# Patient Record
Sex: FEMALE | Race: WHITE | Employment: FULL TIME | URBAN - METROPOLITAN AREA
[De-identification: names, ages, dates, MRNs, and addresses within clinical notes are randomized per-mention and may not be internally consistent; named-entity substitution may affect disease eponyms.]

---

## 2019-08-19 ENCOUNTER — APPOINTMENT (OUTPATIENT)
Dept: GENERAL RADIOLOGY | Age: 24
End: 2019-08-19
Payer: MEDICAID

## 2019-08-19 ENCOUNTER — HOSPITAL ENCOUNTER (EMERGENCY)
Age: 24
Discharge: HOME | End: 2019-08-19
Payer: MEDICAID

## 2019-08-19 VITALS
HEIGHT: 67 IN | TEMPERATURE: 98.4 F | OXYGEN SATURATION: 100 % | SYSTOLIC BLOOD PRESSURE: 132 MMHG | BODY MASS INDEX: 36.1 KG/M2 | HEART RATE: 79 BPM | DIASTOLIC BLOOD PRESSURE: 100 MMHG | RESPIRATION RATE: 19 BRPM | WEIGHT: 230 LBS

## 2019-08-19 DIAGNOSIS — R07.89 CHEST WALL PAIN: Primary | ICD-10-CM

## 2019-08-19 PROCEDURE — 71045 X-RAY EXAM CHEST 1 VIEW: CPT

## 2019-08-19 PROCEDURE — 93005 ELECTROCARDIOGRAM TRACING: CPT | Performed by: PHYSICIAN ASSISTANT

## 2019-08-19 PROCEDURE — 99285 EMERGENCY DEPT VISIT HI MDM: CPT

## 2019-08-19 RX ORDER — NAPROXEN 500 MG/1
500 TABLET ORAL 2 TIMES DAILY PRN
Qty: 20 TABLET | Refills: 0 | Status: SHIPPED | OUTPATIENT
Start: 2019-08-19 | End: 2019-09-20 | Stop reason: SDUPTHER

## 2019-08-19 SDOH — HEALTH STABILITY: MENTAL HEALTH: HOW OFTEN DO YOU HAVE A DRINK CONTAINING ALCOHOL?: NEVER

## 2019-08-19 ASSESSMENT — PAIN DESCRIPTION - FREQUENCY: FREQUENCY: CONTINUOUS

## 2019-08-19 ASSESSMENT — PAIN SCALES - GENERAL: PAINLEVEL_OUTOF10: 7

## 2019-08-19 ASSESSMENT — PAIN DESCRIPTION - PAIN TYPE: TYPE: ACUTE PAIN

## 2019-08-19 ASSESSMENT — PAIN DESCRIPTION - DESCRIPTORS: DESCRIPTORS: BURNING;STABBING

## 2019-08-19 ASSESSMENT — PAIN DESCRIPTION - LOCATION: LOCATION: CHEST

## 2019-08-19 NOTE — ED NOTES
Bed: ED-35  Expected date:   Expected time:   Means of arrival:   Comments:  ems-cp     Michael Santiago RN  08/19/19 4112

## 2019-08-20 PROCEDURE — 93010 ELECTROCARDIOGRAM REPORT: CPT | Performed by: INTERNAL MEDICINE

## 2019-08-23 LAB
EKG ATRIAL RATE: 88 BPM
EKG DIAGNOSIS: NORMAL
EKG P AXIS: 42 DEGREES
EKG P-R INTERVAL: 150 MS
EKG Q-T INTERVAL: 364 MS
EKG QRS DURATION: 74 MS
EKG QTC CALCULATION (BAZETT): 440 MS
EKG R AXIS: 36 DEGREES
EKG T AXIS: 33 DEGREES
EKG VENTRICULAR RATE: 88 BPM

## 2019-09-20 ENCOUNTER — HOSPITAL ENCOUNTER (EMERGENCY)
Age: 24
Discharge: HOME | End: 2019-09-20
Attending: FAMILY MEDICINE
Payer: MEDICAID

## 2019-09-20 ENCOUNTER — APPOINTMENT (OUTPATIENT)
Dept: GENERAL RADIOLOGY | Age: 24
End: 2019-09-20
Payer: MEDICAID

## 2019-09-20 VITALS
RESPIRATION RATE: 20 BRPM | HEART RATE: 98 BPM | DIASTOLIC BLOOD PRESSURE: 88 MMHG | SYSTOLIC BLOOD PRESSURE: 144 MMHG | WEIGHT: 217 LBS | HEIGHT: 67 IN | TEMPERATURE: 98.4 F | OXYGEN SATURATION: 99 % | BODY MASS INDEX: 34.06 KG/M2

## 2019-09-20 DIAGNOSIS — N12 PYELONEPHRITIS: Primary | ICD-10-CM

## 2019-09-20 LAB
ALBUMIN SERPL-MCNC: 4.3 GM/DL (ref 3.4–5)
ALP BLD-CCNC: 82 IU/L (ref 40–129)
ALT SERPL-CCNC: 14 U/L (ref 10–40)
ANION GAP SERPL CALCULATED.3IONS-SCNC: 13 MMOL/L (ref 4–16)
AST SERPL-CCNC: 15 IU/L (ref 15–37)
BACTERIA: ABNORMAL /HPF
BASOPHILS ABSOLUTE: 0 K/CU MM
BASOPHILS RELATIVE PERCENT: 0.1 % (ref 0–1)
BILIRUB SERPL-MCNC: 0.3 MG/DL (ref 0–1)
BILIRUBIN URINE: NEGATIVE MG/DL
BLOOD, URINE: ABNORMAL
BUN BLDV-MCNC: 14 MG/DL (ref 6–23)
CALCIUM SERPL-MCNC: 9 MG/DL (ref 8.3–10.6)
CHLORIDE BLD-SCNC: 95 MMOL/L (ref 99–110)
CLARITY: ABNORMAL
CO2: 21 MMOL/L (ref 21–32)
COLOR: YELLOW
CREAT SERPL-MCNC: 0.9 MG/DL (ref 0.6–1.1)
DIFFERENTIAL TYPE: ABNORMAL
EOSINOPHILS ABSOLUTE: 0 K/CU MM
EOSINOPHILS RELATIVE PERCENT: 0.1 % (ref 0–3)
GFR AFRICAN AMERICAN: >60 ML/MIN/1.73M2
GFR NON-AFRICAN AMERICAN: >60 ML/MIN/1.73M2
GLUCOSE BLD-MCNC: 108 MG/DL (ref 70–99)
GLUCOSE, URINE: NEGATIVE MG/DL
HCG QUALITATIVE: NEGATIVE
HCT VFR BLD CALC: 35.9 % (ref 37–47)
HEMOGLOBIN: 11.1 GM/DL (ref 12.5–16)
IMMATURE NEUTROPHIL %: 0.4 % (ref 0–0.43)
KETONES, URINE: ABNORMAL MG/DL
LACTATE: 1.3 MMOL/L (ref 0.4–2)
LEUKOCYTE ESTERASE, URINE: ABNORMAL
LYMPHOCYTES ABSOLUTE: 0.9 K/CU MM
LYMPHOCYTES RELATIVE PERCENT: 6.3 % (ref 24–44)
MCH RBC QN AUTO: 27.1 PG (ref 27–31)
MCHC RBC AUTO-ENTMCNC: 30.9 % (ref 32–36)
MCV RBC AUTO: 87.6 FL (ref 78–100)
MONOCYTES ABSOLUTE: 1.1 K/CU MM
MONOCYTES RELATIVE PERCENT: 7.8 % (ref 0–4)
MUCUS: ABNORMAL HPF
NITRITE URINE, QUANTITATIVE: NEGATIVE
NUCLEATED RBC %: 0 %
PDW BLD-RTO: 14.3 % (ref 11.7–14.9)
PH, URINE: 6 (ref 5–8)
PLATELET # BLD: 376 K/CU MM (ref 140–440)
PMV BLD AUTO: 9.9 FL (ref 7.5–11.1)
POTASSIUM SERPL-SCNC: 3.7 MMOL/L (ref 3.5–5.1)
PROTEIN UA: 30 MG/DL
RAPID INFLUENZA  B AGN: NEGATIVE
RAPID INFLUENZA A AGN: NEGATIVE
RBC # BLD: 4.1 M/CU MM (ref 4.2–5.4)
RBC URINE: 7 /HPF (ref 0–6)
SEGMENTED NEUTROPHILS ABSOLUTE COUNT: 11.9 K/CU MM
SEGMENTED NEUTROPHILS RELATIVE PERCENT: 85.3 % (ref 36–66)
SODIUM BLD-SCNC: 129 MMOL/L (ref 135–145)
SPECIFIC GRAVITY UA: 1.02 (ref 1–1.03)
SQUAMOUS EPITHELIAL: 13 /HPF
TOTAL IMMATURE NEUTOROPHIL: 0.05 K/CU MM
TOTAL NUCLEATED RBC: 0 K/CU MM
TOTAL PROTEIN: 7.8 GM/DL (ref 6.4–8.2)
TRANSITIONAL EPITHELIAL: <1 /HPF
UROBILINOGEN, URINE: NORMAL MG/DL (ref 0.2–1)
WBC # BLD: 13.9 K/CU MM (ref 4–10.5)
WBC UA: 8 /HPF (ref 0–5)

## 2019-09-20 PROCEDURE — 87040 BLOOD CULTURE FOR BACTERIA: CPT

## 2019-09-20 PROCEDURE — 99284 EMERGENCY DEPT VISIT MOD MDM: CPT

## 2019-09-20 PROCEDURE — 80053 COMPREHEN METABOLIC PANEL: CPT

## 2019-09-20 PROCEDURE — 85025 COMPLETE CBC W/AUTO DIFF WBC: CPT

## 2019-09-20 PROCEDURE — 96365 THER/PROPH/DIAG IV INF INIT: CPT

## 2019-09-20 PROCEDURE — 87081 CULTURE SCREEN ONLY: CPT

## 2019-09-20 PROCEDURE — 96375 TX/PRO/DX INJ NEW DRUG ADDON: CPT

## 2019-09-20 PROCEDURE — 87430 STREP A AG IA: CPT

## 2019-09-20 PROCEDURE — 93005 ELECTROCARDIOGRAM TRACING: CPT | Performed by: FAMILY MEDICINE

## 2019-09-20 PROCEDURE — 6370000000 HC RX 637 (ALT 250 FOR IP): Performed by: FAMILY MEDICINE

## 2019-09-20 PROCEDURE — 2580000003 HC RX 258: Performed by: FAMILY MEDICINE

## 2019-09-20 PROCEDURE — 6360000002 HC RX W HCPCS: Performed by: FAMILY MEDICINE

## 2019-09-20 PROCEDURE — 87804 INFLUENZA ASSAY W/OPTIC: CPT

## 2019-09-20 PROCEDURE — 83605 ASSAY OF LACTIC ACID: CPT

## 2019-09-20 PROCEDURE — 96361 HYDRATE IV INFUSION ADD-ON: CPT

## 2019-09-20 PROCEDURE — 36415 COLL VENOUS BLD VENIPUNCTURE: CPT

## 2019-09-20 PROCEDURE — 84703 CHORIONIC GONADOTROPIN ASSAY: CPT

## 2019-09-20 PROCEDURE — 81001 URINALYSIS AUTO W/SCOPE: CPT

## 2019-09-20 PROCEDURE — 71046 X-RAY EXAM CHEST 2 VIEWS: CPT

## 2019-09-20 RX ORDER — NAPROXEN 500 MG/1
500 TABLET ORAL 2 TIMES DAILY PRN
Qty: 20 TABLET | Refills: 0 | Status: SHIPPED | OUTPATIENT
Start: 2019-09-20 | End: 2019-09-25

## 2019-09-20 RX ORDER — CEFUROXIME AXETIL 250 MG/1
250 TABLET ORAL 2 TIMES DAILY
Qty: 20 TABLET | Refills: 0 | Status: SHIPPED | OUTPATIENT
Start: 2019-09-20 | End: 2019-09-25

## 2019-09-20 RX ORDER — 0.9 % SODIUM CHLORIDE 0.9 %
30 INTRAVENOUS SOLUTION INTRAVENOUS ONCE
Status: COMPLETED | OUTPATIENT
Start: 2019-09-20 | End: 2019-09-20

## 2019-09-20 RX ORDER — KETOROLAC TROMETHAMINE 30 MG/ML
30 INJECTION, SOLUTION INTRAMUSCULAR; INTRAVENOUS ONCE
Status: COMPLETED | OUTPATIENT
Start: 2019-09-20 | End: 2019-09-20

## 2019-09-20 RX ORDER — ACETAMINOPHEN 500 MG
1000 TABLET ORAL ONCE
Status: COMPLETED | OUTPATIENT
Start: 2019-09-20 | End: 2019-09-20

## 2019-09-20 RX ADMIN — KETOROLAC TROMETHAMINE 30 MG: 30 INJECTION, SOLUTION INTRAMUSCULAR at 20:47

## 2019-09-20 RX ADMIN — ACETAMINOPHEN 1000 MG: 500 TABLET ORAL at 20:47

## 2019-09-20 RX ADMIN — CEFTRIAXONE SODIUM 2 G: 2 INJECTION, POWDER, FOR SOLUTION INTRAMUSCULAR; INTRAVENOUS at 22:35

## 2019-09-20 RX ADMIN — SODIUM CHLORIDE 2952 ML: 9 INJECTION, SOLUTION INTRAVENOUS at 20:19

## 2019-09-20 ASSESSMENT — PAIN DESCRIPTION - PAIN TYPE: TYPE: ACUTE PAIN

## 2019-09-20 ASSESSMENT — PAIN DESCRIPTION - LOCATION: LOCATION: HEAD

## 2019-09-20 ASSESSMENT — PAIN SCALES - GENERAL
PAINLEVEL_OUTOF10: 10
PAINLEVEL_OUTOF10: 10

## 2019-09-21 NOTE — ED PROVIDER NOTES
Transportation needs:     Medical: Not on file     Non-medical: Not on file   Tobacco Use    Smoking status: Never Smoker    Smokeless tobacco: Never Used   Substance and Sexual Activity    Alcohol use: Never     Frequency: Never    Drug use: Never    Sexual activity: Yes     Partners: Male   Lifestyle    Physical activity:     Days per week: Not on file     Minutes per session: Not on file    Stress: Not on file   Relationships    Social connections:     Talks on phone: Not on file     Gets together: Not on file     Attends Oriental orthodox service: Not on file     Active member of club or organization: Not on file     Attends meetings of clubs or organizations: Not on file     Relationship status: Not on file    Intimate partner violence:     Fear of current or ex partner: Not on file     Emotionally abused: Not on file     Physically abused: Not on file     Forced sexual activity: Not on file   Other Topics Concern    Not on file   Social History Narrative    Not on file     No current facility-administered medications for this encounter. Current Outpatient Medications   Medication Sig Dispense Refill    cefUROXime (CEFTIN) 250 MG tablet Take 1 tablet by mouth 2 times daily for 10 days 20 tablet 0    naproxen (NAPROSYN) 500 MG tablet Take 1 tablet by mouth 2 times daily as needed for Pain 20 tablet 0     No Known Allergies    Nursing Notes Reviewed    Physical Exam:  ED Triage Vitals   Enc Vitals Group      BP 09/20/19 1931 (!) 146/95      Pulse 09/20/19 1931 125      Resp 09/20/19 1931 20      Temp 09/20/19 1931 100.8 °F (38.2 °C)      Temp Source 09/20/19 1931 Oral      SpO2 09/20/19 1931 99 %      Weight 09/20/19 1929 217 lb (98.4 kg)      Height 09/20/19 1929 5' 7\" (1.702 m)      Head Circumference --       Peak Flow --       Pain Score --       Pain Loc --       Pain Edu? --       Excl. in [address]? --        My pulse ox interpretation is - normal    General appearance:  No acute distress.   Tachycardic Urine LARGE (A) NEGATIVE    RBC, UA 7 (H) 0 - 6 /HPF    WBC, UA 8 (H) 0 - 5 /HPF    Bacteria, UA RARE (A) NEGATIVE /HPF    Squam Epithel, UA 13 /HPF    Trans Epithel, UA <1 /HPF    Mucus, UA OCCASIONAL (A) NEGATIVE HPF   HCG Serum, Qualitative   Result Value Ref Range    hCG Qual NEGATIVE    EKG 12 Lead   Result Value Ref Range    Ventricular Rate 124 BPM    Atrial Rate 124 BPM    P-R Interval 134 ms    QRS Duration 72 ms    Q-T Interval 302 ms    QTc Calculation (Bazett) 433 ms    P Axis 41 degrees    R Axis 35 degrees    T Axis 27 degrees    Diagnosis       Sinus tachycardia  Possible Left atrial enlargement  Borderline ECG  When compared with ECG of 19-AUG-2019 18:38,  No significant change was found  Confirmed by Fred Cornejo MD, [facility] ([id number]) on 9/23/2019 4:35:21 AM        Radiographs (if obtained):  [] The following radiograph was interpreted by myself in the absence of a radiologist:   [] Radiologist's Report Reviewed:  XR CHEST STANDARD (2 VW)   Final Result   No acute process. EKG (if obtained): (All EKG's are interpreted by myself in the absence of a cardiologist) EKG demonstrates sinus tachycardia but otherwise normal.  Rate 124. Normal axis. . QRS 72. QTc 433. Normal R wave progression. No flipped T wave. No Q waves. No Brugada. No delta wave. Abnormal EKG with sinus tachycardia but no ST elevation MI or arrhythmia    Chart review shows recent radiographs:  Xr Chest Standard (2 Vw)    Result Date: 9/20/2019  EXAMINATION: TWO XRAY VIEWS OF THE CHEST 9/20/2019 7:44 pm COMPARISON: Chest x-ray August 19, 2019 HISTORY: ORDERING SYSTEM PROVIDED HISTORY: ? PNA TECHNOLOGIST PROVIDED HISTORY: Reason for exam:->? PNA Reason for Exam: ? PNA Acuity: Acute Type of Exam: Initial Additional signs and symptoms: na Relevant Medical/Surgical History: na FINDINGS: The lungs are without acute focal process. There is no effusion or pneumothorax.  The cardiomediastinal silhouette is without

## 2019-09-23 LAB
CULTURE: ABNORMAL
Lab: ABNORMAL
SPECIMEN: ABNORMAL
STREP A DIRECT SCREEN: NEGATIVE

## 2019-09-23 PROCEDURE — 93010 ELECTROCARDIOGRAM REPORT: CPT | Performed by: INTERNAL MEDICINE

## 2019-09-24 LAB
EKG ATRIAL RATE: 124 BPM
EKG DIAGNOSIS: NORMAL
EKG P AXIS: 41 DEGREES
EKG P-R INTERVAL: 134 MS
EKG Q-T INTERVAL: 302 MS
EKG QRS DURATION: 72 MS
EKG QTC CALCULATION (BAZETT): 433 MS
EKG R AXIS: 35 DEGREES
EKG T AXIS: 27 DEGREES
EKG VENTRICULAR RATE: 124 BPM

## 2019-09-25 ENCOUNTER — OFFICE VISIT (OUTPATIENT)
Dept: FAMILY MEDICINE CLINIC | Age: 24
End: 2019-09-25
Payer: MEDICAID

## 2019-09-25 VITALS
DIASTOLIC BLOOD PRESSURE: 72 MMHG | HEART RATE: 68 BPM | OXYGEN SATURATION: 98 % | BODY MASS INDEX: 38.32 KG/M2 | WEIGHT: 230 LBS | TEMPERATURE: 97 F | HEIGHT: 65 IN | SYSTOLIC BLOOD PRESSURE: 132 MMHG

## 2019-09-25 DIAGNOSIS — E87.1 HYPONATREMIA: Primary | ICD-10-CM

## 2019-09-25 DIAGNOSIS — N10 ACUTE PYELONEPHRITIS: ICD-10-CM

## 2019-09-25 DIAGNOSIS — R10.84 GENERALIZED ABDOMINAL PAIN: ICD-10-CM

## 2019-09-25 DIAGNOSIS — Z76.89 ESTABLISHING CARE WITH NEW DOCTOR, ENCOUNTER FOR: ICD-10-CM

## 2019-09-25 DIAGNOSIS — E03.9 ACQUIRED HYPOTHYROIDISM: ICD-10-CM

## 2019-09-25 LAB
CULTURE: NORMAL
CULTURE: NORMAL
Lab: NORMAL
Lab: NORMAL
SPECIMEN: NORMAL
SPECIMEN: NORMAL

## 2019-09-25 PROCEDURE — G8427 DOCREV CUR MEDS BY ELIG CLIN: HCPCS | Performed by: FAMILY MEDICINE

## 2019-09-25 PROCEDURE — G8417 CALC BMI ABV UP PARAM F/U: HCPCS | Performed by: FAMILY MEDICINE

## 2019-09-25 PROCEDURE — 1036F TOBACCO NON-USER: CPT | Performed by: FAMILY MEDICINE

## 2019-09-25 PROCEDURE — 99203 OFFICE O/P NEW LOW 30 MIN: CPT | Performed by: FAMILY MEDICINE

## 2019-09-25 RX ORDER — OMEPRAZOLE 20 MG/1
20 CAPSULE, DELAYED RELEASE ORAL
Qty: 30 CAPSULE | Refills: 1 | Status: SHIPPED | OUTPATIENT
Start: 2019-09-25 | End: 2019-11-26 | Stop reason: SDUPTHER

## 2019-09-25 RX ORDER — LEVOTHYROXINE SODIUM 0.03 MG/1
25 TABLET ORAL DAILY
Qty: 30 TABLET | Refills: 1 | Status: SHIPPED | OUTPATIENT
Start: 2019-09-25 | End: 2019-11-26 | Stop reason: SDUPTHER

## 2019-09-25 ASSESSMENT — PATIENT HEALTH QUESTIONNAIRE - PHQ9
SUM OF ALL RESPONSES TO PHQ QUESTIONS 1-9: 0
SUM OF ALL RESPONSES TO PHQ9 QUESTIONS 1 & 2: 0
2. FEELING DOWN, DEPRESSED OR HOPELESS: 0
SUM OF ALL RESPONSES TO PHQ QUESTIONS 1-9: 0
1. LITTLE INTEREST OR PLEASURE IN DOING THINGS: 0

## 2019-09-25 ASSESSMENT — ENCOUNTER SYMPTOMS
VOMITING: 0
DIARRHEA: 0
COUGH: 0
NAUSEA: 0
ABDOMINAL PAIN: 1
SHORTNESS OF BREATH: 0
BLOOD IN STOOL: 0

## 2019-10-01 ENCOUNTER — NURSE ONLY (OUTPATIENT)
Dept: FAMILY MEDICINE CLINIC | Age: 24
End: 2019-10-01
Payer: MEDICAID

## 2019-10-01 DIAGNOSIS — R10.84 GENERALIZED ABDOMINAL PAIN: ICD-10-CM

## 2019-10-01 DIAGNOSIS — E87.1 HYPONATREMIA: ICD-10-CM

## 2019-10-01 DIAGNOSIS — E03.9 ACQUIRED HYPOTHYROIDISM: ICD-10-CM

## 2019-10-01 DIAGNOSIS — N10 ACUTE PYELONEPHRITIS: ICD-10-CM

## 2019-10-01 LAB
ANION GAP SERPL CALCULATED.3IONS-SCNC: 15 MMOL/L (ref 3–16)
BASOPHILS ABSOLUTE: 0 K/UL (ref 0–0.2)
BASOPHILS RELATIVE PERCENT: 0.7 %
BUN BLDV-MCNC: 10 MG/DL (ref 7–20)
CALCIUM SERPL-MCNC: 9.6 MG/DL (ref 8.3–10.6)
CHLORIDE BLD-SCNC: 104 MMOL/L (ref 99–110)
CO2: 21 MMOL/L (ref 21–32)
CREAT SERPL-MCNC: 0.7 MG/DL (ref 0.6–1.1)
EOSINOPHILS ABSOLUTE: 0.1 K/UL (ref 0–0.6)
EOSINOPHILS RELATIVE PERCENT: 2.8 %
GFR AFRICAN AMERICAN: >60
GFR NON-AFRICAN AMERICAN: >60
GLUCOSE BLD-MCNC: 85 MG/DL (ref 70–99)
HCT VFR BLD CALC: 32.4 % (ref 36–48)
HEMOGLOBIN: 10.7 G/DL (ref 12–16)
LIPASE: 28 U/L (ref 13–60)
LYMPHOCYTES ABSOLUTE: 1.3 K/UL (ref 1–5.1)
LYMPHOCYTES RELATIVE PERCENT: 29.9 %
MCH RBC QN AUTO: 27.5 PG (ref 26–34)
MCHC RBC AUTO-ENTMCNC: 33 G/DL (ref 31–36)
MCV RBC AUTO: 83.5 FL (ref 80–100)
MONOCYTES ABSOLUTE: 0.4 K/UL (ref 0–1.3)
MONOCYTES RELATIVE PERCENT: 8.3 %
NEUTROPHILS ABSOLUTE: 2.6 K/UL (ref 1.7–7.7)
NEUTROPHILS RELATIVE PERCENT: 58.3 %
PDW BLD-RTO: 15.7 % (ref 12.4–15.4)
PLATELET # BLD: 407 K/UL (ref 135–450)
PMV BLD AUTO: 7.9 FL (ref 5–10.5)
POTASSIUM SERPL-SCNC: 4.8 MMOL/L (ref 3.5–5.1)
RBC # BLD: 3.88 M/UL (ref 4–5.2)
SODIUM BLD-SCNC: 140 MMOL/L (ref 136–145)
T4 FREE: 1.2 NG/DL (ref 0.9–1.8)
TSH SERPL DL<=0.05 MIU/L-ACNC: 3.97 UIU/ML (ref 0.27–4.2)
WBC # BLD: 4.4 K/UL (ref 4–11)

## 2019-10-01 PROCEDURE — 36415 COLL VENOUS BLD VENIPUNCTURE: CPT | Performed by: FAMILY MEDICINE

## 2019-11-12 ENCOUNTER — NURSE ONLY (OUTPATIENT)
Dept: FAMILY MEDICINE CLINIC | Age: 24
End: 2019-11-12
Payer: MEDICAID

## 2019-11-12 DIAGNOSIS — R79.89 ABNORMAL CBC: Primary | ICD-10-CM

## 2019-11-12 LAB
BASOPHILS ABSOLUTE: 0 K/UL (ref 0–0.2)
BASOPHILS RELATIVE PERCENT: 0.4 %
EOSINOPHILS ABSOLUTE: 0.1 K/UL (ref 0–0.6)
EOSINOPHILS RELATIVE PERCENT: 1.9 %
HCT VFR BLD CALC: 35.6 % (ref 36–48)
HEMOGLOBIN: 11.5 G/DL (ref 12–16)
LYMPHOCYTES ABSOLUTE: 1.2 K/UL (ref 1–5.1)
LYMPHOCYTES RELATIVE PERCENT: 20.9 %
MCH RBC QN AUTO: 27.9 PG (ref 26–34)
MCHC RBC AUTO-ENTMCNC: 32.2 G/DL (ref 31–36)
MCV RBC AUTO: 86.7 FL (ref 80–100)
MONOCYTES ABSOLUTE: 0.4 K/UL (ref 0–1.3)
MONOCYTES RELATIVE PERCENT: 6.4 %
NEUTROPHILS ABSOLUTE: 4 K/UL (ref 1.7–7.7)
NEUTROPHILS RELATIVE PERCENT: 70.4 %
PDW BLD-RTO: 16.9 % (ref 12.4–15.4)
PLATELET # BLD: 350 K/UL (ref 135–450)
PMV BLD AUTO: 8 FL (ref 5–10.5)
RBC # BLD: 4.11 M/UL (ref 4–5.2)
WBC # BLD: 5.7 K/UL (ref 4–11)

## 2019-11-12 PROCEDURE — 36000 PLACE NEEDLE IN VEIN: CPT | Performed by: FAMILY MEDICINE

## 2019-11-26 ENCOUNTER — OFFICE VISIT (OUTPATIENT)
Dept: FAMILY MEDICINE CLINIC | Age: 24
End: 2019-11-26
Payer: MEDICAID

## 2019-11-26 VITALS
SYSTOLIC BLOOD PRESSURE: 118 MMHG | WEIGHT: 229.8 LBS | TEMPERATURE: 96.5 F | DIASTOLIC BLOOD PRESSURE: 68 MMHG | BODY MASS INDEX: 38.84 KG/M2 | HEART RATE: 90 BPM | OXYGEN SATURATION: 96 %

## 2019-11-26 DIAGNOSIS — R10.84 GENERALIZED ABDOMINAL PAIN: Primary | ICD-10-CM

## 2019-11-26 DIAGNOSIS — K12.1 ORAL ULCER: ICD-10-CM

## 2019-11-26 DIAGNOSIS — E03.9 ACQUIRED HYPOTHYROIDISM: ICD-10-CM

## 2019-11-26 DIAGNOSIS — D64.9 ANEMIA, UNSPECIFIED TYPE: ICD-10-CM

## 2019-11-26 LAB
C-REACTIVE PROTEIN: 1.9 MG/L (ref 0–5.1)
SEDIMENTATION RATE, ERYTHROCYTE: 17 MM/HR (ref 0–20)

## 2019-11-26 PROCEDURE — 99214 OFFICE O/P EST MOD 30 MIN: CPT | Performed by: FAMILY MEDICINE

## 2019-11-26 PROCEDURE — 36415 COLL VENOUS BLD VENIPUNCTURE: CPT | Performed by: FAMILY MEDICINE

## 2019-11-26 PROCEDURE — 1036F TOBACCO NON-USER: CPT | Performed by: FAMILY MEDICINE

## 2019-11-26 PROCEDURE — G8417 CALC BMI ABV UP PARAM F/U: HCPCS | Performed by: FAMILY MEDICINE

## 2019-11-26 PROCEDURE — G8484 FLU IMMUNIZE NO ADMIN: HCPCS | Performed by: FAMILY MEDICINE

## 2019-11-26 PROCEDURE — G8427 DOCREV CUR MEDS BY ELIG CLIN: HCPCS | Performed by: FAMILY MEDICINE

## 2019-11-26 RX ORDER — LIDOCAINE HYDROCHLORIDE 20 MG/ML
5 SOLUTION OROPHARYNGEAL PRN
Qty: 1 BOTTLE | Refills: 0 | Status: SHIPPED | OUTPATIENT
Start: 2019-11-26 | End: 2020-03-05

## 2019-11-26 RX ORDER — OMEPRAZOLE 20 MG/1
20-40 CAPSULE, DELAYED RELEASE ORAL
Qty: 60 CAPSULE | Refills: 1 | Status: SHIPPED | OUTPATIENT
Start: 2019-11-26 | End: 2020-03-05 | Stop reason: SDUPTHER

## 2019-11-26 RX ORDER — LEVOTHYROXINE SODIUM 0.03 MG/1
25 TABLET ORAL DAILY
Qty: 90 TABLET | Refills: 3 | Status: SHIPPED | OUTPATIENT
Start: 2019-11-26 | End: 2020-07-29

## 2019-11-26 ASSESSMENT — ENCOUNTER SYMPTOMS: SHORTNESS OF BREATH: 0

## 2019-12-10 ENCOUNTER — TELEPHONE (OUTPATIENT)
Dept: FAMILY MEDICINE CLINIC | Age: 24
End: 2019-12-10

## 2019-12-10 DIAGNOSIS — D64.9 ANEMIA, UNSPECIFIED TYPE: ICD-10-CM

## 2019-12-10 LAB
CONTROL: NORMAL
HEMOCCULT STL QL: NEGATIVE

## 2020-03-04 ENCOUNTER — TELEPHONE (OUTPATIENT)
Dept: FAMILY MEDICINE CLINIC | Age: 25
End: 2020-03-04

## 2020-03-05 ENCOUNTER — OFFICE VISIT (OUTPATIENT)
Dept: FAMILY MEDICINE CLINIC | Age: 25
End: 2020-03-05
Payer: MEDICAID

## 2020-03-05 VITALS
HEART RATE: 105 BPM | SYSTOLIC BLOOD PRESSURE: 124 MMHG | DIASTOLIC BLOOD PRESSURE: 70 MMHG | WEIGHT: 227.4 LBS | OXYGEN SATURATION: 98 % | BODY MASS INDEX: 38.43 KG/M2 | TEMPERATURE: 97.7 F

## 2020-03-05 LAB
INFLUENZA VIRUS A RNA: NEGATIVE
INFLUENZA VIRUS B RNA: NEGATIVE

## 2020-03-05 PROCEDURE — 87502 INFLUENZA DNA AMP PROBE: CPT | Performed by: FAMILY MEDICINE

## 2020-03-05 PROCEDURE — 99213 OFFICE O/P EST LOW 20 MIN: CPT | Performed by: FAMILY MEDICINE

## 2020-03-05 PROCEDURE — G8484 FLU IMMUNIZE NO ADMIN: HCPCS | Performed by: FAMILY MEDICINE

## 2020-03-05 PROCEDURE — G8417 CALC BMI ABV UP PARAM F/U: HCPCS | Performed by: FAMILY MEDICINE

## 2020-03-05 PROCEDURE — G8427 DOCREV CUR MEDS BY ELIG CLIN: HCPCS | Performed by: FAMILY MEDICINE

## 2020-03-05 PROCEDURE — 1036F TOBACCO NON-USER: CPT | Performed by: FAMILY MEDICINE

## 2020-03-05 RX ORDER — BROMPHENIRAMINE MALEATE, PSEUDOEPHEDRINE HYDROCHLORIDE, AND DEXTROMETHORPHAN HYDROBROMIDE 2; 30; 10 MG/5ML; MG/5ML; MG/5ML
5 SYRUP ORAL 4 TIMES DAILY PRN
Qty: 473 ML | Refills: 0 | Status: SHIPPED | OUTPATIENT
Start: 2020-03-05 | End: 2020-07-29

## 2020-03-05 RX ORDER — OMEPRAZOLE 20 MG/1
20-40 CAPSULE, DELAYED RELEASE ORAL
Qty: 60 CAPSULE | Refills: 1 | Status: SHIPPED | OUTPATIENT
Start: 2020-03-05 | End: 2020-07-29

## 2020-03-05 ASSESSMENT — ENCOUNTER SYMPTOMS
DIARRHEA: 1
VOMITING: 1
SHORTNESS OF BREATH: 1
NAUSEA: 1
WHEEZING: 1
COUGH: 1
RHINORRHEA: 1
SORE THROAT: 1

## 2020-03-05 NOTE — PROGRESS NOTES
Subjective:      Patient ID: Hari Mulligan is a 30 y.o. female. Cinda Barrios is here with uri symptoms. Cough   This is a new problem. The current episode started in the past 7 days. The cough is productive of sputum. Associated symptoms include ear pain, headaches, postnasal drip, rhinorrhea, a sore throat, shortness of breath and wheezing. Pertinent negatives include no chills, fever or nasal congestion. Review of Systems   Constitutional: Negative for chills and fever. HENT: Positive for ear pain, postnasal drip, rhinorrhea and sore throat. Respiratory: Positive for cough, shortness of breath and wheezing. Gastrointestinal: Positive for diarrhea, nausea and vomiting. Neurological: Positive for dizziness and headaches. Past Medical History:   Diagnosis Date    Hypothyroidism 2017     No past surgical history on file.   Social History     Socioeconomic History    Marital status: Single     Spouse name: Not on file    Number of children: Not on file    Years of education: Not on file    Highest education level: Not on file   Occupational History    Not on file   Social Needs    Financial resource strain: Not on file    Food insecurity:     Worry: Not on file     Inability: Not on file    Transportation needs:     Medical: Not on file     Non-medical: Not on file   Tobacco Use    Smoking status: Never Smoker    Smokeless tobacco: Never Used   Substance and Sexual Activity    Alcohol use: Never     Frequency: Never    Drug use: Never    Sexual activity: Yes     Partners: Male   Lifestyle    Physical activity:     Days per week: Not on file     Minutes per session: Not on file    Stress: Not on file   Relationships    Social connections:     Talks on phone: Not on file     Gets together: Not on file     Attends Anabaptist service: Not on file     Active member of club or organization: Not on file     Attends meetings of clubs or organizations: Not on file     Relationship status: Not Outpatient Medications:     omeprazole (PRILOSEC) 20 MG delayed release capsule, Take 1-2 capsules by mouth every morning (before breakfast), Disp: 60 capsule, Rfl: 1    brompheniramine-pseudoephedrine-DM 2-30-10 MG/5ML syrup, Take 5 mLs by mouth 4 times daily as needed for Cough, Disp: 473 mL, Rfl: 0    levothyroxine (SYNTHROID) 25 MCG tablet, Take 1 tablet by mouth daily, Disp: 90 tablet, Rfl: 3         Srinivas Chris MD

## 2020-07-22 ENCOUNTER — TELEPHONE (OUTPATIENT)
Dept: FAMILY MEDICINE CLINIC | Age: 25
End: 2020-07-22

## 2020-07-22 NOTE — TELEPHONE ENCOUNTER
Patient left message requesting to be evaluated today. She \"did something\" to her wrist, and it now feels tingly and numb. States she is unable to feel anything in her hands.  Should patient be scheduled virtually or in office please advise

## 2020-07-29 ENCOUNTER — VIRTUAL VISIT (OUTPATIENT)
Dept: FAMILY MEDICINE CLINIC | Age: 25
End: 2020-07-29
Payer: MEDICAID

## 2020-07-29 PROCEDURE — 99447 NTRPROF PH1/NTRNET/EHR 11-20: CPT | Performed by: FAMILY MEDICINE

## 2020-07-29 RX ORDER — PREDNISONE 20 MG/1
40 TABLET ORAL DAILY
Qty: 10 TABLET | Refills: 0 | Status: SHIPPED | OUTPATIENT
Start: 2020-07-29 | End: 2020-08-03

## 2020-07-29 ASSESSMENT — PATIENT HEALTH QUESTIONNAIRE - PHQ9
1. LITTLE INTEREST OR PLEASURE IN DOING THINGS: 0
2. FEELING DOWN, DEPRESSED OR HOPELESS: 0
SUM OF ALL RESPONSES TO PHQ QUESTIONS 1-9: 0
SUM OF ALL RESPONSES TO PHQ QUESTIONS 1-9: 0
SUM OF ALL RESPONSES TO PHQ9 QUESTIONS 1 & 2: 0

## 2020-07-29 NOTE — PROGRESS NOTES
on phone: Not on file     Gets together: Not on file     Attends Taoist service: Not on file     Active member of club or organization: Not on file     Attends meetings of clubs or organizations: Not on file     Relationship status: Not on file    Intimate partner violence     Fear of current or ex partner: Not on file     Emotionally abused: Not on file     Physically abused: Not on file     Forced sexual activity: Not on file   Other Topics Concern    Not on file   Social History Narrative    Not on file     Family History   Problem Relation Age of Onset    High Blood Pressure Mother     Depression Mother     Diabetes Mother     Heart Attack Father     Other Father         DVT with PE    O:  Alert and Oriented. Normal respiratory effort. A/P:  1. Hand numbness  Try a steroid burst. If no improvement will check labs, consider neck exercise. Bein britt her thyroid med, may be making a difference. Ok to use OTC meds as needed as well. 2. Chronic Abd Pain  Etiology unclear. Consider trial of different PPI. 3. Hypothyroid  Off meds and uncontrolled. May play a roll in hand numbness. Will get labs in the near future. Follow up pending response to steroids. Current Outpatient Medications:     predniSONE (DELTASONE) 20 MG tablet, Take 2 tablets by mouth daily for 5 days, Disp: 10 tablet, Rfl: 0       I affirm this is a Patient Initiated Episode with a Patient who has not had a related appointment within my department in the past 7 days or scheduled within the next 24 hours.     Patient identification was verified at the start of the visit: Yes    Total Time: minutes: 11-20 minutes    Note: not billable if this call serves to triage the patient into an appointment for the relevant concern      Agustina Monsalve

## 2020-08-06 ENCOUNTER — TELEPHONE (OUTPATIENT)
Dept: FAMILY MEDICINE CLINIC | Age: 25
End: 2020-08-06

## 2020-08-10 NOTE — TELEPHONE ENCOUNTER
Spoke with Scheduling at [facility] in [facility] state they do accept patient insurance once she receives the referral she will try to get her in either Tuesday or Friday this week.

## 2020-08-12 NOTE — TELEPHONE ENCOUNTER
Patient notified she is requesting an appt in office to be evaluated today she has been feeling dizzy and having blurred vision since Sunday.

## 2020-08-13 ENCOUNTER — HOSPITAL ENCOUNTER (OUTPATIENT)
Age: 25
Setting detail: SPECIMEN
Discharge: HOME | End: 2020-08-13
Payer: COMMERCIAL

## 2020-08-13 ENCOUNTER — OFFICE VISIT (OUTPATIENT)
Dept: PRIMARY CARE CLINIC | Age: 25
End: 2020-08-13
Payer: MEDICAID

## 2020-08-13 VITALS — TEMPERATURE: 97.1 F

## 2020-08-13 PROCEDURE — 1036F TOBACCO NON-USER: CPT | Performed by: INTERNAL MEDICINE

## 2020-08-13 PROCEDURE — G8428 CUR MEDS NOT DOCUMENT: HCPCS | Performed by: INTERNAL MEDICINE

## 2020-08-13 PROCEDURE — U0002 COVID-19 LAB TEST NON-CDC: HCPCS

## 2020-08-13 PROCEDURE — G8417 CALC BMI ABV UP PARAM F/U: HCPCS | Performed by: INTERNAL MEDICINE

## 2020-08-13 PROCEDURE — 99213 OFFICE O/P EST LOW 20 MIN: CPT | Performed by: INTERNAL MEDICINE

## 2020-08-13 RX ORDER — AZITHROMYCIN 250 MG/1
250 TABLET, FILM COATED ORAL SEE ADMIN INSTRUCTIONS
Qty: 6 TABLET | Refills: 0 | Status: SHIPPED | OUTPATIENT
Start: 2020-08-13 | End: 2020-08-18

## 2020-08-13 RX ORDER — BENZONATATE 100 MG/1
100 CAPSULE ORAL 3 TIMES DAILY PRN
Qty: 30 CAPSULE | Refills: 0 | Status: SHIPPED | OUTPATIENT
Start: 2020-08-13 | End: 2020-08-20

## 2020-08-13 NOTE — PATIENT INSTRUCTIONS
Your COVID 19 test can take 3-5 days for the results come back. We ask that you make a Mychart page and view your test results this way. You will need to Self quarantine until you know your results. Increase fluids rest  Saline nasal spray as directed  Warm salt gargles for throat discomfort  Monitor temperature twice a day  Tylenol for fevers and/or discomfort. If symptoms are worse -Go to the ER. Follow up with your primary doctor    To Whom it May Concern:    Kathryn Read has been tested for COVID on 08/13/20. They may NOT return to work until their lab test results back and they been fever free for 3 days. If test is positive they must stay home for 2 weeks or until they test negative or as directed by the [facility] Department.

## 2020-08-13 NOTE — TELEPHONE ENCOUNTER
Last Friday she took blood last Friday it was 137/107 and no fever she checks daily temp. Last night it was 97.3 but earlier yesterday it 99.9  She is having a sore throat, stuffy nose and weakness. [facility] She is on her way now.

## 2020-08-13 NOTE — PROGRESS NOTES
8/13/20  Kathryn Cost  1995    FLU/COVID-19 CLINIC EVALUATION    HPI SYMPTOMS:    Employer: [employer], [place]    [] Fevers  [] Chills  [x] Cough  [] Coughing up blood  [] Chest Congestion  [x] Nasal Congestion  [x] Feeling short of breath  [x] Sometimes  [] Frequently  [] All the time  [x] Chest pain--AT TIMES  [x] Headaches  [x]Tolerable  [] Severe  [x] Sore throat  [x] Muscle aches  [] Nausea  [] Vomiting  []Unable to keep fluids down  [x] Diarrhea  []Severe    [x] OTHER SYMPTOMS: FATIGUE, TINGLING/NUMBNESS BILATERAL HANDS X 5 WEEKS      Symptom Duration:   [] 1  [] 2   [] 3   [] 4    [x] 5   [] 6   [] 7   [] 8   [] 9   [] 10   [] 11   [] 12   [] 13   [] 14   [] Longer than 14 days    Symptom course:   [] Worsening     [x] Stable     [] Improving    RISK FACTORS:    [] Pregnant or possibly pregnant  [] Age over 61  [] Diabetes  [] Heart disease  [] Asthma  [] COPD/Other chronic lung diseases  [] Active Cancer  [] On Chemotherapy  [] Taking oral steroids  [] History Lymphoma/Leukemia  [] Close contact with a lab confirmed COVID-19 patient within 14 days of symptom onset  [] History of travel from affected geographical areas within 14 days of symptom onset       VITALS:  There were no vitals filed for this visit. TESTS:    POCT FLU:  [] Positive     []Negative    ASSESSMENT:    [] Flu  [] Possible COVID-19  [] Strep    PLAN:    [] Discharge home with written instructions for:  [] Flu management  [] Possible COVID-19 infection with self-quarantine and management of symptoms  [] Follow-up with primary care physician or emergency department if worsens  [] Evaluation per physician/nurse practitioner in clinic  [] Sent to ER       An  electronic signature was used to authenticate this note.      --Sigrid Ford MA on 8/13/2020 at 8:53 AM

## 2020-08-13 NOTE — TELEPHONE ENCOUNTER
Noted. Was seen at [facility]. Probable bronchitis was the diagnosis.

## 2020-08-14 LAB
SARS-COV-2: DETECTED
SOURCE: ABNORMAL

## 2020-08-19 ENCOUNTER — CARE COORDINATION (OUTPATIENT)
Dept: CARE COORDINATION | Age: 25
End: 2020-08-19

## 2020-11-03 ENCOUNTER — HOSPITAL ENCOUNTER (EMERGENCY)
Age: 25
Discharge: HOME | End: 2020-11-03
Attending: EMERGENCY MEDICINE
Payer: COMMERCIAL

## 2020-11-03 VITALS
SYSTOLIC BLOOD PRESSURE: 169 MMHG | RESPIRATION RATE: 14 BRPM | OXYGEN SATURATION: 99 % | BODY MASS INDEX: 37.82 KG/M2 | HEART RATE: 101 BPM | WEIGHT: 227 LBS | DIASTOLIC BLOOD PRESSURE: 112 MMHG | HEIGHT: 65 IN | TEMPERATURE: 98.5 F

## 2020-11-03 PROCEDURE — 6370000000 HC RX 637 (ALT 250 FOR IP): Performed by: EMERGENCY MEDICINE

## 2020-11-03 PROCEDURE — 99282 EMERGENCY DEPT VISIT SF MDM: CPT

## 2020-11-03 RX ORDER — CLINDAMYCIN HYDROCHLORIDE 300 MG/1
300 CAPSULE ORAL 3 TIMES DAILY
Qty: 30 CAPSULE | Refills: 0 | Status: SHIPPED | OUTPATIENT
Start: 2020-11-03 | End: 2020-11-13

## 2020-11-03 RX ORDER — FLUCONAZOLE 150 MG/1
150 TABLET ORAL ONCE
Qty: 1 TABLET | Refills: 0 | Status: SHIPPED | OUTPATIENT
Start: 2020-11-03 | End: 2020-11-03

## 2020-11-03 RX ORDER — CLINDAMYCIN HYDROCHLORIDE 150 MG/1
300 CAPSULE ORAL ONCE
Status: COMPLETED | OUTPATIENT
Start: 2020-11-03 | End: 2020-11-03

## 2020-11-03 RX ADMIN — CLINDAMYCIN HYDROCHLORIDE 300 MG: 150 CAPSULE ORAL at 19:18

## 2020-11-03 ASSESSMENT — PAIN DESCRIPTION - ORIENTATION: ORIENTATION: RIGHT;LEFT

## 2020-11-03 ASSESSMENT — PAIN SCALES - GENERAL: PAINLEVEL_OUTOF10: 10

## 2020-11-03 ASSESSMENT — PAIN DESCRIPTION - PAIN TYPE: TYPE: ACUTE PAIN

## 2020-11-03 ASSESSMENT — PAIN DESCRIPTION - LOCATION: LOCATION: ARM

## 2020-11-03 NOTE — ED TRIAGE NOTES
Reports she had tattoos on bilat forearms 2 weeks ago. Reports redness and scabbing to areas x 2-3 days.

## 2020-11-04 NOTE — ED PROVIDER NOTES
[id number]  [address]      Pt Name: Hari Read  MRN: [id number]  [place] 1995  Date of evaluation: 11/3/2020  Provider: Nadia Fitzgerald DO    CHIEF COMPLAINT       Chief Complaint   Patient presents with    Wound Check     new tattoos on bilat arms         HISTORY OF PRESENT ILLNESS      Kathryn Read is a 30 y.o. female who presents to the emergency department  for   Chief Complaint   Patient presents with    Wound Check     new tattoos on bilat arms       The history is provided by the patient. No  was used. Rash   Location:  Shoulder/arm  Shoulder/arm rash location:  L arm and R arm  Quality: painful and redness    Pain details:     Quality:  Aching    Severity:  Mild    Onset quality:  Gradual    Duration:  2 days    Timing:  Rare    Progression:  Worsening  Severity:  Mild  Onset quality:  Gradual  Relieved by:  None tried  Worsened by:  Nothing  Ineffective treatments:  None tried  Associated symptoms: no abdominal pain, no diarrhea, no fatigue, no fever, no headaches, no joint pain, no myalgias, no nausea, no shortness of breath, no sore throat, not vomiting and not wheezing          Nursing Notes, Triage Notes & Vital Signs were reviewed. REVIEW OF SYSTEMS    (2-9 systems for level 4, 10 or more for level 5)     Review of Systems   Constitutional: Negative. Negative for chills, fatigue and fever. HENT: Negative. Negative for congestion, dental problem, facial swelling, nosebleeds, postnasal drip, rhinorrhea, sinus pressure, sinus pain and sore throat. Eyes: Negative. Negative for pain, discharge, redness and visual disturbance. Respiratory: Negative. Negative for cough, chest tightness, shortness of breath and wheezing. Cardiovascular: Negative. Negative for chest pain and palpitations. Gastrointestinal: Negative. Negative for abdominal pain, constipation, diarrhea, nausea and vomiting.    Genitourinary: Negative. Negative for dysuria, flank pain, frequency, hematuria and urgency. Musculoskeletal: Negative. Negative for arthralgias, back pain, gait problem, myalgias, neck pain and neck stiffness. Skin: Positive for color change, rash and wound. Neurological: Negative. Negative for dizziness, speech difficulty, light-headedness, numbness and headaches. Psychiatric/Behavioral: Negative. Negative for agitation, confusion, self-injury, sleep disturbance and suicidal ideas. The patient is not nervous/anxious. All other systems reviewed and are negative. Except as noted above the remainder of the review of systems was reviewed and negative. PAST MEDICAL HISTORY     Past Medical History:   Diagnosis Date    Hypothyroidism 2017       Prior to Admission medications    Medication Sig Start Date End Date Taking? Authorizing Provider   clindamycin (CLEOCIN) 300 MG capsule Take 1 capsule by mouth 3 times daily for 10 days 11/3/20 11/13/20 Yes Ann-Marie Regulus, DO   mupirocin (BACTROBAN) 2 % ointment Apply 3 times daily. 11/3/20 11/10/20 Yes Ann-Marie Regulus, DO        There is no problem list on file for this patient. SURGICAL HISTORY     History reviewed. No pertinent surgical history. CURRENT MEDICATIONS       Discharge Medication List as of 11/3/2020  7:19 PM          ALLERGIES     Patient has no known allergies.     FAMILY HISTORY       Family History   Problem Relation Age of Onset    High Blood Pressure Mother     Depression Mother     Diabetes Mother     Heart Attack Father     Other Father         DVT with PE          SOCIAL HISTORY       Social History     Socioeconomic History    Marital status: Single     Spouse name: None    Number of children: None    Years of education: None    Highest education level: None   Occupational History    None   Social Needs    Financial resource strain: None    Food insecurity     Worry: None     Inability: None    Transportation needs Medical: None     Non-medical: None   Tobacco Use    Smoking status: Never Smoker    Smokeless tobacco: Never Used   Substance and Sexual Activity    Alcohol use: Never     Frequency: Never    Drug use: Never    Sexual activity: Yes     Partners: Male   Lifestyle    Physical activity     Days per week: None     Minutes per session: None    Stress: None   Relationships    Social connections     Talks on phone: None     Gets together: None     Attends Mandaeism service: None     Active member of club or organization: None     Attends meetings of clubs or organizations: None     Relationship status: None    Intimate partner violence     Fear of current or ex partner: None     Emotionally abused: None     Physically abused: None     Forced sexual activity: None   Other Topics Concern    None   Social History Narrative    None       SCREENINGS               PHYSICAL EXAM    (up to 7 for level 4, 8 or more for level 5)     ED Triage Vitals [11/03/20 1849]   BP Temp Temp src Pulse Resp SpO2 Height Weight   (!) 169/112 98.5 °F (36.9 °C) -- 101 14 99 % 5' 4.5\" (1.638 m) 227 lb (103 kg)       Physical Exam  Vitals signs and nursing note reviewed. Constitutional:       General: She is not in acute distress. Appearance: She is well-developed. She is not diaphoretic. HENT:      Head: Normocephalic and atraumatic. Right Ear: External ear normal.      Left Ear: External ear normal.      Nose: Nose normal.      Mouth/Throat:      Pharynx: No oropharyngeal exudate. Eyes:      General: No scleral icterus. Right eye: No discharge. Left eye: No discharge. Pupils: Pupils are equal, round, and reactive to light. Neck:      Musculoskeletal: Normal range of motion. Thyroid: No thyromegaly. Vascular: No JVD. Trachea: No tracheal deviation. Cardiovascular:      Rate and Rhythm: Normal rate and regular rhythm. Heart sounds: Normal heart sounds. No murmur. No friction rub. No gallop. Pulmonary:      Effort: Pulmonary effort is normal. No respiratory distress. Breath sounds: No stridor. Abdominal:      General: Abdomen is flat. Palpations: Abdomen is soft. Tenderness: There is no abdominal tenderness. Musculoskeletal: Normal range of motion. General: No tenderness or deformity. Skin:     General: Skin is warm. Capillary Refill: Capillary refill takes less than 2 seconds. Coloration: Skin is not pale. Findings: Lesion and rash present. No erythema. Neurological:      Mental Status: She is alert and oriented to person, place, and time. Cranial Nerves: No cranial nerve deficit. Sensory: No sensory deficit. Motor: No abnormal muscle tone. Coordination: Coordination normal.      Deep Tendon Reflexes: Reflexes normal.   Psychiatric:         Mood and Affect: Mood normal.         Behavior: Behavior normal.         Thought Content: Thought content normal.         Judgment: Judgment normal.         DIAGNOSTIC RESULTS     Labs Reviewed - No data to display       EKG: All EKG's are interpreted by the Emergency Department Physician who either signs or Co-signs this chart in the absence of a cardiologist.       EKG Interpretation    Interpreted by emergency department physician    [email or web address] Chiquita Velasco:     Non-plain film images such as CT, Ultrasound and MRI are read by the radiologist. Plain radiographic images are visualized and preliminarily interpreted by the emergency physician. Interpretation per the Radiologist below, if available at the time of this note:    No orders to display         ED BEDSIDE ULTRASOUND:   Performed by ED Physician Sebastian Boateng DO       LABS:  Labs Reviewed - No data to display    All other labs were within normal range or not returned as of this dictation.     EMERGENCY DEPARTMENT COURSE and DIFFERENTIAL DIAGNOSIS/MDM:   Vitals:    Vitals:    11/03/20 1849   BP: (!) 169/112   Pulse: 101   Resp: 14   Temp: 98.5 °F (36.9 °C)   SpO2: 99%   Weight: 227 lb (103 kg)   Height: 5' 4.5\" (1.638 m)           MDM  Number of Diagnoses or Management Options  Cellulitis of upper extremity, unspecified laterality:   Diagnosis management comments: 40-year-old female presents emergency department with chief complaint of redness and wounds to the bilateral forearms after tattoo placement. On examination, patient has mild blistering and erythema. Diagnosis of cellulitis. Patient was placed on antibiotics, oral and topical.  Discharged home with return precautions and primary care follow-up in the next 48 hours for wound recheck    Risk of Complications, Morbidity, and/or Mortality  Presenting problems: low  Diagnostic procedures: low  Management options: low    Critical Care  Total time providing critical care: < 30 minutes    Patient Progress  Patient progress: improved        REASSESSMENT          CRITICAL CARE TIME     This excludes seperately billable procedures and family discussion time. Critical care time provided for obtaining history, conducting a physical exam, performing and monitoring interventions, ordering, collecting and interpreting tests, and establishing medical decision-making. There was a potential for life/limb threatening pathology requiring close evaluation and intervention with concern for patient decompensation. CONSULTS:  None    PROCEDURES:  None performed unless otherwise noted below     Procedures        FINAL IMPRESSION      1.  Cellulitis of upper extremity, unspecified laterality          DISPOSITION/PLAN   DISPOSITION Decision To Discharge 11/03/2020 07:11:57 PM      PATIENT REFERRED TO:  Everardo Gunter MD  [address] [place]  [phone]    In 3 days  For wound re-check      DISCHARGE MEDICATIONS:  Discharge Medication List as of 11/3/2020  7:19 PM      START taking these medications    Details   clindamycin (CLEOCIN) 300 MG capsule Take 1 capsule by mouth 3 times daily for 10 days, Disp-30 capsule,R-0Print      mupirocin (BACTROBAN) 2 % ointment Apply 3 times daily. , Disp-1 Tube,R-0, Print      fluconazole (DIFLUCAN) 150 MG tablet Take 1 tablet by mouth once for 1 dose, Disp-1 tablet,R-0Print             ED Provider Disposition Time  DISPOSITION Decision To Discharge 11/03/2020 07:11:57 PM      Appropriate personal protective equipment was worn during the patient's evaluation. These included surgical, eye protection, surgical mask or in 95 respirator and gloves. The patient was also placed in a surgical mask for the prevention of possible spread of respiratory viral illnesses. The Patient was instructed to read the package inserts with any medication that was prescribed. Major potential reactions and medication interactions were discussed. The Patient understands that there are numerous possible adverse reactions not covered. The patient was also instructed to arrange follow-up with his or her primary care provider for review of any pending labwork or incidental findings on any radiology results that were obtained. All efforts were made to discuss any incidental findings that require further monitoring. Controlled Substances Monitoring:     No flowsheet data found.     (Please note that portions of this note were completed with a voice recognition program.  Efforts were made to edit the dictations but occasionally words are mis-transcribed.)    Alyson Sawyer DO (electronically signed)  Attending Emergency Physician            Alyson Sawyer DO  11/05/20 9499

## 2020-11-05 ASSESSMENT — ENCOUNTER SYMPTOMS
GASTROINTESTINAL NEGATIVE: 1
COLOR CHANGE: 1
SINUS PRESSURE: 0
COUGH: 0
SINUS PAIN: 0
VOMITING: 0
EYE PAIN: 0
CHEST TIGHTNESS: 0
EYE DISCHARGE: 0
ABDOMINAL PAIN: 0
DIARRHEA: 0
WHEEZING: 0
RHINORRHEA: 0
BACK PAIN: 0
EYE REDNESS: 0
NAUSEA: 0
FACIAL SWELLING: 0
EYES NEGATIVE: 1
SHORTNESS OF BREATH: 0
SORE THROAT: 0
RESPIRATORY NEGATIVE: 1
CONSTIPATION: 0

## 2021-09-29 ENCOUNTER — OFFICE VISIT (OUTPATIENT)
Dept: FAMILY MEDICINE CLINIC | Age: 26
End: 2021-09-29
Payer: COMMERCIAL

## 2021-09-29 VITALS
SYSTOLIC BLOOD PRESSURE: 112 MMHG | BODY MASS INDEX: 44.39 KG/M2 | OXYGEN SATURATION: 99 % | WEIGHT: 260 LBS | TEMPERATURE: 98.4 F | HEART RATE: 87 BPM | DIASTOLIC BLOOD PRESSURE: 82 MMHG | HEIGHT: 64 IN

## 2021-09-29 DIAGNOSIS — Z11.4 SCREENING FOR HIV (HUMAN IMMUNODEFICIENCY VIRUS): ICD-10-CM

## 2021-09-29 DIAGNOSIS — E03.9 HYPOTHYROIDISM, UNSPECIFIED TYPE: ICD-10-CM

## 2021-09-29 DIAGNOSIS — Z00.00 ENCOUNTER FOR WELL WOMAN EXAM WITHOUT GYNECOLOGICAL EXAM: ICD-10-CM

## 2021-09-29 DIAGNOSIS — R53.83 FATIGUE, UNSPECIFIED TYPE: ICD-10-CM

## 2021-09-29 DIAGNOSIS — R11.0 NAUSEA: Primary | ICD-10-CM

## 2021-09-29 DIAGNOSIS — E66.01 MORBID OBESITY (HCC): ICD-10-CM

## 2021-09-29 DIAGNOSIS — Z11.59 NEED FOR HEPATITIS C SCREENING TEST: ICD-10-CM

## 2021-09-29 DIAGNOSIS — Z86.2 HISTORY OF IRON DEFICIENCY ANEMIA: ICD-10-CM

## 2021-09-29 PROBLEM — D50.9 IRON DEFICIENCY ANEMIA: Status: ACTIVE | Noted: 2020-11-09

## 2021-09-29 PROBLEM — E66.09 OBESITY DUE TO EXCESS CALORIES WITHOUT SERIOUS COMORBIDITY: Status: ACTIVE | Noted: 2021-09-29

## 2021-09-29 LAB
BASOPHILS ABSOLUTE: 0 K/UL (ref 0–0.2)
BASOPHILS RELATIVE PERCENT: 0.5 %
CONTROL: NORMAL
EOSINOPHILS ABSOLUTE: 0.1 K/UL (ref 0–0.6)
EOSINOPHILS RELATIVE PERCENT: 1.1 %
HCG QUALITATIVE: NEGATIVE
HCT VFR BLD CALC: 35.9 % (ref 36–48)
HEMOGLOBIN: 12.2 G/DL (ref 12–16)
LYMPHOCYTES ABSOLUTE: 1.7 K/UL (ref 1–5.1)
LYMPHOCYTES RELATIVE PERCENT: 26 %
MCH RBC QN AUTO: 30.6 PG (ref 26–34)
MCHC RBC AUTO-ENTMCNC: 33.9 G/DL (ref 31–36)
MCV RBC AUTO: 90.3 FL (ref 80–100)
MONOCYTES ABSOLUTE: 0.5 K/UL (ref 0–1.3)
MONOCYTES RELATIVE PERCENT: 7.3 %
NEUTROPHILS ABSOLUTE: 4.3 K/UL (ref 1.7–7.7)
NEUTROPHILS RELATIVE PERCENT: 65.1 %
PDW BLD-RTO: 13.8 % (ref 12.4–15.4)
PLATELET # BLD: 383 K/UL (ref 135–450)
PMV BLD AUTO: 7.8 FL (ref 5–10.5)
PREGNANCY TEST URINE, POC: NEGATIVE
RBC # BLD: 3.97 M/UL (ref 4–5.2)
WBC # BLD: 6.6 K/UL (ref 4–11)

## 2021-09-29 PROCEDURE — 36415 COLL VENOUS BLD VENIPUNCTURE: CPT | Performed by: FAMILY MEDICINE

## 2021-09-29 PROCEDURE — 99214 OFFICE O/P EST MOD 30 MIN: CPT | Performed by: FAMILY MEDICINE

## 2021-09-29 PROCEDURE — 81025 URINE PREGNANCY TEST: CPT | Performed by: FAMILY MEDICINE

## 2021-09-29 ASSESSMENT — ENCOUNTER SYMPTOMS
DIARRHEA: 0
ABDOMINAL PAIN: 1
COUGH: 0
NAUSEA: 1
APNEA: 0
ANAL BLEEDING: 0
SHORTNESS OF BREATH: 0
CONSTIPATION: 0
VOMITING: 0
ABDOMINAL DISTENTION: 1
CHOKING: 0
BLOOD IN STOOL: 0

## 2021-09-29 NOTE — PROGRESS NOTES
9/29/21    Kathryn Cost  1995    Crispin Jamse is a 27 y.o. female who presents today for evaluation of:  Chief Complaint   Patient presents with    Establish Care    Amenorrhea    Heartburn     Possible pregnancy : LMP was 7/21/21. Her abdomen feels full. She reports that her last menstrual period was about 2 months ago and she has been having a lot of nausea. Whenever she eats something she feels uncomfortable when she lays in certain positions she gets nausea and an upset stomach so she moves positions. She has not been vomiting. In the past she has been pregnant for several months and pregnancy tests were negative. Her lower abdomen appears as though she is pregnant. She is desiring to become pregnant. Thyroid : She was taking a medicine for hypothyroidism but when her previous doctor left she did not get refills so she has been without her thyroid medicine for some time now. Constant nausea : She has had stomach nausea for which she is taken medications in the past.  I got the impression that this was a different more longstanding condition than the upset stomach that she has been feeling related to the possible pregnancy. Review of Systems   Constitutional: Positive for fatigue. Negative for fever. Respiratory: Negative for apnea (but she will ask a housemate to check and report to her. ), cough, choking and shortness of breath. Cardiovascular: Negative for chest pain and leg swelling. Gastrointestinal: Positive for abdominal distention, abdominal pain and nausea. Negative for anal bleeding, blood in stool, constipation, diarrhea and vomiting. Genitourinary: Negative for dysuria and hematuria. Psychiatric/Behavioral: Positive for sleep disturbance. Negative for dysphoric mood, self-injury and suicidal ideas.      Fasting: Yes    No Known Allergies     OBJECTIVE    /82 (Site: Left Upper Arm, Position: Sitting, Cuff Size: Large Adult)   Pulse 87   Temp 98.4 °F (36.9 °C) (Infrared)   Ht 5' 4\" (1.626 m)   Wt 260 lb (117.9 kg)   SpO2 99%   BMI 44.63 kg/m²     Physical Exam   Constitutional:       General: Not in acute distress. Appearance: Normal appearance. Not ill-appearing, toxic-appearing or diaphoretic. Very obese  HENT:      Head: Normocephalic. Right Ear: Tympanic membrane, ear canal and external ear normal.      Left Ear: Tympanic membrane, ear canal and external ear normal.      Nose: No rhinorrhea. Mouth/Throat:      Mouth: Mucous membranes are moist.      Pharynx: Oropharynx is clear. No oropharyngeal exudate or posterior oropharyngeal erythema. Neck:      Thyroid: No thyroid mass, thyromegaly or thyroid tenderness. Lymphadenopathy:      Cervical:      Right cervical: No superficial cervical adenopathy. Left cervical: No superficial cervical adenopathy. Eyes:      General: No scleral icterus. Right eye: No discharge. Left eye: No discharge. Conjunctiva/sclera: Conjunctivae normal.   Neck:      Thyroid: No thyroid mass, thyromegaly or thyroid tenderness. Cardiovascular:      Rate and Rhythm: Normal rate and regular rhythm. Pulses:           Posterior tibial pulses are 2+ on the right side and 2+ on the left side. Heart sounds: Normal heart sounds. No murmur heard. No friction rub. No gallop. Pulmonary:      Effort: Pulmonary effort is normal. No respiratory distress. Breath sounds: Normal breath sounds. No stridor. No wheezing, rhonchi or rales. Abdominal:      General: There is no distension. Palpations: Abdomen is soft. Tenderness: There is no abdominal tenderness. There is no guarding. I did not palpate any lower abdominal masses. Musculoskeletal:         General: No deformity. Cervical back: No rigidity. Right lower leg: No edema. Left lower leg: No edema. Lymphadenopathy:      Cervical: No cervical adenopathy.       Right upper body: No supraclavicular or axillary adenopathy. Left upper body: No supraclavicular or axillary adenopathy. Lower Body: No right inguinal adenopathy. No left inguinal adenopathy. Skin:     General: Skin is warm. Coloration: Skin is not jaundiced. Neurological:      Mental Status: She is alert. Deep Tendon Reflexes:      Reflex Scores:       Patellar reflexes are 3+ on the right side and 3+ on the left side. Psychiatric:         Attention and Perception: Attention and perception normal.         Mood and Affect: Mood normal.         Speech: Speech normal.         Behavior: Behavior normal.         Thought Content: Thought content normal.    Her point-of-care urine pregnancy test in the office was negative. ASSESSMENT/PLAN:    1. Nausea  -     POCT urine pregnancy  -     HCG, SERUM, QUALITATIVE  Her office pregnancy test was negative. I ordered a serum pregnancy test since she has had problems with falls negative urine pregnancy tests in the past.  2. Morbid obesity ([address]  Assessment & Plan:   I encouraged weight loss. She states that she only eats salads. I stressed the importance of avoiding liquid sugars. Orders:  -     TSH without Reflex  -     Hemoglobin A1C  -     Lipid Panel  3. Other iron deficiency anemia  Assessment & Plan:   She has a history of iron deficiency anemia. She does report a lot of fatigue. I will check a CBC. 4. Hypothyroidism, unspecified type  Assessment & Plan:   I will check a TSH and T4. It is possible that her fatigue is due to not taking a necessary thyroid medicine. Orders:  -     TSH without Reflex  -     T4, Free  5. Fatigue, unspecified type  -     CBC Auto Differential  -     Comprehensive Metabolic Panel  I asked her to ask a person who observes her sleeping to report to her if she has apneic pauses when sleeping. If she does I asked her to contact me. 6. Need for hepatitis C screening test  -     Hepatitis C Antibody  7.  Screening for HIV (human immunodeficiency virus)  -     HIV Screen  8. Encounter for well woman exam without gynecological exam  -     Faby Chakraborty MD, OB-GYN, [facility]        Counseling provided for:  >> Healthy eating - 1> avoid carbohydrates; 2> avoid saturated fats (these are hard at room temperature like grease, butter, margerine, shortening, beef fat, pork fat, cream, etc.); 3> eat more foods with protein (especially plant protein like in peanuts, beans, quinoa, etc.); 4> eat more healthy unsaturated fats (runny at room temperature like oils and nut oils and fish oils and avocados). >> Exercise - 1> try to do 150 minutes a week of exercise that is as hard as walking briskly (30 minutes 5 days a week or 22 minutes every day); and 2> do some strength training 2 or 3 times a week (It's great for depression and overall confidence when you are improving and lifting 2.5 pounds more now than a few days ago. Make sure you use good technique for lifting.). Weight loss ideas: 1> Pick one or two unhealthy foods and don't allow them in your home; 2> experiment with intermittent fasting or at least don't consume any calories at all when it is not a regular mealtime of breakfast, lunch, or suppertime; 3> eat only unprocessed foods (or foods that have a single ingredient when you buy them); 4> eliminate all sugar sweetened drinks (pop, juice, sweet tea, etc.); 5> if you consume alcoholic beverages you can reduce calories by reducing how much you drink. Return in about 6 weeks (around 11/10/2021) for Recheck thyroid.      Amy Quesada MD

## 2021-09-29 NOTE — ASSESSMENT & PLAN NOTE
I encouraged weight loss. She states that she only eats salads. I stressed the importance of avoiding liquid sugars.

## 2021-09-29 NOTE — PATIENT INSTRUCTIONS
Patient Education        Fatigue: Care Instructions  Your Care Instructions     Fatigue is a feeling of tiredness, exhaustion, or lack of energy. You may feel fatigue because of too much or not enough activity. It can also come from stress, lack of sleep, boredom, and poor diet. Many medical problems, such as viral infections, can cause fatigue. Emotional problems, especially depression, are often the cause of fatigue. Fatigue is most often a symptom of another problem. Treatment for fatigue depends on the cause. For example, if you have fatigue because you have a certain health problem, treating this problem also treats your fatigue. If depression or anxiety is the cause, treatment may help. Follow-up care is a key part of your treatment and safety. Be sure to make and go to all appointments, and call your doctor if you are having problems. It's also a good idea to know your test results and keep a list of the medicines you take. How can you care for yourself at home? · Get regular exercise. But don't overdo it. Go back and forth between rest and exercise. · Get plenty of rest.  · Eat a healthy diet. Do not skip meals, especially breakfast.  · Reduce your use of caffeine, tobacco, and alcohol. Caffeine is most often found in coffee, tea, cola drinks, and chocolate. · Limit medicines that can cause fatigue. This includes tranquilizers and cold and allergy medicines. When should you call for help? Watch closely for changes in your health, and be sure to contact your doctor if:    · You have new symptoms such as fever or a rash.     · Your fatigue gets worse.     · You have been feeling down, depressed, or hopeless. Or you may have lost interest in things that you usually enjoy.     · You are not getting better as expected. Where can you learn more? Go to https://roopa.myTips. org and sign in to your [employer] account.  Enter F818 in the MailLift box to learn more about \"Fatigue: Care Instructions. \"     If you do not have an account, please click on the \"Sign Up Now\" link. Current as of: July 1, 2021               Content Version: 13.0  © 2006-2021 [employer]. Care instructions adapted under license by [facility] ([facility]). If you have questions about a medical condition or this instruction, always ask your healthcare professional. [address] any warranty or liability for your use of this information. Patient Education        Hypothyroidism: Care Instructions  Your Care Instructions     When you have hypothyroidism, your body doesn't make enough thyroid hormone. This hormone helps your body use energy. If your thyroid level is low, you may feel tired, be constipated, have an increase in your blood pressure, or have dry skin or memory problems. You may also get cold easily, even when it is warm. Women with low thyroid levels may have heavy menstrual periods. A blood test to find your thyroid-stimulating hormone (TSH) level is used to check for hypothyroidism. A high TSH level may mean that you have it. The treatment for hypothyroidism is thyroid hormone pills. You should start to feel better in 1 to 2 weeks. Most people need treatment for the rest of their lives. You will need regular visits with your doctor to make sure you are doing well and that you have the right dose of medicine. Follow-up care is a key part of your treatment and safety. Be sure to make and go to all appointments, and call your doctor if you are having problems. It's also a good idea to know your test results and keep a list of the medicines you take. How can you care for yourself at home? · Take your thyroid hormone medicine exactly as prescribed. Call your doctor if you think you are having a problem with your medicine. Most people do not have side effects if they take the right amount of medicine regularly.   ? Take the medicine 30 minutes before breakfast, and do not take it with calcium, vitamins, or iron. ? Do not take extra doses of your thyroid medicine. It will not help you get better any faster, and it may cause side effects. ? If you forget to take a dose, do NOT take a double dose of medicine. Take your usual dose the next day. · Tell your doctor about all prescription, herbal, or over-the-counter products you take. · Take care of yourself. Eat a healthy diet, get enough sleep, and get regular exercise. When should you call for help? Call 911 anytime you think you may need emergency care. For example, call if:    · You passed out (lost consciousness).     · You have severe trouble breathing.     · You have a very slow heartbeat (less than 60 beats a minute).     · You have a low body temperature (95°F or below). Call your doctor now or seek immediate medical care if:    · You feel tired, sluggish, or weak.     · You have trouble remembering things or concentrating.     · You do not begin to feel better 2 weeks after starting your medicine. Watch closely for changes in your health, and be sure to contact your doctor if you have any problems. Where can you learn more? Go to https://OVGuide.disco volante. org and sign in to your [employer] account. Enter I371 in the KoolSpan box to learn more about \"Hypothyroidism: Care Instructions. \"     If you do not have an account, please click on the \"Sign Up Now\" link. Current as of: December 2, 2020               Content Version: 13.0  © 2421-7087 Healthwise, Incorporated. Care instructions adapted under license by [facility] ([facility]). If you have questions about a medical condition or this instruction, always ask your healthcare professional. [place] [address] any warranty or liability for your use of this information.

## 2021-09-29 NOTE — ASSESSMENT & PLAN NOTE
I will check a TSH and T4. It is possible that her fatigue is due to not taking a necessary thyroid medicine.

## 2021-09-30 PROBLEM — Z86.2 HISTORY OF IRON DEFICIENCY ANEMIA: Status: ACTIVE | Noted: 2020-11-09

## 2021-09-30 LAB
A/G RATIO: 1.6 (ref 1.1–2.2)
ALBUMIN SERPL-MCNC: 4.7 G/DL (ref 3.4–5)
ALP BLD-CCNC: 80 U/L (ref 40–129)
ALT SERPL-CCNC: 10 U/L (ref 10–40)
ANION GAP SERPL CALCULATED.3IONS-SCNC: 18 MMOL/L (ref 3–16)
AST SERPL-CCNC: 16 U/L (ref 15–37)
BILIRUB SERPL-MCNC: 0.4 MG/DL (ref 0–1)
BUN BLDV-MCNC: 10 MG/DL (ref 7–20)
CALCIUM SERPL-MCNC: 9.7 MG/DL (ref 8.3–10.6)
CHLORIDE BLD-SCNC: 103 MMOL/L (ref 99–110)
CHOLESTEROL, TOTAL: 196 MG/DL (ref 0–199)
CO2: 19 MMOL/L (ref 21–32)
CREAT SERPL-MCNC: 0.8 MG/DL (ref 0.6–1.1)
ESTIMATED AVERAGE GLUCOSE: 114 MG/DL
GFR AFRICAN AMERICAN: >60
GFR NON-AFRICAN AMERICAN: >60
GLOBULIN: 2.9 G/DL
GLUCOSE BLD-MCNC: 80 MG/DL (ref 70–99)
HBA1C MFR BLD: 5.6 %
HDLC SERPL-MCNC: 55 MG/DL (ref 40–60)
HEPATITIS C ANTIBODY INTERPRETATION: NORMAL
HIV AG/AB: NORMAL
HIV ANTIGEN: NORMAL
HIV-1 ANTIBODY: NORMAL
HIV-2 AB: NORMAL
LDL CHOLESTEROL CALCULATED: 130 MG/DL
POTASSIUM SERPL-SCNC: 4.2 MMOL/L (ref 3.5–5.1)
SODIUM BLD-SCNC: 140 MMOL/L (ref 136–145)
T4 FREE: 0.5 NG/DL (ref 0.9–1.8)
TOTAL PROTEIN: 7.6 G/DL (ref 6.4–8.2)
TRIGL SERPL-MCNC: 56 MG/DL (ref 0–150)
TSH SERPL DL<=0.05 MIU/L-ACNC: 131.5 UIU/ML (ref 0.27–4.2)
VLDLC SERPL CALC-MCNC: 11 MG/DL

## 2021-09-30 RX ORDER — LEVOTHYROXINE SODIUM 0.05 MG/1
50 TABLET ORAL DAILY
Qty: 90 TABLET | Refills: 0 | Status: SHIPPED | OUTPATIENT
Start: 2021-09-30 | End: 2021-11-12 | Stop reason: SDUPTHER

## 2021-11-11 ENCOUNTER — OFFICE VISIT (OUTPATIENT)
Dept: FAMILY MEDICINE CLINIC | Age: 26
End: 2021-11-11
Payer: COMMERCIAL

## 2021-11-11 VITALS
DIASTOLIC BLOOD PRESSURE: 84 MMHG | OXYGEN SATURATION: 99 % | BODY MASS INDEX: 46 KG/M2 | TEMPERATURE: 97.4 F | HEART RATE: 111 BPM | SYSTOLIC BLOOD PRESSURE: 136 MMHG | WEIGHT: 268 LBS

## 2021-11-11 DIAGNOSIS — S93.401D SPRAIN OF RIGHT ANKLE, UNSPECIFIED LIGAMENT, SUBSEQUENT ENCOUNTER: ICD-10-CM

## 2021-11-11 DIAGNOSIS — E03.9 HYPOTHYROIDISM, UNSPECIFIED TYPE: ICD-10-CM

## 2021-11-11 DIAGNOSIS — R06.83 SNORING: ICD-10-CM

## 2021-11-11 DIAGNOSIS — E66.01 MORBID OBESITY (HCC): ICD-10-CM

## 2021-11-11 DIAGNOSIS — R55 SYNCOPE, UNSPECIFIED SYNCOPE TYPE: Primary | ICD-10-CM

## 2021-11-11 PROBLEM — S93.409A SPRAIN OF ANKLE: Status: ACTIVE | Noted: 2021-11-11

## 2021-11-11 PROCEDURE — 99214 OFFICE O/P EST MOD 30 MIN: CPT | Performed by: FAMILY MEDICINE

## 2021-11-11 PROCEDURE — 93000 ELECTROCARDIOGRAM COMPLETE: CPT | Performed by: FAMILY MEDICINE

## 2021-11-11 PROCEDURE — 36415 COLL VENOUS BLD VENIPUNCTURE: CPT | Performed by: FAMILY MEDICINE

## 2021-11-11 RX ORDER — IBUPROFEN 800 MG/1
800 TABLET ORAL 3 TIMES DAILY PRN
Qty: 60 TABLET | Refills: 0 | Status: SHIPPED | OUTPATIENT
Start: 2021-11-11 | End: 2022-03-16 | Stop reason: SDUPTHER

## 2021-11-11 RX ORDER — IBUPROFEN 800 MG/1
800 TABLET ORAL 3 TIMES DAILY PRN
COMMUNITY
Start: 2021-10-31 | End: 2021-11-11 | Stop reason: SDUPTHER

## 2021-11-11 SDOH — ECONOMIC STABILITY: FOOD INSECURITY: WITHIN THE PAST 12 MONTHS, YOU WORRIED THAT YOUR FOOD WOULD RUN OUT BEFORE YOU GOT MONEY TO BUY MORE.: NEVER TRUE

## 2021-11-11 SDOH — ECONOMIC STABILITY: FOOD INSECURITY: WITHIN THE PAST 12 MONTHS, THE FOOD YOU BOUGHT JUST DIDN'T LAST AND YOU DIDN'T HAVE MONEY TO GET MORE.: NEVER TRUE

## 2021-11-11 ASSESSMENT — ENCOUNTER SYMPTOMS
SHORTNESS OF BREATH: 0
CHOKING: 0
BACK PAIN: 0
NAUSEA: 0
COUGH: 0
CONSTIPATION: 0
TROUBLE SWALLOWING: 0
CHEST TIGHTNESS: 0
APNEA: 1
EYE PAIN: 0
ABDOMINAL PAIN: 0
DIARRHEA: 0
VOMITING: 0

## 2021-11-11 ASSESSMENT — PATIENT HEALTH QUESTIONNAIRE - PHQ9
2. FEELING DOWN, DEPRESSED OR HOPELESS: 0
1. LITTLE INTEREST OR PLEASURE IN DOING THINGS: 0
SUM OF ALL RESPONSES TO PHQ QUESTIONS 1-9: 0
SUM OF ALL RESPONSES TO PHQ QUESTIONS 1-9: 0
SUM OF ALL RESPONSES TO PHQ9 QUESTIONS 1 & 2: 0
SUM OF ALL RESPONSES TO PHQ QUESTIONS 1-9: 0

## 2021-11-11 ASSESSMENT — SOCIAL DETERMINANTS OF HEALTH (SDOH): HOW HARD IS IT FOR YOU TO PAY FOR THE VERY BASICS LIKE FOOD, HOUSING, MEDICAL CARE, AND HEATING?: NOT HARD AT ALL

## 2021-11-11 NOTE — ASSESSMENT & PLAN NOTE
After seeing the tilt table report from November 2020 I suspect vasovagal syncope. However she deserves further evaluation by a different cardiologist since she has had recurrent symptoms and being out from 5 AM to noon is not typical for vasovagal syncope.

## 2021-11-11 NOTE — ASSESSMENT & PLAN NOTE
I encouraged trying to find or invent exercises that she can do despite having her right ankle in a lower leg orthopedic boot.

## 2021-11-11 NOTE — PATIENT INSTRUCTIONS
Patient Education        Lightheadedness or Faintness: Care Instructions  Your Care Instructions  Lightheadedness is a feeling that you are about to faint or \"pass out. \" You do not feel as if you or your surroundings are moving. It is different from vertigo, which is the feeling that you or things around you are spinning or tilting. Lightheadedness usually goes away or gets better when you lie down. If lightheadedness gets worse, it can lead to a fainting spell. It is common to feel lightheaded from time to time. Lightheadedness usually is not caused by a serious problem. It often is caused by a short-lasting drop in blood pressure and blood flow to your head that occurs when you get up too quickly from a seated or lying position. Follow-up care is a key part of your treatment and safety. Be sure to make and go to all appointments, and call your doctor if you are having problems. It's also a good idea to know your test results and keep a list of the medicines you take. How can you care for yourself at home? · Lie down for 1 or 2 minutes when you feel lightheaded. After lying down, sit up slowly and remain sitting for 1 to 2 minutes before slowly standing up. · Avoid movements, positions, or activities that have made you lightheaded in the past.  · Get plenty of rest, especially if you have a cold or flu, which can cause lightheadedness. · Make sure you drink plenty of fluids, especially if you have a fever or have been sweating. · Do not drive or put yourself and others in danger while you feel lightheaded. When should you call for help? Call 911 anytime you think you may need emergency care. For example, call if:    · You have symptoms of a stroke. These may include:  ? Sudden numbness, tingling, weakness, or loss of movement in your face, arm, or leg, especially on only one side of your body. ? Sudden vision changes. ? Sudden trouble speaking.   ? Sudden confusion or trouble understanding simple statements. ? Sudden problems with walking or balance. ? A sudden, severe headache that is different from past headaches.     · You have symptoms of a heart attack. These may include:  ? Chest pain or pressure, or a strange feeling in the chest.  ? Sweating. ? Shortness of breath. ? Nausea or vomiting. ? Pain, pressure, or a strange feeling in the back, neck, jaw, or upper belly or in one or both shoulders or arms. ? Lightheadedness or sudden weakness. ? A fast or irregular heartbeat. After you call 911, the  may tell you to chew 1 adult-strength or 2 to 4 low-dose aspirin. Wait for an ambulance. Do not try to drive yourself. Watch closely for changes in your health, and be sure to contact your doctor if:    · Your lightheadedness gets worse or does not get better with home care. Where can you learn more? Go to https://[employer].ZenDay. org and sign in to your [facility] account. Enter G330 in the [employer] box to learn more about \"Lightheadedness or Faintness: Care Instructions. \"     If you do not have an account, please click on the \"Sign Up Now\" link. Current as of: July 1, 2021               Content Version: 13.0  © 9468-0779 Healthwise, Incorporated. Care instructions adapted under license by [facility] ([facility]). If you have questions about a medical condition or this instruction, always ask your healthcare professional. [place] [address] any warranty or liability for your use of this information.

## 2021-11-11 NOTE — PROGRESS NOTES
11/11/21    Kathryn Cost  1995    SUBJECTIVE    HPI - Deo Gan is a 27 y.o. female who presents today for evaluation of:  Chief Complaint   Patient presents with    Thyroid Problem    Snoring     She passed out a few times in October 2020. A cardiologist told her it was \"cardiovascular depression\" which her friends and other cardiologists have never heard of. Boyfriend reports that she stops snoring for long amounts of time and it is unclear if she stops breathing vs. Just stops snoring. Passed out from 4am to [place] once. She was going to restroom. It occurred right after she went to the bathroom. She gets a lot of light headedness and dizziness. This occurred October 27, 2021. She does not think that she hit her head. She did not have any headaches. Review of Systems   Constitutional: Negative for fatigue and fever. HENT: Negative for nosebleeds and trouble swallowing. Eyes: Negative for pain and visual disturbance. Respiratory: Positive for apnea (uncertain - see HPI. ). Negative for cough, choking, chest tightness and shortness of breath. Cardiovascular: Negative for chest pain, palpitations and leg swelling. Gastrointestinal: Negative for abdominal pain, constipation, diarrhea, nausea and vomiting. Endocrine: Positive for cold intolerance. Negative for heat intolerance, polydipsia, polyphagia and polyuria. Genitourinary: Negative for difficulty urinating, hematuria, vaginal bleeding, vaginal discharge and vaginal pain. Musculoskeletal: Negative for arthralgias, back pain and gait problem. Skin: Negative for rash and wound. Allergic/Immunologic: Negative for environmental allergies, food allergies and immunocompromised state. Neurological: Positive for dizziness, syncope, light-headedness and headaches. Negative for tremors, seizures, speech difficulty, weakness and numbness. Hematological: Negative for adenopathy. Bruises/bleeds easily.    Psychiatric/Behavioral: Negative for decreased concentration, dysphoric mood and suicidal ideas. The patient is not nervous/anxious. No Known Allergies     OBJECTIVE    /84 (Site: Left Upper Arm, Position: Sitting, Cuff Size: Large Adult)   Pulse 111   Temp 97.4 °F (36.3 °C) (Infrared)   Wt 268 lb (121.6 kg)   SpO2 99%   BMI 46.00 kg/m²     Physical Exam   Constitutional:       General: Not in acute distress. Appearance: Normal appearance. Not ill-appearing, toxic-appearing or diaphoretic. Very obese with rt lower leg boot-orthopedic. HENT:      Head: Normocephalic. Right Ear: Tympanic membrane, ear canal and external ear normal.      Left Ear: Tympanic membrane, ear canal and external ear normal.      Nose: No rhinorrhea. Mouth/Throat:      Mouth: Mucous membranes are moist.      Pharynx: Oropharynx is clear. No oropharyngeal exudate or posterior oropharyngeal erythema. Eyes:      General: No scleral icterus. Right eye: No discharge. Left eye: No discharge. Conjunctiva/sclera: Conjunctivae normal.   Neck:      Thyroid: No thyroid mass, thyromegaly or thyroid tenderness. Cardiovascular:      Rate and Rhythm: Normal rate and regular rhythm. Pulses:           Posterior tibial pulses are 2+ on the right side and 2+ on the left side. Heart sounds: Normal heart sounds. No murmur heard. No friction rub. No gallop. Pulmonary:      Effort: Pulmonary effort is normal. No respiratory distress. Breath sounds: Normal breath sounds. No stridor. No wheezing, rhonchi or rales. Abdominal:      General: There is no distension. Palpations: Abdomen is soft. Tenderness: There is no abdominal tenderness. There is no guarding. Musculoskeletal:         General: No deformity. Lymphadenopathy:      Cervical: No cervical adenopathy. Right upper body: No supraclavicular or axillary adenopathy. Left upper body: No supraclavicular or axillary adenopathy. Skin:     General: Skin is warm. Coloration: Skin is not jaundiced. Neurological:      Mental Status: She is alert. Deep Tendon Reflexes:      Reflex Scores:       Patellar reflexes are 2+ on the right side and 2+ on the left side. Psychiatric:         Attention and Perception: Attention and perception normal.         Mood and Affect: Mood normal.         Speech: Speech normal.         Behavior: Behavior normal.         Thought Content: Thought content normal.    Reviewed Medical studies: 11/10/20 tilt table and 10/15/20 CT of head. Tilt table suggests vasovagal syncope. Head CT NL.     EKG interpretation: Sinus rhythm. Rate 85. No Q waves except in lead aVR. No ST elevation or depression. QRS axis 35 degrees. DE interval 158, QRST 84, QTc interval 417. No LVH. Good R wave progression in the precordial leads. No ectopy. Compared with the numerical and words interpretation of an EKG done 10/16/2020 for which I was not able to see the tracing and there is essentially no change. ASSESSMENT/PLAN:    1. Syncope, unspecified syncope type  Assessment & Plan:   After seeing the tilt table report from November 2020 I suspect vasovagal syncope. However she deserves further evaluation by a different cardiologist since she has had recurrent symptoms and being out from 5 AM to noon is not typical for vasovagal syncope. Orders:  -     EKG 12 lead; Future  -     [facility]  2. Hypothyroidism, unspecified type  Assessment & Plan:   I will check a TSH and T4 today and if needed adjust the levothyroxine dose. Orders:  -     T4, Free  -     TSH without Reflex  3. Morbid obesity ([address]  Assessment & Plan:  I encouraged trying to find or invent exercises that she can do despite having her right ankle in a lower leg orthopedic boot.   4. Sprain of right ankle, unspecified ligament, subsequent encounter  Assessment & Plan:   I provided a refill of ibuprofen to help with the pain related to her ankle sprain. Orders:  -     ibuprofen (ADVIL;MOTRIN) 800 MG tablet; Take 1 tablet by mouth 3 times daily as needed for Pain, Disp-60 tablet, R-0Normal  5. Snoring  Assessment & Plan:   I asked her to ask her boyfriend to pay attention when she stops none to determine if she is breathing and to report the results. Counseling provided for:  >> Healthy eating - 1> avoid sugar and other refined carbohydrates; 2> eat more healthy unsaturated fats (runny at room temperature like oils and nut oils and fish oils and avocados); 3> eat more foods with fiber. >> Exercise - 1> try to do 150 minutes a week of exercise that is as hard as walking briskly (30 minutes 5 days a week or 22 minutes every day); and 2> do some strength training 2 or 3 times a week. Return in about 6 weeks (around 12/23/2021) for thyroid.      Sia Hoang MD

## 2021-11-11 NOTE — ASSESSMENT & PLAN NOTE
I asked her to ask her boyfriend to pay attention when she stops none to determine if she is breathing and to report the results.

## 2021-11-12 LAB
T4 FREE: 1.1 NG/DL (ref 0.9–1.8)
TSH SERPL DL<=0.05 MIU/L-ACNC: 21.84 UIU/ML (ref 0.27–4.2)

## 2021-11-12 RX ORDER — LEVOTHYROXINE SODIUM 0.07 MG/1
75 TABLET ORAL DAILY
Qty: 90 TABLET | Refills: 0 | Status: SHIPPED | OUTPATIENT
Start: 2021-11-12 | End: 2021-12-29 | Stop reason: SDUPTHER

## 2021-11-29 ENCOUNTER — INITIAL CONSULT (OUTPATIENT)
Dept: CARDIOLOGY CLINIC | Age: 26
End: 2021-11-29
Payer: COMMERCIAL

## 2021-11-29 VITALS
DIASTOLIC BLOOD PRESSURE: 82 MMHG | HEART RATE: 88 BPM | HEIGHT: 67 IN | BODY MASS INDEX: 41.91 KG/M2 | WEIGHT: 267 LBS | SYSTOLIC BLOOD PRESSURE: 124 MMHG

## 2021-11-29 DIAGNOSIS — R55 SYNCOPE, UNSPECIFIED SYNCOPE TYPE: Primary | ICD-10-CM

## 2021-11-29 PROCEDURE — G8427 DOCREV CUR MEDS BY ELIG CLIN: HCPCS | Performed by: INTERNAL MEDICINE

## 2021-11-29 PROCEDURE — 99204 OFFICE O/P NEW MOD 45 MIN: CPT | Performed by: INTERNAL MEDICINE

## 2021-11-29 PROCEDURE — 1036F TOBACCO NON-USER: CPT | Performed by: INTERNAL MEDICINE

## 2021-11-29 PROCEDURE — G8484 FLU IMMUNIZE NO ADMIN: HCPCS | Performed by: INTERNAL MEDICINE

## 2021-11-29 PROCEDURE — G8417 CALC BMI ABV UP PARAM F/U: HCPCS | Performed by: INTERNAL MEDICINE

## 2021-11-29 NOTE — LETTER
[address] [place] [address] [address] [facility] [address]  Phone: [phone]  Fax: [phone]    Maria Luz Mak MD        November 29, 2021     Patient: Kaila Read   YOB: 1995   Date of Visit: 11/29/2021       To Whom it May Concern:    Kathryn Read was seen in my clinic on 11/29/2021. She may return to work on 11/29/2021. If you have any questions or concerns, please don't hesitate to call.     Sincerely,         Maria Luz Mak MD

## 2021-11-29 NOTE — PROGRESS NOTES
CARDIOLOGY NOTE      11/29/2021    RE: Red Read  (1995)                               TO:  Dr. Delvin Polk MD      New pt        Buffy Chahal is a 27 y.o. female who was seen today for management of  syncope                                    HPI:                   Pt has h/o obesity, anemia, seen today for  syncope. Pt has  Been having dizzy spells, has mild mid sternal CP as well , has passed out a few times as well, happening for a year    Kathryn Read has the following history recorded in care path:  Patient Active Problem List    Diagnosis Date Noted    Sprain of ankle 11/11/2021    Syncope 11/11/2021    Snoring 11/11/2021    Morbid obesity ([address] 09/29/2021    Hypothyroidism, unspecified 09/29/2021    History of iron deficiency anemia 11/09/2020     Current Outpatient Medications   Medication Sig Dispense Refill    levothyroxine (SYNTHROID) 75 MCG tablet Take 1 tablet by mouth daily 90 tablet 0    ibuprofen (ADVIL;MOTRIN) 800 MG tablet Take 1 tablet by mouth 3 times daily as needed for Pain 60 tablet 0    Prenatal Vit-DSS-Fe Cbn-FA (PRENATAL AD PO) Take 1 tablet by mouth daily (Patient not taking: Reported on 11/29/2021)       No current facility-administered medications for this visit. Allergies: Patient has no known allergies. Past Medical History:   Diagnosis Date    Hypothyroidism 2017     History reviewed. No pertinent surgical history.    As reviewed   Family History   Problem Relation Age of Onset    High Blood Pressure Mother     Depression Mother     Diabetes Mother     Heart Attack Father     Other Father         DVT with PE     Social History     Tobacco Use    Smoking status: Never Smoker    Smokeless tobacco: Never Used   Substance Use Topics    Alcohol use: Never     Comment: caffeine none      Review of Systems:    Constitutional: Negative for diaphoresis and fatigue  Psychological:Negative for anxiety or depression  HENT: Negative for headaches, nasal congestion, sinus pain or vertigo  Eyes: Negative for visual disturbance. Endocrine: Negative for polydipsia/polyuria  Respiratory: Negative for shortness of breath  Cardiovascular: Negative for chest pain, dyspnea on exertion, claudication, edema, irregular heartbeat, murmur, palpitations or shortness of breath  Gastrointestinal: Negative for abdominal pain or heartburn  Genito-Urinary: Negative for urinary frequency/urgency  Musculoskeletal: Negative for muscle pain, muscular weakness, negative for pain in arm and leg or swelling in foot and leg  Neurological: dizzy  Dermatological: Negative for rash    Objective:    Vitals:    11/29/21 0858   BP: 114/76   Pulse: 88   Weight: 267 lb (121.1 kg)   Height: 5' 7\" (1.702 m)     /76   Pulse 88   Ht 5' 7\" (1.702 m)   Wt 267 lb (121.1 kg)   BMI 41.82 kg/m²     No flowsheet data found. Wt Readings from Last 3 Encounters:   11/29/21 267 lb (121.1 kg)   11/11/21 268 lb (121.6 kg)   09/29/21 260 lb (117.9 kg)     Body mass index is 41.82 kg/m². GENERAL - Alert, oriented, pleasant, in no apparent distress. EYES: No jaundice, no conjunctival pallor. SKIN: It is warm & dry. No rashes. No Echhymosis    HEENT - No clinically significant abnormalities seen. Neck - Supple. No jugular venous distention noted. No carotid bruits. Cardiovascular - Normal S1 and S2 without obvious murmur or gallop. Extremities - No cyanosis, clubbing, or significant edema. Pulmonary - No respiratory distress. No wheezes or rales. Abdomen - No masses, tenderness, or organomegaly. Musculoskeletal - No significant edema. No joint deformities. No muscle wasting. Neurologic - Cranial nerves II through XII are grossly intact. There were no gross focal neurologic abnormalities.     Lab Review   No results found for: CKTOTAL, CKMB, CKMBINDEX, TROPONINT  BNP:  No results found for: BNP  PT/INR:  No results found for: INR  Lab Results   Component Value Date LABA1C 5.6 09/29/2021     Lab Results   Component Value Date    WBC 6.6 09/29/2021    HCT 35.9 (L) 09/29/2021    MCV 90.3 09/29/2021     09/29/2021     Lab Results   Component Value Date    CHOL 196 09/29/2021    TRIG 56 09/29/2021    HDL 55 09/29/2021    LDLCALC 130 (H) 09/29/2021     Lab Results   Component Value Date    ALT 10 09/29/2021    AST 16 09/29/2021     BMP:    Lab Results   Component Value Date     09/29/2021    K 4.2 09/29/2021     09/29/2021    CO2 19 09/29/2021    BUN 10 09/29/2021    CREATININE 0.8 09/29/2021     CMP:   Lab Results   Component Value Date     09/29/2021    K 4.2 09/29/2021     09/29/2021    CO2 19 09/29/2021    BUN 10 09/29/2021    PROT 7.6 09/29/2021     TSH:    Lab Results   Component Value Date    TSH 21.84 11/11/2021           Assessment & Plan:      - Syncope: ? Orthostatic  ? arrythmias    Echo, ETT , holter  YELENA if above neg        Mortality from the morbid obesity is very high: Body mass index is 41.82 kg/m².             Caroline Cuevas MD    [facility]

## 2021-12-03 ENCOUNTER — PROCEDURE VISIT (OUTPATIENT)
Dept: CARDIOLOGY CLINIC | Age: 26
End: 2021-12-03
Payer: MEDICAID

## 2021-12-03 ENCOUNTER — NURSE ONLY (OUTPATIENT)
Dept: CARDIOLOGY CLINIC | Age: 26
End: 2021-12-03
Payer: MEDICAID

## 2021-12-03 DIAGNOSIS — I49.9 IRREGULAR HEART RATE: Primary | ICD-10-CM

## 2021-12-03 DIAGNOSIS — R55 SYNCOPE, UNSPECIFIED SYNCOPE TYPE: ICD-10-CM

## 2021-12-03 LAB
LV EF: 58 %
LVEF MODALITY: NORMAL

## 2021-12-03 PROCEDURE — 93306 TTE W/DOPPLER COMPLETE: CPT | Performed by: INTERNAL MEDICINE

## 2021-12-03 PROCEDURE — 93225 XTRNL ECG REC<48 HRS REC: CPT | Performed by: INTERNAL MEDICINE

## 2021-12-03 PROCEDURE — 93015 CV STRESS TEST SUPVJ I&R: CPT | Performed by: INTERNAL MEDICINE

## 2021-12-06 ENCOUNTER — TELEPHONE (OUTPATIENT)
Dept: CARDIOLOGY CLINIC | Age: 26
End: 2021-12-06

## 2021-12-06 NOTE — TELEPHONE ENCOUNTER
Results given to the patient. Left ventricular function is normal, EF 55-60%. No significant valvular disease noted. Normal pulmonary artery pressure, RVSP 33 mmHg.    No evidence of pericardial effusion

## 2021-12-20 PROCEDURE — 93227 XTRNL ECG REC<48 HR R&I: CPT | Performed by: INTERNAL MEDICINE

## 2021-12-28 ENCOUNTER — OFFICE VISIT (OUTPATIENT)
Dept: FAMILY MEDICINE CLINIC | Age: 26
End: 2021-12-28
Payer: MEDICAID

## 2021-12-28 VITALS
SYSTOLIC BLOOD PRESSURE: 122 MMHG | TEMPERATURE: 97.2 F | DIASTOLIC BLOOD PRESSURE: 80 MMHG | WEIGHT: 268 LBS | OXYGEN SATURATION: 98 % | HEART RATE: 90 BPM | BODY MASS INDEX: 41.97 KG/M2

## 2021-12-28 DIAGNOSIS — E66.01 MORBID OBESITY (HCC): ICD-10-CM

## 2021-12-28 DIAGNOSIS — E03.9 HYPOTHYROIDISM, UNSPECIFIED TYPE: Primary | ICD-10-CM

## 2021-12-28 DIAGNOSIS — Z23 IMMUNIZATION DUE: ICD-10-CM

## 2021-12-28 LAB
T4 FREE: 0.9 NG/DL (ref 0.9–1.8)
TSH SERPL DL<=0.05 MIU/L-ACNC: 9.77 UIU/ML (ref 0.27–4.2)

## 2021-12-28 PROCEDURE — 90674 CCIIV4 VAC NO PRSV 0.5 ML IM: CPT | Performed by: FAMILY MEDICINE

## 2021-12-28 PROCEDURE — 90471 IMMUNIZATION ADMIN: CPT | Performed by: FAMILY MEDICINE

## 2021-12-28 PROCEDURE — G8427 DOCREV CUR MEDS BY ELIG CLIN: HCPCS | Performed by: FAMILY MEDICINE

## 2021-12-28 PROCEDURE — G8482 FLU IMMUNIZE ORDER/ADMIN: HCPCS | Performed by: FAMILY MEDICINE

## 2021-12-28 PROCEDURE — 1036F TOBACCO NON-USER: CPT | Performed by: FAMILY MEDICINE

## 2021-12-28 PROCEDURE — G8417 CALC BMI ABV UP PARAM F/U: HCPCS | Performed by: FAMILY MEDICINE

## 2021-12-28 PROCEDURE — 36415 COLL VENOUS BLD VENIPUNCTURE: CPT | Performed by: FAMILY MEDICINE

## 2021-12-28 PROCEDURE — 99213 OFFICE O/P EST LOW 20 MIN: CPT | Performed by: FAMILY MEDICINE

## 2021-12-28 NOTE — PROGRESS NOTES
12/28/21    Kathryn Cost  1995    Mahad Jolly is a 27 y.o. female who presents today for evaluation of:  Chief Complaint   Patient presents with    Other     6 week f/u     Thyroid : about 6 wks ago LT4 was increased 50 to 75. She has trouble sleeping but otherwise feels OK. She is active. Morb Obesity : has not been able to lose wt due to holidays. Otherwise she thinks she would. Review of Systems   Constitutional: Negative for chills, fatigue and fever. Endocrine: Negative for cold intolerance, heat intolerance, polydipsia and polyuria. No Known Allergies     OBJECTIVE    /80   Pulse 90   Temp 97.2 °F (36.2 °C) (Infrared)   Wt 268 lb (121.6 kg)   SpO2 98%   BMI 41.97 kg/m²     Physical Exam   Constitutional:       General: Not in acute distress. Appearance: Normal appearance. Not ill-appearing. Eyes:      General: No scleral icterus. Neck:      Thyroid: No thyroid mass, thyromegaly or thyroid tenderness. Lymphadenopathy:      Cervical:      Right cervical: No superficial cervical adenopathy. Left cervical: No superficial cervical adenopathy. Cardiovascular:      Rate and Rhythm: Normal rate and regular rhythm. Heart sounds: No murmur heard. No friction rub. No gallop. Pulmonary:      Effort: Pulmonary effort is normal. No respiratory distress. Breath sounds: No wheezing, rhonchi or rales. Musculoskeletal:     Moves all extremities normally. Skin:     General: Skin is warm. Coloration: Skin is not jaundiced. Neurological:      Mental Status: She is alert. Psychiatric:         Behavior: Behavior normal.         Thought Content: Thought content normal.         Judgment: Judgment normal.      ASSESSMENT/PLAN:    1. Hypothyroidism, unspecified type  Assessment & Plan:   I will recheck her TSH and T4 on 75 mcg daily and plan to make adjustments as needed when we get the results back.   Orders:  -     T4, Free  -     TSH without Reflex  2. Morbid obesity ([facility] [address]  Assessment & Plan:   Encouraged weight loss after getting past the holidays. 3. Immunization due  -     INFLUENZA, MDCK QUADV, 2 YRS AND OLDER, IM, PF, PREFILL SYR OR SDV, 0.5ML (FLUCELVAX QUADV, PF)        Counseling provided for:    >> Exercise - 1> try to do 150 minutes a week of exercise that is as hard as walking briskly (30 minutes 5 days a week or 22 minutes every day); and 2> do some strength training 2 or 3 times a week. Weight loss ideas: 1> Pick one or two unhealthy foods and don't allow them in your home; 2> experiment with intermittent fasting or at least don't consume any calories at all when it is not a regular mealtime of breakfast, lunch, or suppertime; 3> eat only unprocessed foods (or foods that have a single ingredient when you buy them); 4> eliminate all sugar sweetened drinks (pop, juice, sweet tea, etc.); 5> if you consume alcoholic beverages you can reduce calories by reducing how much you drink. No follow-ups on file.      Jazmyne Woodson MD

## 2021-12-28 NOTE — ASSESSMENT & PLAN NOTE
I will recheck her TSH and T4 on 75 mcg daily and plan to make adjustments as needed when we get the results back.

## 2021-12-29 RX ORDER — LEVOTHYROXINE SODIUM 88 UG/1
88 TABLET ORAL DAILY
Qty: 90 TABLET | Refills: 0 | Status: SHIPPED | OUTPATIENT
Start: 2021-12-29 | End: 2022-03-17 | Stop reason: SDUPTHER

## 2022-02-10 ENCOUNTER — OFFICE VISIT (OUTPATIENT)
Dept: CARDIOLOGY CLINIC | Age: 27
End: 2022-02-10
Payer: MEDICAID

## 2022-02-10 VITALS
DIASTOLIC BLOOD PRESSURE: 78 MMHG | HEIGHT: 67 IN | WEIGHT: 278 LBS | SYSTOLIC BLOOD PRESSURE: 130 MMHG | HEART RATE: 76 BPM | BODY MASS INDEX: 43.63 KG/M2

## 2022-02-10 DIAGNOSIS — R55 SYNCOPE, UNSPECIFIED SYNCOPE TYPE: Primary | ICD-10-CM

## 2022-02-10 PROCEDURE — G8428 CUR MEDS NOT DOCUMENT: HCPCS | Performed by: INTERNAL MEDICINE

## 2022-02-10 PROCEDURE — G8417 CALC BMI ABV UP PARAM F/U: HCPCS | Performed by: INTERNAL MEDICINE

## 2022-02-10 PROCEDURE — 1036F TOBACCO NON-USER: CPT | Performed by: INTERNAL MEDICINE

## 2022-02-10 PROCEDURE — 99213 OFFICE O/P EST LOW 20 MIN: CPT | Performed by: INTERNAL MEDICINE

## 2022-02-10 PROCEDURE — G8482 FLU IMMUNIZE ORDER/ADMIN: HCPCS | Performed by: INTERNAL MEDICINE

## 2022-02-10 NOTE — PROGRESS NOTES
CARDIOLOGY NOTE      2/10/2022    RE: Clive Cushing Cost  (1995)                               TO:  Dr. Bolivar Cobb MD            Roberta Nava is a 27 y.o. female who was seen today for management of  syncope                                    HPI:                   Pt has h/o morbid obesity, seen today for for follow-up on her testing. Pt has had 1 more episode of passing out she was wearing the monitor at that time however the Holter monitor did not show any arrhythmias she was sitting when that happened there was no premonitory symptoms denies any chest pain shortness of breath    Kathryn Read has the following history recorded in care path:  Patient Active Problem List    Diagnosis Date Noted    Sprain of ankle 11/11/2021    Syncope 11/11/2021    Snoring 11/11/2021    Morbid obesity ([address] 09/29/2021    Hypothyroidism, unspecified 09/29/2021    History of iron deficiency anemia 11/09/2020     Current Outpatient Medications   Medication Sig Dispense Refill    levothyroxine (SYNTHROID) 88 MCG tablet Take 1 tablet by mouth daily 90 tablet 0    ibuprofen (ADVIL;MOTRIN) 800 MG tablet Take 1 tablet by mouth 3 times daily as needed for Pain 60 tablet 0    Prenatal Vit-DSS-Fe Cbn-FA (PRENATAL AD PO) Take 1 tablet by mouth daily (Patient not taking: Reported on 11/29/2021)       No current facility-administered medications for this visit. Allergies: Patient has no known allergies. Past Medical History:   Diagnosis Date    Hypothyroidism 2017     History reviewed. No pertinent surgical history.    As reviewed   Family History   Problem Relation Age of Onset    High Blood Pressure Mother     Depression Mother     Diabetes Mother     Heart Attack Father     Other Father         DVT with PE     Social History     Tobacco Use    Smoking status: Never Smoker    Smokeless tobacco: Never Used   Substance Use Topics    Alcohol use: Never     Comment: caffeine none Objective:    Vitals:    02/10/22 1609   BP: 130/78   Pulse: 76   Weight: 278 lb (126.1 kg)   Height: 5' 7\" (1.702 m)     /78   Pulse 76   Ht 5' 7\" (1.702 m)   Wt 278 lb (126.1 kg)   BMI 43.54 kg/m²     No flowsheet data found. Wt Readings from Last 3 Encounters:   02/10/22 278 lb (126.1 kg)   12/28/21 268 lb (121.6 kg)   11/29/21 267 lb (121.1 kg)     Body mass index is 43.54 kg/m². GENERAL - Alert, oriented, pleasant, in no apparent distress. EYES: No jaundice, no conjunctival pallor. SKIN: It is warm & dry. No rashes. No Echhymosis    HEENT - No clinically significant abnormalities seen. Neck - Supple. No jugular venous distention noted. No carotid bruits. Cardiovascular - Normal S1 and S2 without obvious murmur or gallop. Extremities - No cyanosis, clubbing, or significant edema. Pulmonary - No respiratory distress. No wheezes or rales. Abdomen - No masses, tenderness, or organomegaly. Musculoskeletal - No significant edema. No joint deformities. No muscle wasting. Neurologic - Cranial nerves II through XII are grossly intact. There were no gross focal neurologic abnormalities.     Lab Review   No results found for: CKTOTAL, CKMB, CKMBINDEX, TROPONINT  BNP:  No results found for: BNP  PT/INR:  No results found for: INR  Lab Results   Component Value Date    LABA1C 5.6 09/29/2021     Lab Results   Component Value Date    WBC 6.6 09/29/2021    HCT 35.9 (L) 09/29/2021    MCV 90.3 09/29/2021     09/29/2021     Lab Results   Component Value Date    CHOL 196 09/29/2021    TRIG 56 09/29/2021    HDL 55 09/29/2021    LDLCALC 130 (H) 09/29/2021     Lab Results   Component Value Date    ALT 10 09/29/2021    AST 16 09/29/2021     BMP:    Lab Results   Component Value Date     09/29/2021    K 4.2 09/29/2021     09/29/2021    CO2 19 09/29/2021    BUN 10 09/29/2021    CREATININE 0.8 09/29/2021     CMP:   Lab Results   Component Value Date     09/29/2021    K 4.2 09/29/2021     09/29/2021    CO2 19 09/29/2021    BUN 10 09/29/2021    PROT 7.6 09/29/2021     TSH:    Lab Results   Component Value Date    TSH 9.77 12/28/2021           Assessment & Plan:    - syncope: Cardiac work-up so far has been unremarkable includes an echocardiogram Holter monitor and a regular treadmill stress test  Patient is not orthostatic as well  Patient will probably need a neuro consult for evaluation for episodes that might include an EEG I discussed with her to see her PCP for a referral      -Hypothyroidism patient is on on Synthroid 88 mcg p.o. daily  TSH is still high at 9.77 is being followed up by PCP  Her thyroid dose might need to be adjusted for this    Mortality from the morbid obesity is very high:  Patient is morbidly obese weight loss was discussed with her with diet and exercise     Body mass index is 43.54 kg/m².           Karen Baker MD    [facility]

## 2022-02-10 NOTE — LETTER
[address] [address]  Phone: [phone]  Fax: [phone]    Kristofer Mon MD        February 10, 2022     Patient: Shelli Read   YOB: 1995   Date of Visit: 2/10/2022       To Whom It May Concern:   Kathryn Read was seen in our office today. .    If you have any questions or concerns, please don't hesitate to call.     Sincerely,        Kristofer Mon MD

## 2022-02-14 ENCOUNTER — OFFICE VISIT (OUTPATIENT)
Dept: FAMILY MEDICINE CLINIC | Age: 27
End: 2022-02-14
Payer: MEDICAID

## 2022-02-14 VITALS
DIASTOLIC BLOOD PRESSURE: 70 MMHG | OXYGEN SATURATION: 99 % | SYSTOLIC BLOOD PRESSURE: 110 MMHG | TEMPERATURE: 97.5 F | BODY MASS INDEX: 43.32 KG/M2 | HEIGHT: 67 IN | RESPIRATION RATE: 16 BRPM | WEIGHT: 276 LBS | HEART RATE: 96 BPM

## 2022-02-14 DIAGNOSIS — R55 SYNCOPE, UNSPECIFIED SYNCOPE TYPE: ICD-10-CM

## 2022-02-14 DIAGNOSIS — R30.0 DYSURIA: ICD-10-CM

## 2022-02-14 DIAGNOSIS — H92.03 OTALGIA OF BOTH EARS: ICD-10-CM

## 2022-02-14 DIAGNOSIS — N30.01 ACUTE CYSTITIS WITH HEMATURIA: Primary | ICD-10-CM

## 2022-02-14 LAB
BILIRUBIN, POC: ABNORMAL
BLOOD URINE, POC: ABNORMAL
CLARITY, POC: ABNORMAL
COLOR, POC: ABNORMAL
GLUCOSE URINE, POC: NEGATIVE
KETONES, POC: ABNORMAL
LEUKOCYTE EST, POC: ABNORMAL
NITRITE, POC: NEGATIVE
PH, POC: 5.5
PROTEIN, POC: ABNORMAL
SPECIFIC GRAVITY, POC: >=1.03
UROBILINOGEN, POC: ABNORMAL

## 2022-02-14 PROCEDURE — 81002 URINALYSIS NONAUTO W/O SCOPE: CPT | Performed by: FAMILY MEDICINE

## 2022-02-14 PROCEDURE — G8482 FLU IMMUNIZE ORDER/ADMIN: HCPCS | Performed by: FAMILY MEDICINE

## 2022-02-14 PROCEDURE — 1036F TOBACCO NON-USER: CPT | Performed by: FAMILY MEDICINE

## 2022-02-14 PROCEDURE — G8417 CALC BMI ABV UP PARAM F/U: HCPCS | Performed by: FAMILY MEDICINE

## 2022-02-14 PROCEDURE — G8427 DOCREV CUR MEDS BY ELIG CLIN: HCPCS | Performed by: FAMILY MEDICINE

## 2022-02-14 PROCEDURE — 99213 OFFICE O/P EST LOW 20 MIN: CPT | Performed by: FAMILY MEDICINE

## 2022-02-14 RX ORDER — SULFAMETHOXAZOLE AND TRIMETHOPRIM 800; 160 MG/1; MG/1
1 TABLET ORAL 2 TIMES DAILY
Qty: 6 TABLET | Refills: 0 | Status: SHIPPED | OUTPATIENT
Start: 2022-02-14 | End: 2022-02-17

## 2022-02-14 ASSESSMENT — ENCOUNTER SYMPTOMS
VOMITING: 0
COUGH: 0
SHORTNESS OF BREATH: 0
DIARRHEA: 0

## 2022-02-14 NOTE — PATIENT INSTRUCTIONS
Patient Education        Lightheadedness or Faintness: Care Instructions  Your Care Instructions  Lightheadedness is a feeling that you are about to faint or \"pass out. \" You do not feel as if you or your surroundings are moving. It is different from vertigo, which is the feeling that you or things around you are spinning or tilting. Lightheadedness usually goes away or gets better when you lie down. If lightheadedness gets worse, it can lead to a fainting spell. It is common to feel lightheaded from time to time. Lightheadedness usually is not caused by a serious problem. It often is caused by a short-lasting drop in blood pressure and blood flow to your head that occurs when you get up too quickly from a seated or lying position. Follow-up care is a key part of your treatment and safety. Be sure to make and go to all appointments, and call your doctor if you are having problems. It's also a good idea to know your test results and keep a list of the medicines you take. How can you care for yourself at home? · Lie down for 1 or 2 minutes when you feel lightheaded. After lying down, sit up slowly and remain sitting for 1 to 2 minutes before slowly standing up. · Avoid movements, positions, or activities that have made you lightheaded in the past.  · Get plenty of rest, especially if you have a cold or flu, which can cause lightheadedness. · Make sure you drink plenty of fluids, especially if you have a fever or have been sweating. · Do not drive or put yourself and others in danger while you feel lightheaded. When should you call for help? Call 911 anytime you think you may need emergency care. For example, call if:    · You have symptoms of a stroke. These may include:  ? Sudden numbness, tingling, weakness, or loss of movement in your face, arm, or leg, especially on only one side of your body. ? Sudden vision changes. ? Sudden trouble speaking.   ? Sudden confusion or trouble understanding simple statements. ? Sudden problems with walking or balance. ? A sudden, severe headache that is different from past headaches.     · You have symptoms of a heart attack. These may include:  ? Chest pain or pressure, or a strange feeling in the chest.  ? Sweating. ? Shortness of breath. ? Nausea or vomiting. ? Pain, pressure, or a strange feeling in the back, neck, jaw, or upper belly or in one or both shoulders or arms. ? Lightheadedness or sudden weakness. ? A fast or irregular heartbeat. After you call 911, the  may tell you to chew 1 adult-strength or 2 to 4 low-dose aspirin. Wait for an ambulance. Do not try to drive yourself. Watch closely for changes in your health, and be sure to contact your doctor if:    · Your lightheadedness gets worse or does not get better with home care. Where can you learn more? Go to https://[employer]Ya. org and sign in to your [employer] account. Enter V723 in the [employer] box to learn more about \"Lightheadedness or Faintness: Care Instructions. \"     If you do not have an account, please click on the \"Sign Up Now\" link. Current as of: July 1, 2021               Content Version: 13.1  © 1435-8495 Healthwise, Incorporated. Care instructions adapted under license by [facility] ([facility]). If you have questions about a medical condition or this instruction, always ask your healthcare professional. [place] [address] any warranty or liability for your use of this information.

## 2022-02-14 NOTE — PROGRESS NOTES
2/14/22    Kathryn Cost  1995    Tanya Roque is a 27 y.o. female who presents today for evaluation of:  Chief Complaint   Patient presents with    Otalgia     bilateral x AM with white tinged drainage and ringing in the ear    Pharyngitis     Woke this am with ear pain and with drainage coming out of ears. 2 days dysuria. Syncope : she has had additional passing out and had had a negative eval by cardiologist. She passes out for for several hours at a time 6-7. She wakes up after having been on the floor for 5-7 hours. She has fallen but has not gotten hurt so far. Review of Systems   Constitutional: Negative for fever. Respiratory: Negative for cough and shortness of breath. Cardiovascular: Negative for chest pain. Gastrointestinal: Negative for diarrhea and vomiting. Endocrine: Positive for polydipsia and polyuria. No Known Allergies     OBJECTIVE    /70 (Site: Right Upper Arm, Position: Sitting, Cuff Size: Large Adult)   Pulse 96   Temp 97.5 °F (36.4 °C) (Infrared)   Resp 16   Ht 5' 7\" (1.702 m)   Wt 276 lb (125.2 kg)   SpO2 99%   BMI 43.23 kg/m²     Physical Exam   Constitutional:       General: Not in acute distress. Appearance: Normal appearance. Not ill-appearing. Eyes:      General: No scleral icterus. HENT:      Head: Normocephalic. Right Ear: Tympanic membrane, ear canal and external ear normal.      Left Ear: Tympanic membrane, ear canal and external ear normal.   Both ear canals and tympanic membranes appear completely normal although she did report some discomfort when I wiggled her tragus bilaterally. Nose: Nose normal.      Mild bilateral sinus tenderness. Mouth/Throat:      Mouth: Mucous membranes are moist.      Pharynx: No oropharyngeal exudate, posterior oropharyngeal erythema or uvula swelling. Tonsils: No tonsillar exudate or tonsillar abscesses.   Cardiovascular:      Rate and Rhythm: Normal rate and regular rhythm. Heart sounds: No murmur heard. No friction rub. No gallop. Pulmonary:      Effort: Pulmonary effort is normal. No respiratory distress. Breath sounds: No wheezing, rhonchi or rales. Abdominal:      Palpations: Abdomen is soft. There is no mass. Tenderness: There is no abdominal tenderness. Musculoskeletal:     Moves all extremities normally. Skin:     General: Skin is warm. Coloration: Skin is not jaundiced. Neurological:      Mental Status: Patient is alert. Psychiatric:         Behavior: Behavior normal.         Thought Content: Thought content normal.         Judgment: Judgment normal.    Point-of-care urinalysis shows concentrated urine with a specific gravity greater than 1.030. Is negative for glucose. Small blood, 30 protein, negative nitrite, trace leukocyte esterase. ASSESSMENT/PLAN:    1. Acute cystitis with hematuria  -     sulfamethoxazole-trimethoprim (BACTRIM DS;SEPTRA DS) 800-160 MG per tablet; Take 1 tablet by mouth 2 times daily for 3 days, Disp-6 tablet, R-0Normal  2. Otalgia of both ears  3. Dysuria  -     POCT Urinalysis no Micro  -     Culture, Urine  4. Syncope, unspecified syncope type  Assessment & Plan:   She had a negative cardiology evaluation and so now I will refer to neurology. Orders:  -     [facility] Neurology    I am not completely convinced that she has a cystitis but I will treated with a short course of antibiotics. I will also order urine culture. Reassured her regarding the ear pain. Counseling provided for:    >> Exercise - 1> try to do 150 minutes a week of exercise that is as hard as walking briskly (30 minutes 5 days a week or 22 minutes every day); and 2> do some strength training 2 or 3 times a week. Remember to be thankful for the good things in life. Return in about 4 weeks (around 3/14/2022) for [address] [place] [address].      Kaylene Contreras MD

## 2022-02-15 ENCOUNTER — TELEPHONE (OUTPATIENT)
Dept: FAMILY MEDICINE CLINIC | Age: 27
End: 2022-02-15

## 2022-02-15 NOTE — TELEPHONE ENCOUNTER
Pt called stating she has passed out 2 to 3 times this morning, first time 15mins, second time 10mins, then the 3rd time she didn't get a time frame. She stated she knows when she is going to pass out when the room starts spinning. She asked if you could write a work excuse letter for the rest of this week, and return to work on 02/21/2022.

## 2022-02-15 NOTE — TELEPHONE ENCOUNTER
Spoke with patient to inform patient that letter was available for .  Patient expressed verbal understanding

## 2022-02-16 LAB — URINE CULTURE, ROUTINE: NORMAL

## 2022-02-23 ENCOUNTER — TELEPHONE (OUTPATIENT)
Dept: FAMILY MEDICINE CLINIC | Age: 27
End: 2022-02-23

## 2022-02-23 NOTE — TELEPHONE ENCOUNTER
If she is doing okay today I do not think she needs to worry about it being a poisonous spider that will cause serious problems from the poison. However the biggest concern is infection and I recommend an appointment to get it checked out to make sure it does not look infected.

## 2022-02-23 NOTE — TELEPHONE ENCOUNTER
Patient informed. She stated she is unable to come for appointment due to no transportation. Advised her if she thinks it gets infected to go to Urgent Care.

## 2022-02-23 NOTE — TELEPHONE ENCOUNTER
Patient stated she was bit by a spider yesterday. She stated she doesn't know if it was a regular spider or a poisonous spider. She stated she can't see it but her boyfriend told her it has blisters on the bite area.

## 2022-03-16 ENCOUNTER — OFFICE VISIT (OUTPATIENT)
Dept: FAMILY MEDICINE CLINIC | Age: 27
End: 2022-03-16
Payer: MEDICAID

## 2022-03-16 VITALS
WEIGHT: 274 LBS | OXYGEN SATURATION: 100 % | BODY MASS INDEX: 42.91 KG/M2 | TEMPERATURE: 97.2 F | DIASTOLIC BLOOD PRESSURE: 78 MMHG | HEART RATE: 88 BPM | SYSTOLIC BLOOD PRESSURE: 110 MMHG

## 2022-03-16 DIAGNOSIS — E03.9 HYPOTHYROIDISM, UNSPECIFIED TYPE: ICD-10-CM

## 2022-03-16 DIAGNOSIS — E66.01 MORBID OBESITY (HCC): ICD-10-CM

## 2022-03-16 DIAGNOSIS — G47.9 SLEEP DISTURBANCES: ICD-10-CM

## 2022-03-16 DIAGNOSIS — Z00.00 ENCOUNTER FOR WELL ADULT EXAM WITHOUT ABNORMAL FINDINGS: Primary | ICD-10-CM

## 2022-03-16 DIAGNOSIS — S93.401D SPRAIN OF RIGHT ANKLE, UNSPECIFIED LIGAMENT, SUBSEQUENT ENCOUNTER: ICD-10-CM

## 2022-03-16 LAB
T4 FREE: 1 NG/DL (ref 0.9–1.8)
TSH SERPL DL<=0.05 MIU/L-ACNC: 7.32 UIU/ML (ref 0.27–4.2)

## 2022-03-16 PROCEDURE — G8482 FLU IMMUNIZE ORDER/ADMIN: HCPCS | Performed by: FAMILY MEDICINE

## 2022-03-16 PROCEDURE — 36415 COLL VENOUS BLD VENIPUNCTURE: CPT | Performed by: FAMILY MEDICINE

## 2022-03-16 PROCEDURE — 99395 PREV VISIT EST AGE 18-39: CPT | Performed by: FAMILY MEDICINE

## 2022-03-16 RX ORDER — LEVOTHYROXINE SODIUM 88 UG/1
88 TABLET ORAL DAILY
Qty: 90 TABLET | Refills: 0 | Status: CANCELLED | OUTPATIENT
Start: 2022-03-16

## 2022-03-16 RX ORDER — IBUPROFEN 800 MG/1
800 TABLET ORAL 3 TIMES DAILY PRN
Qty: 60 TABLET | Refills: 0 | Status: SHIPPED | OUTPATIENT
Start: 2022-03-16

## 2022-03-16 ASSESSMENT — ENCOUNTER SYMPTOMS
NAUSEA: 0
DIARRHEA: 0
TROUBLE SWALLOWING: 1
BACK PAIN: 0
EYE PAIN: 0
ABDOMINAL PAIN: 0
VOMITING: 0
CONSTIPATION: 1
SHORTNESS OF BREATH: 0
COUGH: 0

## 2022-03-16 NOTE — ASSESSMENT & PLAN NOTE
She has a lot of trouble with sleeping. What she describes is not typical of sleep apnea but she does report some waking up gagging. She would like to have a sleep study to help figure out why she has so much trouble sleeping. normal/airway patent/breath sounds equal/good air movement/respirations non-labored/clear to auscultation bilaterally

## 2022-03-16 NOTE — ASSESSMENT & PLAN NOTE
She is quite muscular so the BMI number is likely not representative of her health. Nevertheless she is eating healthfully with lots of salads.

## 2022-03-16 NOTE — PROGRESS NOTES
3/16/22    Kathryn Cost  1995  27 y.o. female     Adult Annual Preventive Visit  Chief Complaint   Patient presents with    3 Month Follow-Up   Passed out twice since last visit. No injuries and no EtOH or drugs invollved. Activity habits: does daily exercise including situps, pushups, jumping jacks and rucking for exercise. Eating habits: Healthy    Sleep habits: 3-4 hr /night. Trouble falling asleep and staying asleep. No waking up choking. Boyfriend says she gasps for air when sleepign. Not a lot of daytime tiredness. Social support: Good    Mentation:   No or minimal trouble with memory, Learning new things and Brain seems to be working correctly    Review of Systems   Constitutional: Negative for fatigue and fever. HENT: Positive for trouble swallowing. Negative for nosebleeds. Eyes: Negative for pain and visual disturbance. Respiratory: Negative for cough and shortness of breath. Cardiovascular: Positive for chest pain. Negative for leg swelling. Gastrointestinal: Positive for constipation. Negative for abdominal pain, diarrhea, nausea and vomiting. Endocrine: Negative for cold intolerance, heat intolerance and polyuria. Genitourinary: Negative for difficulty urinating, hematuria, vaginal bleeding, vaginal discharge and vaginal pain. Musculoskeletal: Negative for arthralgias, back pain, gait problem and neck pain. Skin: Negative for rash and wound. Allergic/Immunologic: Negative for food allergies and immunocompromised state. Neurological: Positive for dizziness and light-headedness. Negative for tremors, seizures, speech difficulty, weakness, numbness and headaches. Hematological: Negative for adenopathy. Does not bruise/bleed easily. Psychiatric/Behavioral: Negative for decreased concentration, dysphoric mood and suicidal ideas. The patient is not nervous/anxious.       Fasting: No    No Known Allergies     Current Outpatient Medications   Medication Sig Dispense Refill    ibuprofen (ADVIL;MOTRIN) 800 MG tablet Take 1 tablet by mouth 3 times daily as needed for Pain 60 tablet 0    levothyroxine (SYNTHROID) 88 MCG tablet Take 1 tablet by mouth daily 90 tablet 0     No current facility-administered medications for this visit. OBJECTIVE    /78 (Site: Right Upper Arm, Position: Sitting, Cuff Size: Large Adult)   Pulse 88   Temp 97.2 °F (36.2 °C) (Infrared)   Wt 274 lb (124.3 kg)   SpO2 100%   BMI 42.91 kg/m²     Physical Exam   Constitutional:       General: Not in acute distress. Appearance: Normal appearance. Not ill-appearing, toxic-appearing or diaphoretic. HENT:      Head: Normocephalic. Right Ear: Tympanic membrane, ear canal and external ear normal.      Left Ear: Tympanic membrane, ear canal and external ear normal.      Nose: No rhinorrhea. Mouth/Throat:      Mouth: Mucous membranes are moist.      Pharynx: Oropharynx is clear. No oropharyngeal exudate or posterior oropharyngeal erythema. Neck circ 15.5 inches. Eyes:      General: No scleral icterus. Right eye: No discharge. Left eye: No discharge. Conjunctiva/sclera: Conjunctivae normal.   Neck:      Thyroid: No thyroid mass, thyromegaly or thyroid tenderness. Cardiovascular:      Rate and Rhythm: Normal rate and regular rhythm. Pulses:           Posterior tibial pulses are 2+ on the right side and 2+ on the left side. Heart sounds: Normal heart sounds. No murmur heard. No friction rub. No gallop. Pulmonary:      Effort: Pulmonary effort is normal. No respiratory distress. Breath sounds: Normal breath sounds. No stridor. No wheezing, rhonchi or rales. Abdominal:      General: There is no distension. Palpations: Abdomen is soft. Tenderness: There is no abdominal tenderness. There is no guarding. Musculoskeletal:         General: No deformity. Cervical back: No rigidity. Right lower leg: No edema.       Left lower leg: No edema. Lymphadenopathy:      Cervical: No cervical adenopathy. Right upper body: No supraclavicular or axillary adenopathy. Left upper body: No supraclavicular or axillary adenopathy. Lower Body: No right inguinal adenopathy. No left inguinal adenopathy. Skin:     General: Skin is warm. Coloration: Skin is not jaundiced. Neurological:      Mental Status: She is alert. Deep Tendon Reflexes:      Reflex Scores:       Patellar reflexes are 2+ on the right side and 2+ on the left side. Psychiatric:         Attention and Perception: Attention and perception normal.         Mood and Affect: Mood normal.         Speech: Speech normal.         Behavior: Behavior normal.         Thought Content: Thought content normal.    ASSESSMENT AND PLAN    1. Encounter for well adult exam without abnormal findings  -     External Referral To Gynecology  2. Hypothyroidism, unspecified type  Assessment & Plan:   Aching TSH and T4 today. Plan to continue levothyroxine. Orders:  -     T4, Free  -     TSH  3. Sprain of right ankle, unspecified ligament, subsequent encounter  Assessment & Plan:   Providing and ibuprofen refill. Orders:  -     ibuprofen (ADVIL;MOTRIN) 800 MG tablet; Take 1 tablet by mouth 3 times daily as needed for Pain, Disp-60 tablet, R-0Normal  4. Sleep disturbances  Assessment & Plan:   She has a lot of trouble with sleeping. What she describes is not typical of sleep apnea but she does report some waking up gagging. She would like to have a sleep study to help figure out why she has so much trouble sleeping. Orders:  -     [address]  5. Morbid obesity ([facility] [address]  Assessment & Plan:   She is quite muscular so the BMI number is likely not representative of her health. Nevertheless she is eating healthfully with lots of salads. Counseling provided for:  Healthy eating - Avoid sugar and other refined carbohydrates.   and Eat more foods with fiber like vegetables and whole grain products.   >> Exercise - 1> try to do 150 minutes a week of exercise that is as hard as walking briskly (30 minutes 5 days a week or 22 minutes every day); and 2> do some strength training 2 or 3 times a week. Mentally activity - Keep trying to learn new things, reading, following sports/fashion/whatever you like, and other mental things to keep your brain working well and avoid dementia. , Alcohol limitation - Limit alcohol consumption to 1(women) or 2(men) standard sized drinks a day. and Cannabis - Heavy cannabis use will lead to a significant decline in your IQ. Remember to be thankful for the good things in life. No follow-ups on file.      Meche Guzman MD

## 2022-03-16 NOTE — PATIENT INSTRUCTIONS
Advance Directives: Care Instructions  Overview  An advance directive is a legal way to state your wishes at the end of your life. It tells your family and your doctor what to do if you can't say what you want. There are two main types of advance directives. You can change them any time your wishes change. Living will. This form tells your family and your doctor your wishes about life support and other treatment. The form is also called a declaration. Medical power of . This form lets you name a person to make treatment decisions for you when you can't speak for yourself. This person is called a health care agent (health care proxy, health care surrogate). The form is also called a durable power of  for health care. If you do not have an advance directive, decisions about your medical care may be made by a family member, or by a doctor or a  who doesn't know you. It may help to think of an advance directive as a gift to the people who care for you. If you have one, they won't have to make tough decisions by themselves. Follow-up care is a key part of your treatment and safety. Be sure to make and go to all appointments, and call your doctor if you are having problems. It's also a good idea to know your test results and keep a list of the medicines you take. What should you include in an advance directive? Many states have a unique advance directive form. (It may ask you to address specific issues.) Or you might use a universal form that's approved by many states. If your form doesn't tell you what to address, it may be hard to know what to include in your advance directive. Use the questions below to help you get started. · Who do you want to make decisions about your medical care if you are not able to? · What life-support measures do you want if you have a serious illness that gets worse over time or can't be cured? · What are you most afraid of that might happen?  (Maybe you're afraid of having pain, losing your independence, or being kept alive by machines.)  · Where would you prefer to die? (Your home? A hospital? A nursing home?)  · Do you want to donate your organs when you die? · Do you want certain Mormon practices performed before you die? When should you call for help? Be sure to contact your doctor if you have any questions. Where can you learn more? Go to https://chpepiceweb.[email or web address]-[email or web address] org and sign in to your Needly account. Enter R264 in the Vigo box to learn more about \"Advance Directives: Care Instructions. \"     If you do not have an account, please click on the \"Sign Up Now\" link. Current as of: March 17, 2021               Content Version: 13.1  © 6849-5115 Healthwise, Incorporated. Care instructions adapted under license by [facility] ([facility]). If you have questions about a medical condition or this instruction, always ask your healthcare professional. [place] [address] any warranty or liability for your use of this information. Eating Healthy Foods: Care Instructions  Your Care Instructions     Eating healthy foods can help lower your risk for disease. Healthy food gives you energy and keeps your heart strong, your brain active, your muscles working, and your bones strong. A healthy diet includes a variety of foods from the basic food groups: grains, vegetables, fruits, milk and milk products, and meat and beans. Some people may eat more of their favorite foods from only one food group and, as a result, miss getting the nutrients they need. So, it is important to pay attention not only to what you eat but also to what you are missing from your diet. You can eat a healthy, balanced diet by making a few small changes. Follow-up care is a key part of your treatment and safety. Be sure to make and go to all appointments, and call your doctor if you are having problems.  It's also a good idea to know your test results and keep a list of the medicines you take. How can you care for yourself at home? Look at what you eat  · Keep a food diary for a week or two and record everything you eat or drink. Track the number of servings you eat from each food group. · For a balanced diet every day, eat a variety of:  ? 6 or more ounce-equivalents of grains, such as cereals, breads, crackers, rice, or pasta, every day. An ounce-equivalent is 1 slice of bread, 1 cup of ready-to-eat cereal, or ½ cup of cooked rice, cooked pasta, or cooked cereal.  ? 2½ cups of vegetables, especially:  § Dark-green vegetables such as broccoli and spinach. § Orange vegetables such as carrots and sweet potatoes. § Dry beans (such as isabel and kidney beans) and peas (such as lentils). ? 2 cups of fresh, frozen, or canned fruit. A small apple or 1 banana or orange equals 1 cup. ? 3 cups of nonfat or low-fat milk, yogurt, or other milk products. ? 5½ ounces of meat and beans, such as chicken, fish, lean meat, beans, nuts, and seeds. One egg, 1 tablespoon of peanut butter, ½ ounce nuts or seeds, or ¼ cup of cooked beans equals 1 ounce of meat. · Learn how to read food labels for serving sizes and ingredients. Fast-food and convenience-food meals often contain few or no fruits or vegetables. Make sure you eat some fruits and vegetables to make the meal more nutritious. · Look at your food diary. For each food group, add up what you have eaten and then divide the total by the number of days. This will give you an idea of how much you are eating from each food group. See if you can find some ways to change your diet to make it more healthy. Start small  · Do not try to make dramatic changes to your diet all at once. You might feel that you are missing out on your favorite foods and then be more likely to fail. · Start slowly, and gradually change your habits. Try some of the following:  ? Use whole wheat bread instead of white bread. ?  Use nonfat or low-fat milk instead of whole milk. ? Eat brown rice instead of white rice, and eat whole wheat pasta instead of white-flour pasta. ? Try low-fat cheeses and low-fat yogurt. ? Add more fruits and vegetables to meals and have them for snacks. ? Add lettuce, tomato, cucumber, and onion to sandwiches. ? Add fruit to yogurt and cereal.  Enjoy food  · You can still eat your favorite foods. You just may need to eat less of them. If your favorite foods are high in fat, salt, and sugar, limit how often you eat them, but do not cut them out entirely. · Eat a wide variety of foods. Make healthy choices when eating out  · The type of restaurant you choose can help you make healthy choices. Even fast-food chains are now offering more low-fat or healthier choices on the menu. · Choose smaller portions, or take half of your meal home. · When eating out, try:  ? A veggie pizza with a whole wheat crust or grilled chicken (instead of sausage or pepperoni). ? Pasta with roasted vegetables, grilled chicken, or marinara sauce instead of cream sauce. ? A vegetable wrap or grilled chicken wrap. ? Broiled or poached food instead of fried or breaded items. Make healthy choices easy  · Buy packaged, prewashed, ready-to-eat fresh vegetables and fruits, such as baby carrots, salad mixes, and chopped or shredded broccoli and cauliflower. · Buy packaged, presliced fruits, such as melon or pineapple. · Choose 100% fruit or vegetable juice instead of soda. Limit juice intake to 4 to 6 oz (½ to ¾ cup) a day. · Blend low-fat yogurt, fruit juice, and canned or frozen fruit to make a smoothie for breakfast or a snack. Where can you learn more? Go to https://TicketFiredejuan.800APP. org and sign in to your CPA Exchange account. Enter U500 in the KylesBharat Matrimony box to learn more about \"Eating Healthy Foods: Care Instructions. \"     If you do not have an account, please click on the \"Sign Up Now\" link.   Current as of: September 8, 2021               Content Version: 13.1  © 6559-4318 Healthwise, Bouncefootball. Care instructions adapted under license by [facility] ([facility]). If you have questions about a medical condition or this instruction, always ask your healthcare professional. [address] any warranty or liability for your use of this information. Well Visit, Ages 25 to 48: Care Instructions  Overview     Well visits can help you stay healthy. Your doctor has checked your overall health and may have suggested ways to take good care of yourself. Your doctor also may have recommended tests. At home, you can help prevent illness with healthy eating, regular exercise, and other steps. Follow-up care is a key part of your treatment and safety. Be sure to make and go to all appointments, and call your doctor if you are having problems. It's also a good idea to know your test results and keep a list of the medicines you take. How can you care for yourself at home? · Get screening tests that you and your doctor decide on. Screening helps find diseases before any symptoms appear. · Eat healthy foods. Choose fruits, vegetables, whole grains, protein, and low-fat dairy foods. Limit fat, especially saturated fat. Reduce salt in your diet. · Limit alcohol. If you are a man, have no more than 2 drinks a day or 14 drinks a week. If you are a woman, have no more than 1 drink a day or 7 drinks a week. · Get at least 30 minutes of physical activity on most days of the week. Walking is a good choice. You also may want to do other activities, such as running, swimming, cycling, or playing tennis or team sports. Discuss any changes in your exercise program with your doctor. · Reach and stay at a healthy weight. This will lower your risk for many problems, such as obesity, diabetes, heart disease, and high blood pressure. · Do not smoke or allow others to smoke around you.  If you need help quitting, talk to your doctor Search Health Information box to learn more about \"Well Visit, Ages 25 to 48: Care Instructions. \"     If you do not have an account, please click on the \"Sign Up Now\" link. Current as of: October 6, 2021               Content Version: 13.1  © 2598-0534 Healthwise, Incorporated. Care instructions adapted under license by [facility] ([facility]). If you have questions about a medical condition or this instruction, always ask your healthcare professional. [address] any warranty or liability for your use of this information. High-Fiber Diet     What Is Fiber? Dietary fiber is a form of carbohydrate found in plants that cannot be digested by humans. All plants contain fiber, including fruits, vegetables, grains, and legumes. Fiber is often classified into two categories: soluble and insoluble. · Soluble fiber draws water into the bowel and can help slow digestion. Examples of foods that are high in soluble fiber include oatmeal, oat bran, barley, legumes (eg, beans and peas), apples, and strawberries. · Insoluble fiber speeds digestion and can add bulk to the stool. Examples of foods that are high in insoluble fiber include whole-wheat products, wheat bran, cauliflower, green beans, and potatoes. Why Follow a High-Fiber Diet? A high-fiber diet is often recommended to prevent and treat constipation , hemorrhoids , diverticulitis , and irritable bowel syndrome . Eating a high-fiber diet can also help improve your cholesterol levels, lower your risk of coronary heart disease , reduce your risk of type 2 diabetes , and lower your weight. For people with type 1 or 2 diabetes, a high-fiber diet can also help stabilize blood sugar levels. How Much Fiber Should I Eat? A high-fiber diet should contain  20-35 grams  of fiber a day. This is actually the amount recommended for the general adult population; however, most Americans eat only 15 grams of fiber per day.    Digestion of Fiber   Eating a higher fiber diet than usual can take some getting used to by your body's digestive system. To avoid the side effects of sudden increases in dietary fiber (eg, gas, cramping, bloating, and diarrhea), increase fiber gradually and be sure to drink plenty of fluids every day. Tips for Increasing Fiber Intake   · Whenever possible, choose whole grains over refined grains (eg, brown rice instead of white rice, whole-wheat bread instead of white bread). · Include a variety of grains in your diet, such as wheat, rye, barley, oats, quinoa, and bulgur. · Eat more vegetarian-based meals. Here are some ideas: black bean burgers, eggplant lasagna, and veggie tofu stir-mariee. · Choose high-fiber snacks, such as fruits, popcorn, whole-grain crackers, and nuts. · Make whole-grain cereal or whole-grain toast part of your daily breakfast regime. · When eating out, whether ordering a sandwich or dinner, ask for extra vegetables. · When baking, replace part of the white flour with whole-wheat flour. Whole-wheat flour is particularly easy to incorporate into a recipe. High-Fiber Diet Eating Guide   Food Category   Foods Recommended   Notes   Grains   Whole-grain breads, muffins, bagels, or sharona bread Rye bread Whole-wheat crackers or crisp breads Whole-grain or bran cereals Oatmeal, oat bran, or grits Wheat germ Whole-wheat pasta and brown rice   Read the ingredients list on food labels. Look for products that list \"whole\" as the first ingredient (eg, whole-wheat, whole oats). Choose cereals with at least 2 grams of fiber per serving.    Vegetables   All vegetables, especially asparagus, bean sprouts, broccoli, [place] sprouts, cabbage, carrots, cauliflower, celery, corn, greens, green beans, green pepper, onions, peas, potatoes (with skin), snow peas, spinach, squash, sweet potatoes, tomatoes, zucchini   For maximum fiber intake, eat the peels of fruits and vegetablesjust be sure to wash them well first. Fruits   All fruits, especially apples, berries, grapefruits, mangoes, nectarines, oranges, peaches, pears, dried fruits (figs, dates, prunes, raisins)   Choose raw fruits and vegetables over juice, cooked, or cannedraw fruit has more fiber. Dried fruit is also a good source of fiber. Milk   With the exception of yogurt containing inulin (a type of fiber), dairy foods provide little fiber. Add more fiber by topping your yogurt or cottage cheese with fresh fruit, whole grain or bran cereals, nuts, or seeds. Meats and Beans   All beans and peas, especially Garbanzo beans, kidney beans, lentils, lima beans, split peas, and isabel beans All nuts and seeds, especially almonds, peanuts, [place] nuts, cashews, peanut butter, walnuts, sesame and sunflower seeds All meat, poultry, fish, and eggs   Increase fiber in meat dishes by adding isabel beans, kidney beans, black-eyed peas, bran, or oatmeal. If you are following a low-fat diet, use nuts and seeds only in moderation. Fats and Oils   All in moderation   Fats and oils do not provide fiber   Snacks, Sweets, and Condiments   Fruit Nuts Popcorn, whole-wheat pretzels, or trail mix made with dried fruits, nuts, and seeds Cakes, breads, and cookies made with oatmeal or whole-wheat flour   Most snack foods do not provide much fiber. Choose snacks with at least 2 grams of fiber per serving.      Last Reviewed: March 2011 Zack Stokes MS, MPH, RD   Updated: 3/29/2011

## 2022-03-17 RX ORDER — LEVOTHYROXINE SODIUM 0.1 MG/1
100 TABLET ORAL DAILY
Qty: 90 TABLET | Refills: 0 | Status: SHIPPED | OUTPATIENT
Start: 2022-03-17 | End: 2022-04-28 | Stop reason: SDUPTHER

## 2022-03-31 ENCOUNTER — HOSPITAL ENCOUNTER (OUTPATIENT)
Dept: SLEEP CENTER | Age: 27
Discharge: HOME | End: 2022-03-31
Payer: MEDICAID

## 2022-03-31 VITALS — WEIGHT: 262 LBS | HEIGHT: 67 IN | BODY MASS INDEX: 41.12 KG/M2

## 2022-03-31 DIAGNOSIS — F51.02 SLEEP STATE MISPERCEPTION: ICD-10-CM

## 2022-03-31 DIAGNOSIS — G47.33 OSA (OBSTRUCTIVE SLEEP APNEA): ICD-10-CM

## 2022-03-31 DIAGNOSIS — E66.01 MORBID OBESITY WITH BMI OF 40.0-44.9, ADULT (HCC): ICD-10-CM

## 2022-03-31 DIAGNOSIS — G47.10 HYPERSOMNIA: ICD-10-CM

## 2022-03-31 PROCEDURE — 99211 OFF/OP EST MAY X REQ PHY/QHP: CPT

## 2022-03-31 PROCEDURE — 99204 OFFICE O/P NEW MOD 45 MIN: CPT | Performed by: INTERNAL MEDICINE

## 2022-03-31 ASSESSMENT — SLEEP AND FATIGUE QUESTIONNAIRES
HOW LIKELY ARE YOU TO NOD OFF OR FALL ASLEEP WHILE SITTING QUIETLY AFTER LUNCH WITHOUT ALCOHOL: 0
HOW LIKELY ARE YOU TO NOD OFF OR FALL ASLEEP WHILE SITTING INACTIVE IN A PUBLIC PLACE: 0
HOW LIKELY ARE YOU TO NOD OFF OR FALL ASLEEP WHILE SITTING AND READING: 0
HOW LIKELY ARE YOU TO NOD OFF OR FALL ASLEEP IN A CAR, WHILE STOPPED FOR A FEW MINUTES IN TRAFFIC: 0
HOW LIKELY ARE YOU TO NOD OFF OR FALL ASLEEP WHILE LYING DOWN TO REST IN THE AFTERNOON WHEN CIRCUMSTANCES PERMIT: 0
HOW LIKELY ARE YOU TO NOD OFF OR FALL ASLEEP WHEN YOU ARE A PASSENGER IN A CAR FOR AN HOUR WITHOUT A BREAK: 0
HOW LIKELY ARE YOU TO NOD OFF OR FALL ASLEEP WHILE WATCHING TV: 1
ESS TOTAL SCORE: 1
HOW LIKELY ARE YOU TO NOD OFF OR FALL ASLEEP WHILE SITTING AND TALKING TO SOMEONE: 0

## 2022-03-31 NOTE — CONSULTS
Lopez Camilo MD, Diana Vaughan MD, Nuria Squires MD, Golden Jacome MD, [facility]      [address], [address] [facility]   PH: [phone]  F: [phone]     Subjective:     Patient ID: Bethany Green is a 27 y.o. female, referred to the sleep center for   Chief Complaint   Patient presents with    Other     sleep disorder   . Referring physician: Anika Sr MD    History: has been referred for the insufficient sleep x 6 years. She says that she sleeps for less than 4 hours per night.      Symptoms:   [x]  Snoring                                                                    []  Dry Mouth  []  Choking                                                                   [x]  Morning Headaches  []  Gasping for Air                                                        []  Trouble Falling asleep  []  Tired during the daytime                                         []  Trouble Staying Asleep  []  Tired when you wake up                                         [x]  Weight Gain in Last 5 Years  [x]  Wake up frequently at night                                    []  Weight Loss in Last 5 Years  []  Shortness Of Breath                                               []  Shift Worker  []  Coughing                                                                []  Smoker (Previous or Current)  []  Chest Pain                                                              []  Anxiety  []  Trouble keeping your legs still at night                   []  Depression  []  Kicking your legs in your sleep                               []  Insomnia     [] Palpitation  []   Stop breathing      []  Other:     Significant Co-morbidities:  []  Congestive Heart Failure     []  COPD         []  Stroke (Past 30 Days)      []  Supplemental Oxygen Usage       []  Cognitive Impairment      []  Neuromuscular Problems  [] Epilepsy/Neurological Disorders           Social History     Socioeconomic History    Marital status: Single     Spouse name: Not on file    Number of children: Not on file    Years of education: Not on file    Highest education level: Not on file   Occupational History    Not on file   Tobacco Use    Smoking status: Never Smoker    Smokeless tobacco: Never Used   Vaping Use    Vaping Use: Never used   Substance and Sexual Activity    Alcohol use: Never     Comment: caffeine none    Drug use: Never    Sexual activity: Yes     Partners: Male   Other Topics Concern    Not on file   Social History Narrative    Not on file     Social Determinants of Health     Financial Resource Strain: Low Risk     Difficulty of Paying Living Expenses: Not hard at all   Food Insecurity: No Food Insecurity    Worried About Running Out of Food in the Last Year: Never true    [address] in the Last Year: Never true   Transportation Needs:     Lack of Transportation (Medical): Not on file    Lack of Transportation (Non-Medical):  Not on file   Physical Activity:     Days of Exercise per Week: Not on file    Minutes of Exercise per Session: Not on file   Stress:     Feeling of Stress : Not on file   Social Connections:     Frequency of Communication with Friends and Family: Not on file    Frequency of Social Gatherings with Friends and Family: Not on file    Attends Scientologist Services: Not on file    Active Member of [address] [place] [address] or Organizations: Not on file    Attends Club or Organization Meetings: Not on file    Marital Status: Not on file   Intimate Partner Violence:     Fear of Current or Ex-Partner: Not on file    Emotionally Abused: Not on file    Physically Abused: Not on file    Sexually Abused: Not on file   Housing Stability:     Unable to Pay for Housing in the Last Year: Not on file    Number of [place] in the Last Year: Not on file    Unstable Housing in the Last Year: Not on file Prior to Admission medications    Medication Sig Start Date End Date Taking? Authorizing Provider   levothyroxine (SYNTHROID) 100 MCG tablet Take 1 tablet by mouth daily 3/17/22  Yes Radha Cano MD   ibuprofen (ADVIL;MOTRIN) 800 MG tablet Take 1 tablet by mouth 3 times daily as needed for Pain 3/16/22  Yes Radha Cano MD       Allergies as of 03/31/2022    (No Known Allergies)       Patient Active Problem List   Diagnosis    Morbid obesity ([facility]    Hypothyroidism, unspecified    History of iron deficiency anemia    Sprain of ankle    Syncope    Snoring    Sleep disturbances    JENNIFER (obstructive sleep apnea)    Morbid obesity with BMI of 40.0-44.9, adult ([facility] [address] [facility]    Sleep state misperception    Hypersomnia       Past Medical History:   Diagnosis Date    Hypothyroidism 2017       Past Surgical History:   Procedure Laterality Date    WISDOM TOOTH EXTRACTION         Family History   Problem Relation Age of Onset    Depression Mother     Diabetes Mother     Heart Attack Father     Other Father         DVT with PE    High Blood Pressure Sister     Diabetes Maternal Grandmother     Diabetes Maternal Grandfather          Objective:   Ht 5' 7\" (1.702 m)   Wt 262 lb (118.8 kg)   BMI 41.04 kg/m²   Body mass index is 41.04 kg/m². Sleep Medicine 3/31/2022   Sitting and reading 0   Watching TV 1   Sitting, inactive in a public place (e.g. a theatre or a meeting) 0   As a passenger in a car for an hour without a break 0   Lying down to rest in the afternoon when circumstances permit 0   Sitting and talking to someone 0   Sitting quietly after a lunch without alcohol 0   In a car, while stopped for a few minutes in traffic 0   Total score 1   Neck circumference (Inches) 16.25     Mallampati 3    Vitals:    03/31/22 0944   Weight: 262 lb (118.8 kg)   Height: 5' 7\" (1.702 m)     Neck circumference (Inches): 16.25  Inches  [place] - Total score: 1    Gen: No distress. Eyes: PERRL.  No sclera icterus. No conjunctival injection. ENT: No discharge. Pharynx clear. External appearance of ears and nose normal.OVERJET  Neck: Trachea midline. No obvious mass. Resp: No accessory muscle use. No crackles. No wheezes. No rhonchi. No dullness on percussion. CV: Regular rate. Regular rhythm. No murmur or rub. No edema. GI: Non-tender. Non-distended. No hernia. Skin: Warm, dry, normal texture and turgor. No nodule on exposed extremities. Lymph: No cervical LAD. No supraclavicular LAD. M/S: No cyanosis. No clubbing. No joint deformity. Psych: Oriented x 3. No anxiety. Awake. Alert. Intact judgement and insight. Mallampati Airway Classification:   []1 []2 [x]3 []4        Assessment and Plan     Diagnosis:    Problem List        Pulmonary Problems    JENNIFER (obstructive sleep apnea)      She has symptoms of JENNIFER  Advised to go for the PSG  Loose weight         Relevant Orders    Baseline Diagnostic Sleep Study       Other    Morbid obesity with BMI of 40.0-44.9, adult ([facility] [address]      Advised to loose weight with diet and exercise           Sleep state misperception      I suspect that she has this  But I'll do a PSG to confirm this  Loose weight  Sleep hygiene measures         Hypersomnia      Advised to go for the PSG  Loose weight                     Additional Plan:     [x]  Sleep hygiene/ relaxation methods & CBTi principles review with patient   [x]  Avoid supine/back sleep until sleep study   [x]  Driving precautions   [x]  Medical consequences of untreated JENNIFER   [x]  Weight loss recommendations   [x]  Diet recommendations   [x]  Exercise   []  Advised to quit smoking       []  PFT referral   []  Bariatric Program referral      Follow-Up:    No follow-ups on file.     Electronically signed by Lacey Quinn MD on 3/31/2022 at 10:02 AM

## 2022-03-31 NOTE — ASSESSMENT & PLAN NOTE
I suspect that she has this  But I'll do a PSG to confirm this  Loose weight  Sleep hygiene measures

## 2022-04-05 ENCOUNTER — TELEPHONE (OUTPATIENT)
Dept: FAMILY MEDICINE CLINIC | Age: 27
End: 2022-04-05

## 2022-04-05 NOTE — TELEPHONE ENCOUNTER
It sounds like you are taking all the usual medicines to help with constipation. Make sure you are also drinking enough water and eating foods with fiber. You could add a laxative such as senna. If you are having any trouble getting appointments with the neurologist or the sleep center please let me know.

## 2022-04-05 NOTE — TELEPHONE ENCOUNTER
No BM in 4-5 days, she has tried stool softeners, Milk Of Mag, miralax and magnesium citrate with no relief please advise. She has no way to get here for an appointment,  Past out 2 times since last time she was here just wanted to let you know.

## 2022-04-27 ENCOUNTER — OFFICE VISIT (OUTPATIENT)
Dept: FAMILY MEDICINE CLINIC | Age: 27
End: 2022-04-27
Payer: MEDICAID

## 2022-04-27 ENCOUNTER — NURSE ONLY (OUTPATIENT)
Dept: FAMILY MEDICINE CLINIC | Age: 27
End: 2022-04-27
Payer: MEDICAID

## 2022-04-27 VITALS
BODY MASS INDEX: 43.32 KG/M2 | TEMPERATURE: 96.9 F | HEIGHT: 67 IN | SYSTOLIC BLOOD PRESSURE: 112 MMHG | DIASTOLIC BLOOD PRESSURE: 76 MMHG | HEART RATE: 87 BPM | OXYGEN SATURATION: 99 % | WEIGHT: 276 LBS

## 2022-04-27 DIAGNOSIS — E66.01 MORBID OBESITY (HCC): ICD-10-CM

## 2022-04-27 DIAGNOSIS — G47.33 OSA (OBSTRUCTIVE SLEEP APNEA): ICD-10-CM

## 2022-04-27 DIAGNOSIS — E03.9 HYPOTHYROIDISM, UNSPECIFIED TYPE: ICD-10-CM

## 2022-04-27 DIAGNOSIS — R11.0 NAUSEA: Primary | ICD-10-CM

## 2022-04-27 DIAGNOSIS — N91.1 SECONDARY AMENORRHEA: ICD-10-CM

## 2022-04-27 DIAGNOSIS — E03.9 ACQUIRED HYPOTHYROIDISM: ICD-10-CM

## 2022-04-27 LAB
T4 FREE: 1.3 NG/DL (ref 0.9–1.8)
TSH SERPL DL<=0.05 MIU/L-ACNC: 4.26 UIU/ML (ref 0.27–4.2)

## 2022-04-27 PROCEDURE — 1036F TOBACCO NON-USER: CPT | Performed by: FAMILY MEDICINE

## 2022-04-27 PROCEDURE — G8427 DOCREV CUR MEDS BY ELIG CLIN: HCPCS | Performed by: FAMILY MEDICINE

## 2022-04-27 PROCEDURE — 36415 COLL VENOUS BLD VENIPUNCTURE: CPT | Performed by: FAMILY MEDICINE

## 2022-04-27 PROCEDURE — 99214 OFFICE O/P EST MOD 30 MIN: CPT | Performed by: FAMILY MEDICINE

## 2022-04-27 PROCEDURE — G8417 CALC BMI ABV UP PARAM F/U: HCPCS | Performed by: FAMILY MEDICINE

## 2022-04-27 RX ORDER — ONDANSETRON 4 MG/1
4 TABLET, FILM COATED ORAL EVERY 8 HOURS PRN
Qty: 30 TABLET | Refills: 0 | Status: SHIPPED | OUTPATIENT
Start: 2022-04-27 | End: 2022-07-12

## 2022-04-27 ASSESSMENT — ENCOUNTER SYMPTOMS
ABDOMINAL PAIN: 1
ANAL BLEEDING: 0
CONSTIPATION: 0
DIARRHEA: 0
VOMITING: 0
BLOOD IN STOOL: 0
ABDOMINAL DISTENTION: 1
NAUSEA: 1
RECTAL PAIN: 0

## 2022-04-27 NOTE — PROGRESS NOTES
4/27/22    Kathryn Cost  1995    Kadi Woodson is a 27 y.o. female who presents today for evaluation of:  Chief Complaint   Patient presents with    Nausea     everything is making her sick x 3 week     3-4 weeks of nausea and some stomach pain. Cannot tolerate Sprite. Pregnancy tests at home negative. No diarhea or constipation. The stomach pain is stabbing and bilateral about level of umbilicus. No fever. No change in diet. No fatigue. Not vomiting much. Mild heartburn. This feels different than the heartburn she formerly took omeprazole for. No menses x 3 months. No breast changes or other sxs of pregnancy. About the same after eating. Review of Systems   Constitutional: Negative for fatigue and fever. Cardiovascular: Negative for chest pain, palpitations and leg swelling. Gastrointestinal: Positive for abdominal distention, abdominal pain and nausea. Negative for anal bleeding, blood in stool, constipation, diarrhea, rectal pain and vomiting. Endocrine: Negative for polyuria. Genitourinary: Negative for dysuria, hematuria, vaginal bleeding, vaginal discharge and vaginal pain. Neurological: Positive for dizziness. Negative for headaches. Fasting: Yes    No Known Allergies     OBJECTIVE    /76 (Site: Right Upper Arm, Position: Sitting, Cuff Size: Large Adult)   Pulse 87   Temp 96.9 °F (36.1 °C) (Infrared)   Ht 5' 7\" (1.702 m)   Wt 276 lb (125.2 kg)   SpO2 99%   BMI 43.23 kg/m²     Physical Exam   Constitutional:       General: Not in acute distress. Appearance: Normal appearance. Not ill-appearing. Eyes:      General: No scleral icterus. Cardiovascular:      Rate and Rhythm: Normal rate and regular rhythm. Heart sounds: No murmur heard. No friction rub. No gallop. Pulmonary:      Effort: Pulmonary effort is normal. No respiratory distress. Breath sounds: No wheezing, rhonchi or rales. Abdominal:      Palpations: Abdomen is soft.  There is no mass.      Tenderness: There is mild abdominal tenderness. No CVAT. Musculoskeletal:     Moves all extremities normally. Skin:     General: Skin is warm. Coloration: Skin is not jaundiced. Neurological:      Mental Status: Patient is alert. Psychiatric:         Behavior: Behavior normal.         Thought Content: Thought content normal.         Judgment: Judgment normal.    Reviewed: 9/29/21 CBC. ASSESSMENT/PLAN:    1. Nausea  Assessment & Plan:   She has had nausea for some time. I will do blood tests to check for possible celiac disease. Also I will prescribe Zofran for some nausea relief. Orders:  -     Comprehensive Metabolic Panel  -     Celiac Screen with Reflex  -     ondansetron (ZOFRAN) 4 MG tablet; Take 1 tablet by mouth every 8 hours as needed for Nausea or Vomiting, Disp-30 tablet, R-0Normal  2. Acquired hypothyroidism  Assessment & Plan:   TSH and free T4 were drawn today. 3. Morbid obesity ([address]  Assessment & Plan:   She exercises a large amount. Encouraged continued exercise and healthy eating. 4. Secondary amenorrhea  Assessment & Plan:   I will order a prolactin, and follicle-stimulating hormone as well as the TSH that was drawn today and a serum pregnancy test to evaluate secondary amenorrhea. Orders:  -     Prolactin  -     Follicle Stimulating Hormone  -     HCG, SERUM, QUALITATIVE            Return in about 2 weeks (around 5/11/2022) for nausea.      Jeimy Peerz MD

## 2022-04-27 NOTE — ASSESSMENT & PLAN NOTE
I will order a prolactin, and follicle-stimulating hormone as well as the TSH that was drawn today and a serum pregnancy test to evaluate secondary amenorrhea. The patient is a 81y Female complaining of

## 2022-04-27 NOTE — PATIENT INSTRUCTIONS
Patient Education        Secondary Amenorrhea: Care Instructions  Overview     Amenorrhea means you do not have menstrual periods. There are two types. Primary amenorrhea means you never start your periods. Secondary amenorrhea means you have had periods, and then they stop, especially for more than 3months. Even if you don't have periods, you could still get pregnant. You may not know what caused your periods to stop. Possible causes include pregnancy, hormonal changes, and losing or gaining a lot of weight quickly. Some medicines and stress could also cause it. Being active in endurance sports can also cause you to miss your period or stop menstruating. Losing weight or maintaining a low weight in harmful ways could also stop your period. These include dieting too much or binging and purging. But doing these things can lead to eating disorders, amenorrhea, and osteoporosis. If you exercise less or gain a little weight, your periods willprobably start again. Your doctor may order tests to find out why your periods have stopped. Treatment depends on the cause. Your doctor may prescribe hormone therapy tohelp regulate your cycle. This can also help protect against bone loss. Follow-up care is a key part of your treatment and safety. Be sure to make and go to all appointments, and call your doctor if you are having problems. It's also a good idea to know your test results and keep alist of the medicines you take. How can you care for yourself at home?  Eat a healthy, balanced diet. This includes fruits, vegetables, whole grains, proteins, and low-fat dairy products.  Do light exercise, unless your doctor told you not to exercise.  Use birth control if you do not want to get pregnant. When should you call for help? Call your doctor now or seek immediate medical care if:     You have severe vaginal bleeding.      You have new or worse belly or pelvic pain.    Watch closely for changes in your health, and be sure to contact your doctor if:     You have unusual vaginal bleeding.      You think you might be pregnant.      You do not get better as expected. Where can you learn more? Go to https://cacaoTVdejuan.health"[employer] org and sign in to your [employer] account. Enter 53-69-10-18 in the [facility] box to learn more about \"Secondary Amenorrhea: Care Instructions. \"     If you do not have an account, please click on the \"Sign Up Now\" link. Current as of: November 22, 2021               Content Version: 13.2  © 1645-1571 [employer]. Care instructions adapted under license by [employer]. If you have questions about a medical condition or this instruction, always ask your healthcare professional. [address] any warranty or liability for your use of this information.

## 2022-04-27 NOTE — ASSESSMENT & PLAN NOTE
She has had nausea for some time. I will do blood tests to check for possible celiac disease. Also I will prescribe Zofran for some nausea relief.

## 2022-04-28 RX ORDER — LEVOTHYROXINE SODIUM 112 UG/1
112 TABLET ORAL DAILY
Qty: 90 TABLET | Refills: 1 | Status: SHIPPED | OUTPATIENT
Start: 2022-04-28 | End: 2022-06-01 | Stop reason: SDUPTHER

## 2022-05-02 ENCOUNTER — OFFICE VISIT (OUTPATIENT)
Dept: NEUROLOGY | Age: 27
End: 2022-05-02
Payer: MEDICAID

## 2022-05-02 VITALS
WEIGHT: 276 LBS | BODY MASS INDEX: 43.32 KG/M2 | OXYGEN SATURATION: 98 % | SYSTOLIC BLOOD PRESSURE: 114 MMHG | HEIGHT: 67 IN | DIASTOLIC BLOOD PRESSURE: 70 MMHG | HEART RATE: 84 BPM

## 2022-05-02 DIAGNOSIS — R55 SYNCOPE, UNSPECIFIED SYNCOPE TYPE: Primary | ICD-10-CM

## 2022-05-02 PROCEDURE — 99205 OFFICE O/P NEW HI 60 MIN: CPT | Performed by: PSYCHIATRY & NEUROLOGY

## 2022-05-02 PROCEDURE — G8417 CALC BMI ABV UP PARAM F/U: HCPCS | Performed by: PSYCHIATRY & NEUROLOGY

## 2022-05-02 PROCEDURE — G8427 DOCREV CUR MEDS BY ELIG CLIN: HCPCS | Performed by: PSYCHIATRY & NEUROLOGY

## 2022-05-02 NOTE — PROGRESS NOTES
5/2/22    Kathryn Read  1995    Chief Complaint   Patient presents with    Loss of Consciousness     pt states she has been feeling light head and dizzy since 2020, pt states she had covid in August 2020 which was before these symptoms started, pt states she was told it is not cardio related, pt states she has lost consciousness in the last few months about 3-4 times, pt states there has been a few days where it has happened like 6-7 times       History of Present Illness    Kathryn Read presents in neurologic consultation at our [facility] office for lightheadedness and syncope. She states that she has been having dizziness and lightheadedness since August 2020 after she had COVID-19. She states that she feels lightheaded all the time. Lightheadedness does not seem to be positional as she states that she is lightheaded with sitting, standing, and lying down. Sometimes, though, with slight movements she may have a \"room spinning\" sensation. She states along with the dizziness and lightheadedness she will pass out. There has been an instance where she passed out 5 times in 2 days. Thankfully, she tells me that she has not injured herself or hit her head with passing out. She states she will be out for minutes but there have been occasions where she will be out for up to 3 hours. Her boyfriend has witnessed the spells and states that her eyes will roll in the back of her head and there is some shaking of her extremities. She has not lost control of her bowels or bladder. She has not bitten her tongue. When she wakes up she is groggy and confused but it does not seem like there is a really prolonged period of lethargy or confusion. There is no family history of seizure. She states that her mother has had syncope in the past but she believes this is due to low blood pressure or low blood sugar.       Subjective    Review of Symptoms:  Neurologic   Symptoms: dizziness, vertigo, syncope, no difficulty with gait or walking, no bowel symptoms, no confusion, no memory loss, no speech disorder, no visual loss, no double vision, no loss of hearing, no sensory disturbances, no weakness, no headaches, no bladder symptoms, no seizures and no excessive fatigue    Current Outpatient Medications   Medication Sig Dispense Refill    levothyroxine (SYNTHROID) 112 MCG tablet Take 1 tablet by mouth daily 90 tablet 1    ondansetron (ZOFRAN) 4 MG tablet Take 1 tablet by mouth every 8 hours as needed for Nausea or Vomiting 30 tablet 0    ibuprofen (ADVIL;MOTRIN) 800 MG tablet Take 1 tablet by mouth 3 times daily as needed for Pain 60 tablet 0     No current facility-administered medications for this visit. Past Medical History:   Diagnosis Date    Hypothyroidism 2017       Past Surgical History:   Procedure Laterality Date    WISDOM TOOTH EXTRACTION          Social History     Socioeconomic History    Marital status: Single     Spouse name: None    Number of children: None    Years of education: None    Highest education level: None   Occupational History    None   Tobacco Use    Smoking status: Never Smoker    Smokeless tobacco: Never Used   Vaping Use    Vaping Use: Never used   Substance and Sexual Activity    Alcohol use: Never     Comment: caffeine none    Drug use: Never    Sexual activity: Yes     Partners: Male   Other Topics Concern    None   Social History Narrative    None     Social Determinants of Health     Financial Resource Strain: Low Risk     Difficulty of Paying Living Expenses: Not hard at all   Food Insecurity: No Food Insecurity    Worried About Running Out of Food in the Last Year: Never true    Zach of Food in the Last Year: Never true   Transportation Needs:     Lack of Transportation (Medical): Not on file    Lack of Transportation (Non-Medical):  Not on file   Physical Activity:     Days of Exercise per Week: Not on file    Minutes of Exercise per Session: Not on file Stress:     Feeling of Stress : Not on file   Social Connections:     Frequency of Communication with Friends and Family: Not on file    Frequency of Social Gatherings with Friends and Family: Not on file    Attends Scientology Services: Not on file    Active Member of Clubs or Organizations: Not on file    Attends Club or Organization Meetings: Not on file    Marital Status: Not on file   Intimate Partner Violence:     Fear of Current or Ex-Partner: Not on file    Emotionally Abused: Not on file    Physically Abused: Not on file    Sexually Abused: Not on file   Housing Stability:     Unable to Pay for Housing in the Last Year: Not on file    Number of [place] in the Last Year: Not on file    Unstable Housing in the Last Year: Not on file       Family History   Problem Relation Age of Onset    Depression Mother     Diabetes Mother     Heart Attack Father     Other Father         DVT with PE    High Blood Pressure Sister     Diabetes Maternal Grandmother     Diabetes Maternal Grandfather        Objective    Physical Exam:    Constitutional   Weight: morbidly obese  Heart/Vascular   Rate and Rhythm: RRR   Murmurs: none   Arterial Pulses:  no carotid bruits  Neck   Appearance/Palpation/Auscultation: supple  Mental Status   Orientation: oriented to person, oriented to place, oriented to problem and oriented to time   Mood/Affect: appropriate mood and appropriate affect   Memory/Other: recent memory intact, remote memory intact, fund of knowledge intact, attention span normal and concentration normal  Language   Language: (normal) language, no dysarthria, (normal) articulation and no dysphasia/aphasia  Cranial Nerves   CN II Right: visual fields appear intact   CN II Left: visual fields appear intact   CN III, IV, VI: EOM no nystagmus, normal pursuit and extraocular muscle strength normal   CN III: pupil normal size, pupil reactive to light and dark, pupil accomodates and no ptosis   CN IV: normal   CN VI: normal   CN V Right: normal sensation and muscles of mastication intact   CN V Left: normal sensation and muscles of mastication intact   CN VII Right: normal facial expression   CN VII Left: normal facial expression   CN VIII Right: hearing in tact to normal conversation   CN VIII Left: hearing in tact to normal conversation   CN IX,X: normal palatal movement   CN XI Right: normal sternocleidomastoid and normal trapezius   CN XI Left: normal sternocleidomastoid and normal trapezius   CN XII: no tremors of the tongue, no fasciculation of the tongue, tongue protrudes midline, normal power to left and normal power to right  Gait and Stance   Gait/Posture: station normal, ambulates independently, gait normal and Romberg's test normal  Motor/Coordination Exam   General: no bradykinesia, no tremors, no chorea, no athetosis, no myoclonus and no dyskinesia   Right Upper Extremity: normal motor strength, normal bulk and normal tone   Left Upper Extremity: normal motor strength, normal bulk and normal tone   Right Lower Extremity: normal motor strength, normal bulk and normal tone   Left Lower Extremity: normal motor strength, normal bulk and normal tone   Coordination: no drift, normal finger-to-nose and rapid alternating movements normal  Reflexes   Reflexes Right: DTRS are normal throughout   Reflexes Left: DTRS are normal throughout  Sensory   Sensation: normal light touch, normal pinprick, normal vibration, normal DSS and no neglect  Spine   Cervical Spine: no tenderness, no dystonia  and full ROM   Thoracic Spine: no spasms, no bony abnormalities, normal curvature, no tenderness and full ROM   Low Back: full ROM, no pain, no spasms and no bony abnormalities  Lungs   Auscultation: normal breath sounds  Skin   Inspection: no jaundice, no lesions, no rashes and no cyanosis      /70 (Site: Left Upper Arm, Position: Sitting, Cuff Size: Large Adult)   Pulse 84   Ht 5' 7\" (1.702 m)   Wt 276 lb (125.2 kg)   SpO2 98%   BMI 43.23 kg/m²     Assessment and Plan     Diagnosis Orders   1. Syncope, unspecified syncope type  MRI BRAIN WO CONTRAST    EEG    EEG Aime Mercer has been experiencing lightheadedness, dizziness, and syncope that has eluded a specific diagnosis. She has had a thorough cardiac work-up that was not fruitful which included cardiac monitoring, echocardiogram, and a stress test.  She admits to these problems arising after her COVID-19 illness in August 2020. I will arrange for an MRI of the brain to assess for any structural abnormality that may precipitate chronic dizziness. I will obtain an EEG with subsequent ambulatory EEG to assess for any cortical irritability and a propensity for seizures as seizures is certainly in the differential diagnosis especially with her prolonged syncopal states and some witnessed shaking with her spells. Return in about 3 months (around 8/2/2022) for Follow-up PA/NP.     Justine Johnson, DO

## 2022-05-09 ENCOUNTER — TELEPHONE (OUTPATIENT)
Dept: FAMILY MEDICINE CLINIC | Age: 27
End: 2022-05-09

## 2022-05-09 NOTE — TELEPHONE ENCOUNTER
Patient states she is feeling lightheaded and dizzy. She has passed out 4x today since 4:00 this morning. She wanted to know if a work excuse can be given for today.   She has her sleep study on Wednesday

## 2022-05-09 NOTE — LETTER
[address]  [address] [place] [address]  Phone: [phone]  Fax: [phone]    Alok Steel MD        May 9, 2022     Patient: Parviz Read   YOB: 1995   Date of Visit: 5/9/2022       To Whom it May Concern:    Kathryn Read had a flare of her medical condition. She may return to work on 5/10/2022. If you have any questions or concerns, please don't hesitate to call.     Sincerely,         Alok Steel MD

## 2022-05-11 ENCOUNTER — HOSPITAL ENCOUNTER (OUTPATIENT)
Dept: SLEEP CENTER | Age: 27
Discharge: HOME | End: 2022-05-11
Payer: MEDICAID

## 2022-05-11 ENCOUNTER — HOSPITAL ENCOUNTER (OUTPATIENT)
Dept: NEUROLOGY | Age: 27
Discharge: HOME | End: 2022-05-11
Payer: MEDICAID

## 2022-05-11 DIAGNOSIS — R55 SYNCOPE, UNSPECIFIED SYNCOPE TYPE: ICD-10-CM

## 2022-05-11 DIAGNOSIS — G47.33 OSA (OBSTRUCTIVE SLEEP APNEA): Primary | ICD-10-CM

## 2022-05-11 PROCEDURE — 95819 EEG AWAKE AND ASLEEP: CPT

## 2022-05-11 PROCEDURE — 95810 POLYSOM 6/> YRS 4/> PARAM: CPT

## 2022-05-11 PROCEDURE — 95816 EEG AWAKE AND DROWSY: CPT | Performed by: STUDENT IN AN ORGANIZED HEALTH CARE EDUCATION/TRAINING PROGRAM

## 2022-05-11 NOTE — PROGRESS NOTES
EEG with Photic stimulation completed. Physician notified via [employer].  SUNNY Kent

## 2022-05-12 LAB — STATUS: NORMAL

## 2022-05-12 PROCEDURE — 95810 POLYSOM 6/> YRS 4/> PARAM: CPT | Performed by: INTERNAL MEDICINE

## 2022-05-12 NOTE — PROGRESS NOTES
5/12/2022  sleep study  for Kathryn Cost  1995 is complete. Results are pending physician review.     Electronically signed by Indra Brandon on 5/12/2022 at 3:54 AM

## 2022-05-16 ENCOUNTER — TELEPHONE (OUTPATIENT)
Dept: FAMILY MEDICINE CLINIC | Age: 27
End: 2022-05-16

## 2022-05-16 NOTE — LETTER
[address]  [address] [place] [address]  Phone: [phone]  Fax: [phone]    Michel Murillo MD        May 16, 2022     Patient: Ayaka Phillips   YOB: 1995   Date of Visit: 5/16/2022       To Whom It May Concern: It is my medical opinion that Ayaka Phillips should reduce work hours to 40 hours per week until 6/16/2022. If you have any questions or concerns, please don't hesitate to call.     Sincerely,        Michel Murillo MD

## 2022-05-16 NOTE — TELEPHONE ENCOUNTER
I am printing a letter requesting that her work be limited to 40 hours/week for the next month. She should schedule a follow-up later in the month so we can discuss how she is doing. Note that I am happy to say 40 hours/week but I do not want to exclude weekends in my note.

## 2022-05-16 NOTE — TELEPHONE ENCOUNTER
Patient had multiple episodes of passing out over the weekend- Patient attributes the increased episodes to working 12 hour shifts with mandatory overtime- Patient requesting note excusing her from work over the weekend and an additional note restricting her hours to 40 hour work week until episodes can be resolved/treated.  Please advise

## 2022-05-17 ENCOUNTER — HOSPITAL ENCOUNTER (OUTPATIENT)
Dept: MRI IMAGING | Age: 27
Discharge: HOME | End: 2022-05-17
Payer: MEDICAID

## 2022-05-17 ENCOUNTER — HOSPITAL ENCOUNTER (OUTPATIENT)
Age: 27
Discharge: HOME | End: 2022-05-17
Payer: MEDICAID

## 2022-05-17 DIAGNOSIS — R55 SYNCOPE, UNSPECIFIED SYNCOPE TYPE: ICD-10-CM

## 2022-05-17 DIAGNOSIS — Z34.90 PREGNANCY, UNSPECIFIED GESTATIONAL AGE: Primary | ICD-10-CM

## 2022-05-17 LAB
ALBUMIN SERPL-MCNC: 4.4 GM/DL (ref 3.4–5)
ALP BLD-CCNC: 72 IU/L (ref 40–129)
ALT SERPL-CCNC: 10 U/L (ref 10–40)
ANION GAP SERPL CALCULATED.3IONS-SCNC: 11 MMOL/L (ref 4–16)
AST SERPL-CCNC: 12 IU/L (ref 15–37)
BILIRUB SERPL-MCNC: 0.2 MG/DL (ref 0–1)
BUN BLDV-MCNC: 7 MG/DL (ref 6–23)
CALCIUM SERPL-MCNC: 9.3 MG/DL (ref 8.3–10.6)
CHLORIDE BLD-SCNC: 101 MMOL/L (ref 99–110)
CO2: 21 MMOL/L (ref 21–32)
CREAT SERPL-MCNC: 0.7 MG/DL (ref 0.6–1.1)
FOLLICLE STIMULATING HORMONE: 0.1 MLU/ML
GFR AFRICAN AMERICAN: >60 ML/MIN/1.73M2
GFR NON-AFRICAN AMERICAN: >60 ML/MIN/1.73M2
GLUCOSE BLD-MCNC: 102 MG/DL (ref 70–99)
HCG QUALITATIVE: POSITIVE
POTASSIUM SERPL-SCNC: 4.2 MMOL/L (ref 3.5–5.1)
PROLACTIN: 42.9 NG/ML
SODIUM BLD-SCNC: 133 MMOL/L (ref 135–145)
TOTAL PROTEIN: 6.7 GM/DL (ref 6.4–8.2)

## 2022-05-17 PROCEDURE — 70551 MRI BRAIN STEM W/O DYE: CPT

## 2022-05-17 PROCEDURE — 36415 COLL VENOUS BLD VENIPUNCTURE: CPT

## 2022-05-17 PROCEDURE — 83516 IMMUNOASSAY NONANTIBODY: CPT

## 2022-05-17 PROCEDURE — 84146 ASSAY OF PROLACTIN: CPT

## 2022-05-17 PROCEDURE — 84703 CHORIONIC GONADOTROPIN ASSAY: CPT

## 2022-05-17 PROCEDURE — 83001 ASSAY OF GONADOTROPIN (FSH): CPT

## 2022-05-17 PROCEDURE — 80053 COMPREHEN METABOLIC PANEL: CPT

## 2022-05-18 ENCOUNTER — TELEPHONE (OUTPATIENT)
Dept: FAMILY MEDICINE CLINIC | Age: 27
End: 2022-05-18

## 2022-05-18 NOTE — LETTER
[address]  [address] [facility] [address]  Phone: [phone]  Fax: [phone]    Thierno Elliott MD        May 18, 2022     Patient: Cisco Read   YOB: 1995   Date of Visit: 5/18/2022       To Whom it May Concern:    Kathryn Read was seen in my clinic. She may return to work on 5/19/2022. Please excuse 5/14/22 through 5/18/2022. If you have any questions or concerns, please don't hesitate to call.     Sincerely,         Thierno Elliott MD

## 2022-05-18 NOTE — TELEPHONE ENCOUNTER
The letter was printed and prepared for her. Also note that yesterday a blood test came back positive for pregnancy and I spoke with her. I cautioned her that if she had symptoms suggesting a possible ectopic pregnancy should go to the ER.   Of note though is that her passing out has been happening for several months prior to this positive pregnancy test.

## 2022-05-18 NOTE — TELEPHONE ENCOUNTER
I printed a work excuse for May 14 to 25. Regarding notes for short-term visit disability, she should get the forms that would be needed from her HR department and schedule an appointment and bring the forms with her. At the beginning of the appointment she should tell us that the chief complaint for that visit is to get the forms filled out and give us the forms. I am happy to answer all the questions on the forms truthfully but I do not know what the criteria are for getting short-term disability approved.

## 2022-05-18 NOTE — TELEPHONE ENCOUNTER
Patient passed out at work again over the weekend and today. She is asking for a work excuse letter or May 14th, 15th, and 18th. Also is wanting to know if you are able to write her a letter for short term disability so she can be off work until they find out why she is having these episodes. She is unable to apply for short term disability through work because she has not worked there long enough.

## 2022-05-19 ENCOUNTER — TELEPHONE (OUTPATIENT)
Dept: FAMILY MEDICINE CLINIC | Age: 27
End: 2022-05-19

## 2022-05-19 DIAGNOSIS — Z34.90 PREGNANCY, UNSPECIFIED GESTATIONAL AGE: Primary | ICD-10-CM

## 2022-05-19 LAB — PAP SMEAR, EXTERNAL: NORMAL

## 2022-05-19 RX ORDER — .ALPHA.-TOCOPHEROL ACETATE, DL-, ASCORBIC ACID, CHOLECALCIFEROL, CYANOCOBALAMIN, FOLIC ACID, FERROUS FUMARATE, CALCIUM PHOSPHATE, DIBASIC, ANHYDROUS, NIACINAMIDE, PYRIDOXINE HYDROCHLORIDE, RIBOFLAVIN, THIAMINE MONONITRATE, AND VITAMIN A ACETATE 15; 60; 400; 4.5; 1; 27; 50; 13.5; 1.05; 1.2; 1.05; 25 [IU]/1; MG/1; [IU]/1; UG/1; MG/1; MG/1; MG/1; MG/1; MG/1; MG/1; MG/1; [IU]/1
1 TABLET ORAL DAILY
Qty: 30 TABLET | Refills: 11 | Status: SHIPPED | OUTPATIENT
Start: 2022-05-19

## 2022-05-19 RX ORDER — PROMETHAZINE HYDROCHLORIDE 25 MG/1
25 TABLET ORAL EVERY 6 HOURS PRN
Qty: 30 TABLET | Refills: 0 | Status: SHIPPED | OUTPATIENT
Start: 2022-05-19

## 2022-05-19 NOTE — TELEPHONE ENCOUNTER
Patient called requesting test results- Patient has positive pregnancy test with LMP date between 02/10/2022 and 02/25/2022. Patient has been experiencing intense morning sickness and spells of dizziness. Per provider patient told to continue levothyroxine but to stop Zofran immediately. Patient request something for morning sickness that is safe in pregnancy as well as a prenatal vitamin/ any additional vitamins that may be recommended. Patient plans to see OB/GYN asap.  Please advise

## 2022-05-20 LAB — IMMUNOGLOBULIN A, SERUM: 5 UNITS (ref 0–19)

## 2022-05-23 ENCOUNTER — TELEPHONE (OUTPATIENT)
Dept: FAMILY MEDICINE CLINIC | Age: 27
End: 2022-05-23

## 2022-05-23 NOTE — TELEPHONE ENCOUNTER
Patient is pregnant. Patient asking is it okay to still continue to take levothyroxine 112mcg.  Patient was put on prenatal vitamins Dyspnea on exertion

## 2022-05-24 NOTE — TELEPHONE ENCOUNTER
She should keep taking the levothyroxine. Later this month or early next month she is due to get TSH and T4 blood tests. The order is already in her chart.

## 2022-05-26 ENCOUNTER — TELEPHONE (OUTPATIENT)
Dept: FAMILY MEDICINE CLINIC | Age: 27
End: 2022-05-26

## 2022-05-26 NOTE — TELEPHONE ENCOUNTER
Patient called into the office to inform you that she is continuing to pass out and has had 5 episodes today

## 2022-05-27 NOTE — TELEPHONE ENCOUNTER
I am going to recommend that she goes to the emergency department since she was recently diagnosed with pregnancy and I just want to be sure that there is not a problem with something like an ectopic pregnancy.

## 2022-05-31 ENCOUNTER — OFFICE VISIT (OUTPATIENT)
Dept: FAMILY MEDICINE CLINIC | Age: 27
End: 2022-05-31
Payer: MEDICAID

## 2022-05-31 VITALS
DIASTOLIC BLOOD PRESSURE: 70 MMHG | HEIGHT: 67 IN | BODY MASS INDEX: 42.03 KG/M2 | SYSTOLIC BLOOD PRESSURE: 112 MMHG | HEART RATE: 89 BPM | OXYGEN SATURATION: 99 % | TEMPERATURE: 97.9 F | WEIGHT: 267.8 LBS

## 2022-05-31 DIAGNOSIS — O03.9 MISCARRIAGE: Primary | ICD-10-CM

## 2022-05-31 DIAGNOSIS — E66.01 MORBID OBESITY (HCC): ICD-10-CM

## 2022-05-31 DIAGNOSIS — G47.10 HYPERSOMNIA: ICD-10-CM

## 2022-05-31 DIAGNOSIS — E03.9 HYPOTHYROIDISM, UNSPECIFIED TYPE: ICD-10-CM

## 2022-05-31 DIAGNOSIS — E03.9 ACQUIRED HYPOTHYROIDISM: ICD-10-CM

## 2022-05-31 DIAGNOSIS — R55 SYNCOPE, UNSPECIFIED SYNCOPE TYPE: ICD-10-CM

## 2022-05-31 DIAGNOSIS — N30.00 ACUTE CYSTITIS WITHOUT HEMATURIA: ICD-10-CM

## 2022-05-31 LAB
A/G RATIO: 1.7 (ref 1.1–2.2)
ALBUMIN SERPL-MCNC: 4.4 G/DL (ref 3.4–5)
ALP BLD-CCNC: 92 U/L (ref 40–129)
ALT SERPL-CCNC: 11 U/L (ref 10–40)
ANION GAP SERPL CALCULATED.3IONS-SCNC: 17 MMOL/L (ref 3–16)
AST SERPL-CCNC: 14 U/L (ref 15–37)
BASOPHILS ABSOLUTE: 0 K/UL (ref 0–0.2)
BASOPHILS RELATIVE PERCENT: 0.8 %
BILIRUB SERPL-MCNC: <0.2 MG/DL (ref 0–1)
BUN BLDV-MCNC: 8 MG/DL (ref 7–20)
CALCIUM SERPL-MCNC: 9.5 MG/DL (ref 8.3–10.6)
CHLORIDE BLD-SCNC: 103 MMOL/L (ref 99–110)
CO2: 18 MMOL/L (ref 21–32)
CREAT SERPL-MCNC: 0.7 MG/DL (ref 0.6–1.1)
EOSINOPHILS ABSOLUTE: 0.1 K/UL (ref 0–0.6)
EOSINOPHILS RELATIVE PERCENT: 2.2 %
GFR AFRICAN AMERICAN: >60
GFR NON-AFRICAN AMERICAN: >60
GLUCOSE BLD-MCNC: 104 MG/DL (ref 70–99)
GONADOTROPIN, CHORIONIC (HCG) QUANT: 138.2 MIU/ML
HCT VFR BLD CALC: 37.8 % (ref 36–48)
HEMOGLOBIN: 12.7 G/DL (ref 12–16)
LYMPHOCYTES ABSOLUTE: 0.9 K/UL (ref 1–5.1)
LYMPHOCYTES RELATIVE PERCENT: 17.4 %
MCH RBC QN AUTO: 30.4 PG (ref 26–34)
MCHC RBC AUTO-ENTMCNC: 33.5 G/DL (ref 31–36)
MCV RBC AUTO: 90.7 FL (ref 80–100)
MONOCYTES ABSOLUTE: 0.5 K/UL (ref 0–1.3)
MONOCYTES RELATIVE PERCENT: 10.2 %
NEUTROPHILS ABSOLUTE: 3.6 K/UL (ref 1.7–7.7)
NEUTROPHILS RELATIVE PERCENT: 69.4 %
PDW BLD-RTO: 14.3 % (ref 12.4–15.4)
PLATELET # BLD: 317 K/UL (ref 135–450)
PMV BLD AUTO: 7.6 FL (ref 5–10.5)
POTASSIUM SERPL-SCNC: 4.4 MMOL/L (ref 3.5–5.1)
RBC # BLD: 4.17 M/UL (ref 4–5.2)
SODIUM BLD-SCNC: 138 MMOL/L (ref 136–145)
T4 FREE: 1 NG/DL (ref 0.9–1.8)
TOTAL PROTEIN: 7 G/DL (ref 6.4–8.2)
TSH REFLEX FT4: 5.62 UIU/ML (ref 0.27–4.2)
WBC # BLD: 5.2 K/UL (ref 4–11)

## 2022-05-31 PROCEDURE — 99214 OFFICE O/P EST MOD 30 MIN: CPT | Performed by: FAMILY MEDICINE

## 2022-05-31 PROCEDURE — G8427 DOCREV CUR MEDS BY ELIG CLIN: HCPCS | Performed by: FAMILY MEDICINE

## 2022-05-31 PROCEDURE — 1036F TOBACCO NON-USER: CPT | Performed by: FAMILY MEDICINE

## 2022-05-31 PROCEDURE — 36415 COLL VENOUS BLD VENIPUNCTURE: CPT | Performed by: FAMILY MEDICINE

## 2022-05-31 PROCEDURE — G8417 CALC BMI ABV UP PARAM F/U: HCPCS | Performed by: FAMILY MEDICINE

## 2022-05-31 ASSESSMENT — PATIENT HEALTH QUESTIONNAIRE - PHQ9
1. LITTLE INTEREST OR PLEASURE IN DOING THINGS: 0
SUM OF ALL RESPONSES TO PHQ QUESTIONS 1-9: 0
SUM OF ALL RESPONSES TO PHQ9 QUESTIONS 1 & 2: 0
2. FEELING DOWN, DEPRESSED OR HOPELESS: 0

## 2022-05-31 ASSESSMENT — ENCOUNTER SYMPTOMS
DIARRHEA: 0
NAUSEA: 1
SHORTNESS OF BREATH: 1
VOMITING: 0
COUGH: 1

## 2022-05-31 NOTE — PROGRESS NOTES
5/31/22    Kathryn Cost  1995    Tammy Brown is a 27 y.o. female who presents today for evaluation of:  Chief Complaint   Patient presents with    1 Month Follow-Up     nausea, syncope     Syncope : Passing out x 2 yrs. Sometimes gets a warning like spinning room. Has happened having conversation in car. Has not collapsed to floor. Started Oct 2020. Boyfriend has caught her from falling. Episodes have happened away from boyfriend and she has not gotten hurt from fall. He has found her asleep from the fall. Sleep study was done and she is waiting for results. She had  vaginal bleeding and went to ER. Ultrasound showed no heartbeat so they think she is having a miscarriage. Still is having some bleeding. She was also diagnosed with UTI and bacteria. Hypothyroidism : she is due for tyroid tests. Review of Systems   Constitutional: Negative for fatigue and fever. Respiratory: Positive for cough and shortness of breath. Cardiovascular: Positive for chest pain. Gastrointestinal: Positive for nausea. Negative for diarrhea and vomiting. Neurological: Positive for dizziness, syncope, light-headedness (associated with the passing out.) and headaches. Negative for tremors, seizures, speech difficulty, weakness and numbness. Psychiatric/Behavioral: Negative for dysphoric mood, self-injury and suicidal ideas. The patient is not nervous/anxious. No Known Allergies     OBJECTIVE    /70 (Site: Right Upper Arm, Position: Sitting, Cuff Size: Large Adult)   Pulse 89   Temp 97.9 °F (36.6 °C) (Infrared)   Ht 5' 7\" (1.702 m)   Wt 267 lb 12.8 oz (121.5 kg)   SpO2 99%   BMI 41.94 kg/m²     Physical Exam   Constitutional:       General: Not in acute distress. Appearance: Normal appearance. Not ill-appearing. Very obese. Eyes:      General: No scleral icterus. Neck:      Thyroid: No thyroid mass, thyromegaly or thyroid tenderness.    Lymphadenopathy:      Cervical: Right cervical: No superficial cervical adenopathy. Left cervical: No superficial cervical adenopathy. Cardiovascular:      Rate and Rhythm: Normal rate and regular rhythm. Heart sounds: No murmur heard. No friction rub. No gallop. Pulmonary:      Effort: Pulmonary effort is normal. No respiratory distress. Breath sounds: No wheezing, rhonchi or rales. Abdominal:      Palpations: Abdomen is soft. There is no mass. Tenderness: There is no abdominal tenderness. Musculoskeletal:     Moves all extremities normally. Skin:     General: Skin is warm. Coloration: Skin is not jaundiced. Neurological:      Mental Status: Patient is alert. Psychiatric:         Behavior: Behavior normal.         Thought Content: Thought content normal.         Judgment: Judgment normal.    Reviewed:   Tried to review Care Everywhere note part of which is dated 5/29/22 that mentions HCG of 777 and nitrofurantoin and urine WBC and protein. (Formatting of note makes it impossible to get necessary information in a timely manner.)      ASSESSMENT/PLAN:    1. Miscarriage  Assessment & Plan:   I will check a quantitative hCG to confirm miscarriage. She is aware that if she has a large amount of bleeding that she should go to the emergency department. Orders:  -     CBC with Auto Differential  -     HCG, QUANTITATIVE, PREGNANCY  2. Morbid obesity ([address]  Assessment & Plan:   She is exercising some and I encouraged that. I suggested time restricted eating. 3. Acquired hypothyroidism  Assessment & Plan:   I will check a T4 and a TSH since she is due for thyroid testing. Orders:  -     T4, Free  -     TSH with Reflex to FT4  4. Hypersomnia  Assessment & Plan:   She has a pending review with the sleep medicine clinic to go over her sleep study results. There is a moderate chance that her passing out spells could be due to obstructive sleep apnea.   5. Syncope, unspecified syncope type  Assessment & Plan: Possibly due to obstructive sleep apnea. I do not think the passing out has anything to do with her miscarriage. The episodes of falling asleep or waking up after having been passed out have been going on for 18 months or more. She has not had any significant injury which is reassuring. She has had a cardiology work-up which has been negative so far. Orders:  -     Comprehensive Metabolic Panel  6. Acute cystitis without hematuria    I tried to give her ideas about how to prevent urinary tract infections. Empty urine completely, drink plenty water, urinate after intercourse. Counseling provided for:  Healthy eating - Avoid sugar and other refined carbohydrates. , Eat more foods with fiber like vegetables and whole grain products. and Time restricted eating: Only eat between certain hours each day. For example, Only eat if it is between 7am and 8am, between 12noon and 1pm, or between 6pm and 7pm.  >> Exercise - 1> try to do 150 minutes a week of exercise that is as hard as walking briskly (30 minutes 5 days a week or 22 minutes every day); and 2> do some strength training 2 or 3 times a week.       Amy Quesada MD

## 2022-05-31 NOTE — ASSESSMENT & PLAN NOTE
She has a pending review with the sleep medicine clinic to go over her sleep study results. There is a moderate chance that her passing out spells could be due to obstructive sleep apnea.

## 2022-05-31 NOTE — ASSESSMENT & PLAN NOTE
Possibly due to obstructive sleep apnea. I do not think the passing out has anything to do with her miscarriage. The episodes of falling asleep or waking up after having been passed out have been going on for 18 months or more. She has not had any significant injury which is reassuring. She has had a cardiology work-up which has been negative so far.

## 2022-05-31 NOTE — ASSESSMENT & PLAN NOTE
I will check a quantitative hCG to confirm miscarriage. She is aware that if she has a large amount of bleeding that she should go to the emergency department.

## 2022-05-31 NOTE — PATIENT INSTRUCTIONS
Patient Education        Hypothyroidism: Care Instructions  Your Care Instructions     When you have hypothyroidism, your body doesn't make enough thyroid hormone. This hormone helps your body use energy. If your thyroid level is low, you may feel tired, be constipated, have an increase in your blood pressure, or have dry skin or memory problems. You may also get cold easily, even when it iswarm. Women with low thyroid levels may have heavy menstrual periods. A blood test to find your thyroid-stimulating hormone (TSH) level is used tocheck for hypothyroidism. A high TSH level may mean that you have it. The treatment for hypothyroidism is thyroid hormone pills. You should start to feel better in 1 to 2 weeks. Most people need treatment for the rest of their lives. You will need regular visits with your doctor to make sure you are doingwell and that you have the right dose of medicine. Follow-up care is a key part of your treatment and safety. Be sure to make and go to all appointments, and call your doctor if you are having problems. It's also a good idea to know your test results and keep alist of the medicines you take. How can you care for yourself at home?  Take your thyroid hormone medicine exactly as prescribed. Call your doctor if you think you are having a problem with your medicine. Most people do not have side effects if they take the right amount of medicine regularly. ? Take the medicine 30 minutes before breakfast, and do not take it with calcium, vitamins, or iron. ? Do not take extra doses of your thyroid medicine. It will not help you get better any faster, and it may cause side effects. ? If you forget to take a dose, do NOT take a double dose of medicine. Take your usual dose the next day.  Tell your doctor about all prescription, herbal, or over-the-counter products you take.  Take care of yourself. Eat a healthy diet, get enough sleep, and get regular exercise.   When should you call for help? Call 911 anytime you think you may need emergency care. For example, call if:     You passed out (lost consciousness).      You have severe trouble breathing.      You have a very slow heartbeat (less than 60 beats a minute).      You have a low body temperature (95°F or below). Call your doctor now or seek immediate medical care if:     You feel tired, sluggish, or weak.      You have trouble remembering things or concentrating.      You do not begin to feel better 2 weeks after starting your medicine. Watch closely for changes in your health, and be sure to contact your doctor ifyou have any problems. Where can you learn more? Go to [email or web address].[email or web address]. org and sign in to your [employer] account. Enter O749 in the [employer] box to learn more about \"Hypothyroidism: Care Instructions. \"     If you do not have an account, please click on the \"Sign Up Now\" link. Current as of: July 28, 2021               Content Version: 13.2  © 2006-2022 Healthwise, Incorporated. Care instructions adapted under license by [facility] ([facility]). If you have questions about a medical condition or this instruction, always ask your healthcare professional. [address] any warranty or liability for your use of this information.

## 2022-06-01 RX ORDER — LEVOTHYROXINE SODIUM 0.12 MG/1
125 TABLET ORAL DAILY
Qty: 90 TABLET | Refills: 0 | Status: SHIPPED | OUTPATIENT
Start: 2022-06-01 | End: 2022-08-15 | Stop reason: SDUPTHER

## 2022-06-13 ENCOUNTER — TELEPHONE (OUTPATIENT)
Dept: FAMILY MEDICINE CLINIC | Age: 27
End: 2022-06-13

## 2022-06-13 NOTE — TELEPHONE ENCOUNTER
Patient cannot get FMLA due to not being there long enough and she needs a note so she can go back tomorrow.

## 2022-06-13 NOTE — TELEPHONE ENCOUNTER
Patient passed out again 3 times this morning. Dr. Vincenzo Salmon has been writing a letter for her every time this happens for her work to excuse her. Are you able to do this?

## 2022-06-14 ENCOUNTER — TELEPHONE (OUTPATIENT)
Dept: FAMILY MEDICINE CLINIC | Age: 27
End: 2022-06-14

## 2022-06-14 NOTE — TELEPHONE ENCOUNTER
----- Message from Aby Bey Day sent at 6/14/2022 10:01 AM EDT -----  Subject: Message to Provider    QUESTIONS  Information for Provider? pt would like a work excuse 06- she had   another episode of fainting today and called off work, please email the   Luis Fernando flores to [email or web address]  ---------------------------------------------------------------------------  --------------  CALL BACK INFO  What is the best way for the office to contact you? OK to leave message on   voicemail  Preferred Call Back Phone Number? [phone]  ---------------------------------------------------------------------------  --------------  SCRIPT ANSWERS  Relationship to Patient?  Self

## 2022-06-24 ENCOUNTER — TELEPHONE (OUTPATIENT)
Dept: FAMILY MEDICINE CLINIC | Age: 27
End: 2022-06-24

## 2022-06-24 NOTE — TELEPHONE ENCOUNTER
Patient called stated she has sun poisoning and wants to know what to put on it. Her symptoms are a dark red rash with white blisters.   Please advise

## 2022-06-24 NOTE — TELEPHONE ENCOUNTER
I did recommend any type of skin moisturizing cream.  I would avoid lotions. Generally skin creams that come in a jar or tube tends to be better than those that come in a pump bottle. She could use hydrocortisone cream to reduce inflammation slightly.

## 2022-06-27 ENCOUNTER — TELEPHONE (OUTPATIENT)
Dept: FAMILY MEDICINE CLINIC | Age: 27
End: 2022-06-27

## 2022-06-27 NOTE — TELEPHONE ENCOUNTER
If something happens that she thinks is dangerous she should go to the emergency department. She should also schedule her next appointment with neurology.

## 2022-06-30 ENCOUNTER — HOSPITAL ENCOUNTER (OUTPATIENT)
Dept: SLEEP CENTER | Age: 27
Discharge: HOME | End: 2022-06-30
Payer: MEDICAID

## 2022-06-30 DIAGNOSIS — G47.10 HYPERSOMNIA: ICD-10-CM

## 2022-06-30 DIAGNOSIS — G47.33 OSA (OBSTRUCTIVE SLEEP APNEA): ICD-10-CM

## 2022-06-30 DIAGNOSIS — F51.02 SLEEP STATE MISPERCEPTION: ICD-10-CM

## 2022-06-30 DIAGNOSIS — E66.01 MORBID OBESITY (HCC): ICD-10-CM

## 2022-06-30 PROCEDURE — 99214 OFFICE O/P EST MOD 30 MIN: CPT | Performed by: INTERNAL MEDICINE

## 2022-06-30 PROCEDURE — 9990000010 HC NO CHARGE VISIT

## 2022-06-30 ASSESSMENT — ENCOUNTER SYMPTOMS
SHORTNESS OF BREATH: 0
BACK PAIN: 0
EYE ITCHING: 0
COUGH: 0
ABDOMINAL PAIN: 0
EYE DISCHARGE: 0
ABDOMINAL DISTENTION: 0

## 2022-06-30 NOTE — PROGRESS NOTES
Kathryn Cost  1995  Referring Provider: Yue Bell MD    Subjective:     Chief Complaint   Patient presents with    Sleep Apnea       HPI  Deo Gan is a 32 y.o. female is doing a telephone follow up visit. She had a PSG done on 05/11/22 and it showed that she has no JENNIFER with desat to 90%and her sleep efficiency is 58.9%. She has no loss of weight. She is not tired during the day time. Current Outpatient Medications   Medication Sig Dispense Refill    levothyroxine (SYNTHROID) 125 MCG tablet Take 1 tablet by mouth daily 90 tablet 0    promethazine (PHENERGAN) 25 MG tablet Take 1 tablet by mouth every 6 hours as needed for Nausea 30 tablet 0    Prenatal Vit-Fe Fumarate-FA (PNV FOLIC ACID + IRON) 28-0 MG TABS Take 1 tablet by mouth daily 30 tablet 11    ibuprofen (ADVIL;MOTRIN) 800 MG tablet Take 1 tablet by mouth 3 times daily as needed for Pain 60 tablet 0    ondansetron (ZOFRAN) 4 MG tablet Take 1 tablet by mouth every 8 hours as needed for Nausea or Vomiting (Patient not taking: Reported on 6/30/2022) 30 tablet 0     No current facility-administered medications for this encounter.        No Known Allergies    Past Medical History:   Diagnosis Date    Hypothyroidism 2017       Past Surgical History:   Procedure Laterality Date    WISDOM TOOTH EXTRACTION         Social History     Socioeconomic History    Marital status: Single     Spouse name: Not on file    Number of children: Not on file    Years of education: Not on file    Highest education level: Not on file   Occupational History    Not on file   Tobacco Use    Smoking status: Never Smoker    Smokeless tobacco: Never Used   Vaping Use    Vaping Use: Never used   Substance and Sexual Activity    Alcohol use: Never     Comment: caffeine none    Drug use: Never    Sexual activity: Yes     Partners: Male   Other Topics Concern    Not on file   Social History Narrative    Not on file     Social Determinants of Health     Financial Resource Strain: Low Risk     Difficulty of Paying Living Expenses: Not hard at all   Food Insecurity: No Food Insecurity    Worried About Running Out of Food in the Last Year: Never true    Zach of Food in the Last Year: Never true   Transportation Needs:     Lack of Transportation (Medical): Not on file    Lack of Transportation (Non-Medical): Not on file   Physical Activity:     Days of Exercise per Week: Not on file    Minutes of Exercise per Session: Not on file   Stress:     Feeling of Stress : Not on file   Social Connections:     Frequency of Communication with Friends and Family: Not on file    Frequency of Social Gatherings with Friends and Family: Not on file    Attends Jain Services: Not on file    Active Member of [address] [place] [address] [employer] or Organizations: Not on file    Attends Club or Organization Meetings: Not on file    Marital Status: Not on file   Intimate Partner Violence:     Fear of Current or Ex-Partner: Not on file    Emotionally Abused: Not on file    Physically Abused: Not on file    Sexually Abused: Not on file   Housing Stability:     Unable to Pay for Housing in the Last Year: Not on file    Number of [place] in the Last Year: Not on file    Unstable Housing in the Last Year: Not on file       Review of Systems   Constitutional: Negative for fatigue. HENT: Negative for congestion and postnasal drip. Eyes: Negative for discharge and itching. Respiratory: Negative for cough and shortness of breath. Cardiovascular: Negative for chest pain and leg swelling. Gastrointestinal: Negative for abdominal distention and abdominal pain. Endocrine: Negative for cold intolerance and heat intolerance. Genitourinary: Negative for enuresis and frequency. Musculoskeletal: Negative for arthralgias and back pain. Allergic/Immunologic: Negative for environmental allergies and food allergies. Neurological: Negative for light-headedness and headaches.    Hematological: Negative for adenopathy. Psychiatric/Behavioral: Negative for agitation and behavioral problems. Objective: There were no vitals taken for this visit. There is no height or weight on file to calculate BMI. Sleep Medicine 3/31/2022   Sitting and reading 0   Watching TV 1   Sitting, inactive in a public place (e.g. a theatre or a meeting) 0   As a passenger in a car for an hour without a break 0   Lying down to rest in the afternoon when circumstances permit 0   Sitting and talking to someone 0   Sitting quietly after a lunch without alcohol 0   In a car, while stopped for a few minutes in traffic 0   Total score 1   Neck circumference (Inches) 16.25       Radiology: none    Assessment and Plan     Problem List        Respiratory    JENNIFER (obstructive sleep apnea)       She has no JENNIFER on the PSG  Her EDS has improved  Loose weight            Other    Morbid obesity ([address]      Advised to loose weight with diet and exercise           Sleep state misperception      She has no JENNIFER  She has mildly decreased sleep efficiency  Loose weight         Hypersomnia      Her EDS has improved  Loose weight                    No follow-ups on file. Follow-Up:    No follow-ups on file. Progress notes sent to the referring Provider    Michael Rod is a 32 y.o. female being evaluated by a Virtual Visit (video visit) encounter to address concerns as mentioned above. A caregiver was present when appropriate. Due to this being a TeleHealth encounter (During [facility]- public health emergency), evaluation of the following organ systems was limited: Vitals/Constitutional/EENT/Resp/CV/GI//MS/Neuro/Skin/Heme-Lymph-Imm.   Pursuant to the emergency declaration under the [address] [place] [address], [address] [place] [address] authority and the [employer] and [employer] Act, this Virtual Visit was conducted with patient's (and/or legal guardian's) consent, to reduce the patient's risk of

## 2022-07-12 ENCOUNTER — OFFICE VISIT (OUTPATIENT)
Dept: FAMILY MEDICINE CLINIC | Age: 27
End: 2022-07-12
Payer: MEDICAID

## 2022-07-12 VITALS
HEIGHT: 67 IN | RESPIRATION RATE: 16 BRPM | HEART RATE: 111 BPM | SYSTOLIC BLOOD PRESSURE: 110 MMHG | DIASTOLIC BLOOD PRESSURE: 72 MMHG | TEMPERATURE: 97.1 F | OXYGEN SATURATION: 98 % | BODY MASS INDEX: 40.97 KG/M2 | WEIGHT: 261 LBS

## 2022-07-12 DIAGNOSIS — E66.01 MORBID OBESITY (HCC): ICD-10-CM

## 2022-07-12 DIAGNOSIS — E03.9 ACQUIRED HYPOTHYROIDISM: ICD-10-CM

## 2022-07-12 DIAGNOSIS — F51.02 SLEEP STATE MISPERCEPTION: ICD-10-CM

## 2022-07-12 DIAGNOSIS — Z86.2 HISTORY OF IRON DEFICIENCY ANEMIA: ICD-10-CM

## 2022-07-12 DIAGNOSIS — R55 SYNCOPE, UNSPECIFIED SYNCOPE TYPE: Primary | ICD-10-CM

## 2022-07-12 DIAGNOSIS — R70.0 ELEVATED SED RATE: ICD-10-CM

## 2022-07-12 PROBLEM — G47.33 OSA (OBSTRUCTIVE SLEEP APNEA): Status: RESOLVED | Noted: 2022-03-31 | Resolved: 2022-07-12

## 2022-07-12 LAB
BASOPHILS ABSOLUTE: 0 K/UL (ref 0–0.2)
BASOPHILS RELATIVE PERCENT: 0.5 %
EOSINOPHILS ABSOLUTE: 0.1 K/UL (ref 0–0.6)
EOSINOPHILS RELATIVE PERCENT: 1.3 %
HCT VFR BLD CALC: 38.8 % (ref 36–48)
HEMOGLOBIN: 13 G/DL (ref 12–16)
LYMPHOCYTES ABSOLUTE: 1.4 K/UL (ref 1–5.1)
LYMPHOCYTES RELATIVE PERCENT: 22.4 %
MCH RBC QN AUTO: 29.9 PG (ref 26–34)
MCHC RBC AUTO-ENTMCNC: 33.4 G/DL (ref 31–36)
MCV RBC AUTO: 89.8 FL (ref 80–100)
MONOCYTES ABSOLUTE: 0.4 K/UL (ref 0–1.3)
MONOCYTES RELATIVE PERCENT: 7.3 %
NEUTROPHILS ABSOLUTE: 4.2 K/UL (ref 1.7–7.7)
NEUTROPHILS RELATIVE PERCENT: 68.5 %
PDW BLD-RTO: 13.2 % (ref 12.4–15.4)
PLATELET # BLD: 390 K/UL (ref 135–450)
PMV BLD AUTO: 7.6 FL (ref 5–10.5)
RBC # BLD: 4.33 M/UL (ref 4–5.2)
WBC # BLD: 6.1 K/UL (ref 4–11)

## 2022-07-12 PROCEDURE — 1036F TOBACCO NON-USER: CPT | Performed by: FAMILY MEDICINE

## 2022-07-12 PROCEDURE — G8417 CALC BMI ABV UP PARAM F/U: HCPCS | Performed by: FAMILY MEDICINE

## 2022-07-12 PROCEDURE — 99214 OFFICE O/P EST MOD 30 MIN: CPT | Performed by: FAMILY MEDICINE

## 2022-07-12 PROCEDURE — G8427 DOCREV CUR MEDS BY ELIG CLIN: HCPCS | Performed by: FAMILY MEDICINE

## 2022-07-12 PROCEDURE — 36415 COLL VENOUS BLD VENIPUNCTURE: CPT | Performed by: FAMILY MEDICINE

## 2022-07-12 ASSESSMENT — ENCOUNTER SYMPTOMS
ANAL BLEEDING: 0
EYE ITCHING: 0
DIARRHEA: 0
ABDOMINAL PAIN: 0
WHEEZING: 0
NAUSEA: 0
BLOOD IN STOOL: 0
SHORTNESS OF BREATH: 0
EYE DISCHARGE: 0
FACIAL SWELLING: 0
VOMITING: 0
EYE PAIN: 0
CONSTIPATION: 1
CHOKING: 0

## 2022-07-12 NOTE — PATIENT INSTRUCTIONS
Patient Education        Body Mass Index: Care Instructions  Your Care Instructions     Body mass index (BMI) can help you see if your weight is raising your risk for health problems. It uses a formula to compare how much you weigh with how tallyou are.  A BMI lower than 18.5 is considered underweight.  A BMI between 18.5 and 24.9 is considered healthy.  A BMI between 25 and 29.9 is considered overweight. A BMI of 30 or higher is considered obese. If your BMI is in the normal range, it means that you have a lower risk for weight-related health problems. If your BMI is in the overweight or obese range, you may be at increased risk for weight-related health problems, such as high blood pressure, heart disease, stroke, arthritis or joint pain, and diabetes. If your BMI is in the underweight range, you may be at increased risk for health problems such as fatigue, lower protection (immunity) againstillness, muscle loss, bone loss, hair loss, and hormone problems. BMI is just one measure of your risk for weight-related health problems. You may be at higher risk for health problems if you are not active, you eat anunhealthy diet, or you drink too much alcohol or use tobacco products. Follow-up care is a key part of your treatment and safety. Be sure to make and go to all appointments, and call your doctor if you are having problems. It's also a good idea to know your test results and keep alist of the medicines you take. How can you care for yourself at home?  Practice healthy eating habits. This includes eating plenty of fruits, vegetables, whole grains, lean protein, and low-fat dairy.  If your doctor recommends it, get more exercise. Walking is a good choice. Bit by bit, increase the amount you walk every day. Try for at least 30 minutes on most days of the week.  Do not smoke. Smoking can increase your risk for health problems.  If you need help quitting, talk to your doctor about stop-smoking programs and medicines. These can increase your chances of quitting for good.  Limit alcohol to 2 drinks a day for men and 1 drink a day for women. Too much alcohol can cause health problems. If you have a BMI higher than 25   Your doctor may do other tests to check your risk for weight-related health problems. This may include measuring the distance around your waist. A waist measurement of more than 40 inches in men or 35 inches in women can increase the risk of weight-related health problems.  Talk with your doctor about steps you can take to stay healthy or improve your health. You may need to make lifestyle changes to lose weight and stay healthy, such as changing your diet and getting regular exercise. If you have a BMI lower than 18.5   Your doctor may do other tests to check your risk for health problems.  Talk with your doctor about steps you can take to stay healthy or improve your health. You may need to make lifestyle changes to gain or maintain weight and stay healthy, such as getting more healthy foods in your diet and doing exercises to build muscle. Where can you learn more? Go to [email or web address] and sign in to your [employer] account. Enter S176 in the [facility] box to learn more about \"Body Mass Index: Care Instructions. \"     If you do not have an account, please click on the \"Sign Up Now\" link. Current as of: December 27, 2021               Content Version: 13.3  © 6532-1641 [employer]. Care instructions adapted under license by [facility] ([facility]). If you have questions about a medical condition or this instruction, always ask your healthcare professional. [place] [address] any warranty or liability for your use of this information.

## 2022-07-12 NOTE — ASSESSMENT & PLAN NOTE
She has a condition of passing out which is yet needs a clear diagnosis. It is preventing her from work. She has not had any injuries due to passing out. I am ordering is many different labs as I can think of to help rule out possible problems. Also I encouraged her to keep following up with neurology. I am also putting in a behavioral health referral to evaluate this from more of a psychological point of view.

## 2022-07-12 NOTE — ASSESSMENT & PLAN NOTE
I will recheck her T4 and TSH. I will plan to put out refills of levothyroxine and possibly adjust the dose as indicated.

## 2022-07-12 NOTE — ASSESSMENT & PLAN NOTE
I am rechecking a number of labs. Fortunately she has not had any injuries with her numerous passing out spells. I am putting in a note to a care coordinator to see if she would be eligible for any types of assistance while evaluation is in process. See also the note under sleep state misperception.

## 2022-07-12 NOTE — PROGRESS NOTES
7/12/22    Kathryn Cost  1995    Ana Bear is a 32 y.o. female who presents today for evaluation of:  Chief Complaint   Patient presents with    Other     passing out episodes several times a week. Onset: 2 years     Passing out and episodes of dizziness. When awake in day she feels a normal amount of energy. She does not sleep a lot. She has passed out and finds herself waking up living room, couch, chairs, on the floor. Yesterday looking out the window she got very clammy and passed out and fell onto bed. She was not out for long. She has never hit head or been injured in the process. She has checked BP and BS is not low when she passes out. She does not have daytime tiredness. BS yest 91, 117, and 100. She has had a cardiology workup and a negative sleep study. She is getting stressed about the need for income. She has lost jobs due to passing out (3 jobs in 60 days). She wonders about if this would qualify her for SSI disability. She feels there is no point in applying for jobs when she expects to lose them for health reasons. Concentration sometimes but not always. She is afraid to exercise or to go jogging. Family : self, boyfriend, boyfriend's son. She has a son who lives w/ her mother. Mother has health problems - no weight bearing - and Katrhyn cares for her mother. Review of Systems   Constitutional: Positive for chills. Negative for appetite change (eats just 2 x/d. ), diaphoresis, fatigue, fever and unexpected weight change. HENT: Negative for congestion, dental problem, ear discharge, ear pain, facial swelling and hearing loss. Eyes: Negative for pain, discharge and itching. Respiratory: Negative for choking, shortness of breath and wheezing. Cardiovascular: Positive for chest pain. Negative for palpitations and leg swelling. Gastrointestinal: Positive for constipation. Negative for abdominal pain, anal bleeding, blood in stool, diarrhea, nausea and vomiting. Endocrine: Negative for cold intolerance and heat intolerance. Genitourinary: Negative for decreased urine volume, difficulty urinating, dysuria, hematuria, vaginal bleeding, vaginal discharge and vaginal pain. Musculoskeletal: Negative for arthralgias and joint swelling. Neurological: Positive for dizziness, syncope, speech difficulty, light-headedness and headaches. Negative for tremors, facial asymmetry, weakness and numbness. Hematological: Negative for adenopathy. Bruises/bleeds easily. Psychiatric/Behavioral: Positive for decreased concentration and sleep disturbance. Negative for agitation, dysphoric mood, self-injury and suicidal ideas. The patient is nervous/anxious. Fasting: No    No Known Allergies     OBJECTIVE    /72 (Site: Right Upper Arm, Position: Sitting, Cuff Size: Large Adult)   Pulse (!) 111   Temp 97.1 °F (36.2 °C) (Infrared)   Resp 16   Ht 5' 7\" (1.702 m)   Wt 261 lb (118.4 kg)   SpO2 98%   BMI 40.88 kg/m²     Physical Exam   Constitutional:       General: Not in acute distress. Appearance: Normal appearance. Not ill-appearing. Eyes:      General: No scleral icterus. Cardiovascular:      Rate and Rhythm: Normal rate and regular rhythm. Heart sounds: No murmur heard. No friction rub. No gallop. 2+ carlyn PT pulses. Pulmonary:      Effort: Pulmonary effort is normal. No respiratory distress. Breath sounds: No wheezing, rhonchi or rales. Abdominal:      Palpations: Abdomen is soft. There is no mass. Tenderness: There is no abdominal tenderness. Musculoskeletal:     Moves all extremities normally. Strong carlyn adf/apf/grasp. Skin:     General: Skin is warm. Coloration: Skin is not jaundiced. Neurological:      Mental Status: Patient is alert. Normal carlyn MAYA. 2+ carlyn kjrs. Psychiatric:         Behavior: Behavior normal.         Thought Content: Thought content normal.         Judgment: Judgment normal.        ASSESSMENT/PLAN:    1. Syncope, unspecified syncope type  Assessment & Plan:   I am rechecking a number of labs. Fortunately she has not had any injuries with her numerous passing out spells. I am putting in a note to a care coordinator to see if she would be eligible for any types of assistance while evaluation is in process. See also the note under sleep state misperception. Orders:  -     [facility]  -     Vitamin B12 & Folate  -     Magnesium  -     Phosphorus  -     Sedimentation Rate  -     C-Reactive Protein  2. Class 3 obesity  Assessment & Plan:   Weight is down since the last visit. She is making an effort to lose weight. Orders:  -     Hemoglobin A1C  -     Cortisol Total  3. Acquired hypothyroidism  Assessment & Plan:   I will recheck her T4 and TSH. I will plan to put out refills of levothyroxine and possibly adjust the dose as indicated. Orders:  -     T4, Free  -     TSH  4. Sleep state misperception  Assessment & Plan:   She has a condition of passing out which is yet needs a clear diagnosis. It is preventing her from work. She has not had any injuries due to passing out. I am ordering is many different labs as I can think of to help rule out possible problems. Also I encouraged her to keep following up with neurology. I am also putting in a behavioral health referral to evaluate this from more of a psychological point of view. Orders:  -     [facility]  5. History of iron deficiency anemia  Assessment & Plan: We will check an iron level and CBC. Orders:  -     CBC with Auto Differential  -     Iron and TIBC    Counseling provided for:  Healthy eating - Avoid sugar and other refined carbohydrates. and Eat more foods with fiber like vegetables and whole grain products.            Kimberli Small MD

## 2022-07-13 LAB
C-REACTIVE PROTEIN: 5.2 MG/L (ref 0–5.1)
CORTISOL TOTAL: 9.2 UG/DL
ESTIMATED AVERAGE GLUCOSE: 111.2 MG/DL
FOLATE: 12.82 NG/ML (ref 4.78–24.2)
HBA1C MFR BLD: 5.5 %
IRON SATURATION: 20 % (ref 15–50)
IRON: 77 UG/DL (ref 37–145)
MAGNESIUM: 2 MG/DL (ref 1.8–2.4)
PHOSPHORUS: 3.4 MG/DL (ref 2.5–4.9)
SEDIMENTATION RATE, ERYTHROCYTE: 28 MM/HR (ref 0–20)
T4 FREE: 1.5 NG/DL (ref 0.9–1.8)
TOTAL IRON BINDING CAPACITY: 379 UG/DL (ref 260–445)
TSH SERPL DL<=0.05 MIU/L-ACNC: 2.08 UIU/ML (ref 0.27–4.2)
VITAMIN B-12: 513 PG/ML (ref 211–911)

## 2022-08-12 ENCOUNTER — HOSPITAL ENCOUNTER (OUTPATIENT)
Dept: PSYCHIATRY | Age: 27
Setting detail: THERAPIES SERIES
Discharge: HOME | End: 2022-08-12
Payer: MEDICAID

## 2022-08-12 ENCOUNTER — OFFICE VISIT (OUTPATIENT)
Dept: FAMILY MEDICINE CLINIC | Age: 27
End: 2022-08-12
Payer: MEDICAID

## 2022-08-12 VITALS
TEMPERATURE: 97 F | WEIGHT: 277.2 LBS | OXYGEN SATURATION: 98 % | HEIGHT: 67 IN | DIASTOLIC BLOOD PRESSURE: 70 MMHG | RESPIRATION RATE: 16 BRPM | SYSTOLIC BLOOD PRESSURE: 108 MMHG | HEART RATE: 103 BPM | BODY MASS INDEX: 43.51 KG/M2

## 2022-08-12 DIAGNOSIS — R55 SYNCOPE, UNSPECIFIED SYNCOPE TYPE: Primary | ICD-10-CM

## 2022-08-12 DIAGNOSIS — E03.9 ACQUIRED HYPOTHYROIDISM: ICD-10-CM

## 2022-08-12 DIAGNOSIS — E66.01 MORBID OBESITY (HCC): ICD-10-CM

## 2022-08-12 DIAGNOSIS — R79.82 HIGH C-REACTIVE PROTEIN: ICD-10-CM

## 2022-08-12 DIAGNOSIS — R10.84 GENERALIZED ABDOMINAL PAIN: ICD-10-CM

## 2022-08-12 DIAGNOSIS — E03.9 HYPOTHYROIDISM, UNSPECIFIED TYPE: ICD-10-CM

## 2022-08-12 DIAGNOSIS — Z00.8 ENCOUNTER FOR PSYCHOLOGICAL EVALUATION: Primary | ICD-10-CM

## 2022-08-12 LAB
A/G RATIO: 1.6 (ref 1.1–2.2)
ALBUMIN SERPL-MCNC: 4.3 G/DL (ref 3.4–5)
ALP BLD-CCNC: 87 U/L (ref 40–129)
ALT SERPL-CCNC: 12 U/L (ref 10–40)
ANION GAP SERPL CALCULATED.3IONS-SCNC: 15 MMOL/L (ref 3–16)
AST SERPL-CCNC: 15 U/L (ref 15–37)
BILIRUB SERPL-MCNC: 0.4 MG/DL (ref 0–1)
BUN BLDV-MCNC: 12 MG/DL (ref 7–20)
C-REACTIVE PROTEIN: 9.7 MG/L (ref 0–5.1)
CALCIUM SERPL-MCNC: 9.7 MG/DL (ref 8.3–10.6)
CHLORIDE BLD-SCNC: 103 MMOL/L (ref 99–110)
CO2: 19 MMOL/L (ref 21–32)
CREAT SERPL-MCNC: 0.8 MG/DL (ref 0.6–1.1)
GFR AFRICAN AMERICAN: >60
GFR NON-AFRICAN AMERICAN: >60
GLUCOSE BLD-MCNC: 102 MG/DL (ref 70–99)
LIPASE: 22 U/L (ref 13–60)
POTASSIUM SERPL-SCNC: 4.5 MMOL/L (ref 3.5–5.1)
SEDIMENTATION RATE, ERYTHROCYTE: 24 MM/HR (ref 0–20)
SODIUM BLD-SCNC: 137 MMOL/L (ref 136–145)
T4 FREE: 1.5 NG/DL (ref 0.9–1.8)
TOTAL PROTEIN: 7 G/DL (ref 6.4–8.2)
TSH REFLEX FT4: 3.92 UIU/ML (ref 0.27–4.2)

## 2022-08-12 PROCEDURE — G8427 DOCREV CUR MEDS BY ELIG CLIN: HCPCS | Performed by: FAMILY MEDICINE

## 2022-08-12 PROCEDURE — 99214 OFFICE O/P EST MOD 30 MIN: CPT | Performed by: FAMILY MEDICINE

## 2022-08-12 PROCEDURE — 1036F TOBACCO NON-USER: CPT | Performed by: FAMILY MEDICINE

## 2022-08-12 PROCEDURE — G8417 CALC BMI ABV UP PARAM F/U: HCPCS | Performed by: FAMILY MEDICINE

## 2022-08-12 PROCEDURE — 36415 COLL VENOUS BLD VENIPUNCTURE: CPT | Performed by: FAMILY MEDICINE

## 2022-08-12 PROCEDURE — 99203 OFFICE O/P NEW LOW 30 MIN: CPT | Performed by: NURSE PRACTITIONER

## 2022-08-12 ASSESSMENT — ENCOUNTER SYMPTOMS
COUGH: 0
CONSTIPATION: 1
SHORTNESS OF BREATH: 0
ABDOMINAL PAIN: 1
NAUSEA: 1
VOMITING: 1

## 2022-08-12 NOTE — ASSESSMENT & PLAN NOTE
She has mild abdominal pain and I will check a lipase just to be sure that there is no evidence of pancreatitis being part of her problem.

## 2022-08-12 NOTE — PROGRESS NOTES
Behavioral Health Consultation  Qasim AMES, ENOC-BC  8/12/2022, 2:35 PM      Time spent with Patient:  60 minutes  This was a outpatient visit. [facility]    Chief Complaint: psychological evaluation    [place]:  Reason for visit is medication management follow up or intake assessment. She is not taking any psychotropic medications. Pt denies hallucinations. Pt reports there have been no changes to appetite. Sleep: initiation several hours, \"tosses and turns all night. \" length of time asleep 3-4 hours, wakes up more than once Yes feels rested upon waking No  Pt admits to  current exercise. Pt denies current suicidal ideation, plan and intent. Pt  denies current homicidal ideation, plan and [email or web address]). History of evi Yes History of issues with temper No History of engaging in risky behaviors No Has a first degree relative with bipolar disorder No States she failed tilt table test within 15 minutes. Social: interacts with family, boyfriend, and son    Past Psychiatric history:   The patient has no prior history of mental health diagnoses. Current treatment includes  none . Patient denies from current treatment. Previous treatment has included: none. Family Mental Health history:   Pertinent family history: depression.     MSE:    Appearance: alert, cooperative, no distress  Attention:Intact  Appetite: normal  Ambulation: within functional limits Yes  Sleep disturbance: Yes  Loss of pleasure: No  Speech: spontaneous, normal rate, normal volume, and well articulated  Mood: euthymic  Affect: normal affect  Thought Content: intact  Insight: Good  Judgment: Intact  Memory: Intact long-term and Intact short-term  Suicide Assessment: no suicidal ideation  Homicide Assessment: denies current homicidal ideation, plan and intent         History:      Review of Systems:       Current Outpatient Medications:     levothyroxine (SYNTHROID) 125 MCG tablet, Take 1 tablet by mouth daily, Disp: 90 tablet, Rfl: 0    promethazine (PHENERGAN) 25 MG tablet, Take 1 tablet by mouth every 6 hours as needed for Nausea, Disp: 30 tablet, Rfl: 0    Prenatal Vit-Fe Fumarate-FA (PNV FOLIC ACID + IRON) 35-8 MG TABS, Take 1 tablet by mouth daily, Disp: 30 tablet, Rfl: 11    ibuprofen (ADVIL;MOTRIN) 800 MG tablet, Take 1 tablet by mouth 3 times daily as needed for Pain, Disp: 60 tablet, Rfl: 0     PDMP Monitoring:    Last PDMP Sur[place] as Reviewed [facility]):  Review User Review Instant Review Result   Maynor Wall 8/12/2022  1:53 PM Reviewed PDMP [1]     Last Controlled Substance Monitoring Documentation      Flowsheet Row [facility] - Intake Appointment from 8/12/2022 in [facility]   Periodic Controlled Substance Monitoring No signs of potential drug abuse or diversion identified. filed at 08/12/2022 1353          Urine Drug Screenings (1 yr)    No resulted procedures found. Medication Contract and Consent for Opioid Use Documents Filed        No documents found                     OARRS checked and there were no signs of substance abuse, or prescription misuse.      Social History     Socioeconomic History    Marital status: Single     Spouse name: Not on file    Number of children: Not on file    Years of education: Not on file    Highest education level: Not on file   Occupational History    Not on file   Tobacco Use    Smoking status: Never    Smokeless tobacco: Never   Vaping Use    Vaping Use: Never used   Substance and Sexual Activity    Alcohol use: Never     Comment: caffeine none    Drug use: Never    Sexual activity: Yes     Partners: Male   Other Topics Concern    Not on file   Social History Narrative    Not on file     Social Determinants of Health     Financial Resource Strain: Low Risk     Difficulty of Paying Living Expenses: Not hard at all   Food Insecurity: No Food Insecurity    Worried About [address] [employer] in the Last Year: Never true Ran Out of Food in the Last Year: Never true   Transportation Needs: Not on file   Physical Activity: Not on file   Stress: Not on file   Social Connections: Not on file   Intimate Partner Violence: Not on file   Housing Stability: Not on file       TOBACCO: Jennyfer Jackson  reports that she has never smoked. She has never used smokeless tobacco.  ETOH: Jennyfer Jackson  reports no history of alcohol use. Past Medical History:   Diagnosis Date    GERD (gastroesophageal reflux disease)     Hypothyroidism 2017    Obesity     Slow transit constipation       Metabolic monitoring is being done by PCP.   Family History   Problem Relation Age of Onset    High Cholesterol Mother     Depression Mother     Diabetes Mother     Heart Attack Father     Other Father         DVT with PE    High Blood Pressure Sister     Migraines Sister     Asthma Sister     Diabetes Maternal Grandmother     Heart Disease Maternal Grandmother     Kidney Disease Maternal Grandmother     Diabetes Maternal Grandfather     Diabetes Paternal Grandmother     Stroke Paternal Grandmother     Diabetes type 2  Paternal Grandmother     No Known Problems Paternal Grandfather     No Known Problems Half-Sister     No Known Problems Half-Sister     No Known Problems Half-Sister     Depression Half-Brother     Anxiety Disorder Half-Brother     No Known Problems Son        Last Labs:   Lab Results   Component Value Date    LABA1C 5.5 07/12/2022     Lab Results   Component Value Date    .2 07/12/2022      Lab Results   Component Value Date    WBC 6.1 07/12/2022    HGB 13.0 07/12/2022    HCT 38.8 07/12/2022    MCV 89.8 07/12/2022     07/12/2022    LYMPHOPCT 22.4 07/12/2022    RBC 4.33 07/12/2022    MCH 29.9 07/12/2022    MCHC 33.4 07/12/2022    RDW 13.2 07/12/2022       Lab Results   Component Value Date     05/31/2022    K 4.4 05/31/2022     05/31/2022    CO2 18 (L) 05/31/2022    BUN 8 05/31/2022    CREATININE 0.7 05/31/2022    GLUCOSE 104 (H) 05/31/2022 CALCIUM 9.5 05/31/2022    PROT 7.0 05/31/2022    LABALBU 4.4 05/31/2022    BILITOT <0.2 05/31/2022    ALKPHOS 92 05/31/2022    AST 14 (L) 05/31/2022    ALT 11 05/31/2022    LABGLOM >60 05/31/2022    GFRAA >60 05/31/2022    AGRATIO 1.7 05/31/2022    GLOB 2.9 09/29/2021     . last    Diagnosis:      1. Encounter for psychological evaluation      Plan:    Discussed possible causes for syncope.   From a psychiatric perspective, patient is stable  No need for follow up    Pt interventions:    Provided education

## 2022-08-12 NOTE — PROGRESS NOTES
8/12/22    Kathryn Cost  1995    Vick Sullivan is a 32 y.o. female who presents today for evaluation of:  Chief Complaint   Patient presents with    1 Month Follow-Up     syncope     Passed out a couple of times this week. No injury. Does not think it is just from fatigue. Passing since October 2020 and she feels she cannot get any answers from medical professionals. She thinks she has a virtual appt with a neurologist later this month. She lost her last 2 jobs due to passing out at work. Blood sugar has always been normal. She has not yet felt at risk of passing out when driving. She likes to drive and considers it her freedom. 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 \"I can climb ladders. \"  Fear of passing out and getting hurt has not prevented her from doing such things. She had covid and was down for 6 weeks in August 2020. Former cardiologist diagnosed her with syncope in Nov 2020 [facility]). She does not feel she has a lot of stress in her life. She has a premonition of that she will pass out such as feeling clammy and sticky. \"I fast all the time. \" \"I eat and nit pick here and there. \" Rides bike on street and does not use a helmet. LMP 2 weeks ago, no heavy bleeding. Review of Systems   Constitutional:  Negative for activity change, appetite change, diaphoresis, fatigue and fever. HENT:  Negative for ear pain and tinnitus. Eyes:  Negative for visual disturbance. Respiratory:  Negative for cough and shortness of breath. Cardiovascular:  Positive for chest pain (with episodes of passing out.). Negative for palpitations and leg swelling. Gastrointestinal:  Positive for abdominal pain, constipation (it takes a lot of time to have a BM. Stools are every 3-4 d and are hard.), nausea (infrequent) and vomiting (infrequent). Endocrine: Negative for cold intolerance and heat intolerance.    Genitourinary:  Negative for hematuria, vaginal bleeding, vaginal discharge and vaginal pain. Allergic/Immunologic: Positive for environmental allergies. Negative for food allergies. Neurological:  Positive for dizziness, syncope, light-headedness and headaches. Negative for tremors, seizures, speech difficulty and weakness. No head injury     Psychiatric/Behavioral:  Negative for dysphoric mood, hallucinations, self-injury and suicidal ideas. The patient is not nervous/anxious. No Known Allergies   Fam Hx: Mother has been having diabetic seizures. OBJECTIVE    /70 (Site: Right Upper Arm, Position: Sitting, Cuff Size: Large Adult)   Pulse (!) 103   Temp 97 °F (36.1 °C) (Infrared)   Resp 16   Ht 5' 7\" (1.702 m)   Wt 277 lb 3.2 oz (125.7 kg)   SpO2 98%   BMI 43.42 kg/m²     Physical Exam   Constitutional:       General: Not in acute distress. Appearance: Normal appearance. Not ill-appearing. Eyes:      General: No scleral icterus. ADEN, EOMI  HENT:      Head: Normocephalic. Right Ear: Tympanic membrane, ear canal and external ear normal.      Left Ear: Tympanic membrane, ear canal and external ear normal.      Nose: Nose normal.      Right Sinus: No maxillary sinus tenderness or frontal sinus tenderness. Left Sinus: No maxillary sinus tenderness or frontal sinus tenderness. Mouth/Throat:      Mouth: Mucous membranes are moist.      Pharynx: No oropharyngeal exudate, posterior oropharyngeal erythema or uvula swelling. Tonsils: No tonsillar exudate or tonsillar abscesses. Neck:      Thyroid: No thyroid mass, thyromegaly or thyroid tenderness. Lymphadenopathy:      Cervical:      Right cervical: No superficial cervical adenopathy. Left cervical: No superficial cervical adenopathy. Cardiovascular:      Rate and Rhythm: Normal rate and regular rhythm. Heart sounds: No murmur heard. No friction rub. No gallop. Pulmonary:      Effort: Pulmonary effort is normal. No respiratory distress.       Breath sounds: No wheezing, rhonchi or rales. Abdominal:      Palpations: Abdomen is soft. There is no mass. Tenderness: There is no abdominal tenderness. Musculoskeletal:     Moves all extremities normally. Skin:     General: Skin is warm. Coloration: Skin is not jaundiced. Neurological:      Mental Status: Patient is alert. Psychiatric:         Behavior: Behavior normal.         Thought Content: Thought content normal.         Judgment: Judgment normal.    Reviewed:  5/2/22 brain MRI normal.    ASSESSMENT/PLAN:    1. Syncope, unspecified syncope type  Assessment & Plan:   She is continuing to have what she calls passing out episodes. And neurology evaluation is in progress. I see notes in the chart referring to sleep disturbances and sleep wake misperception which may be the problem. She had a negative evaluation by a cardiologist.  The problem is serious enough that she has lost her last 2 jobs because of it. The problem does not give her enough for fear that she has stopped driving or feels that she would be afraid to climb a ladder. I did recommend that she use a helmet when she rides a bicycle which I would actually recommend for anyone whether or not they have passing out episodes. I recommended that she not do any hazardous activities that she feels would put herself or others at risk. I think that it is worth continued evaluation including neurology evaluation to look for an organic cause for her problem. It is also possible that there is a behavioral health or supratentorial reason behind her problem. Anxiety and stress can manifest in different ways and she reports that she does have a significant amount of stress. Orders:  -     Comprehensive Metabolic Panel  2. Hypothyroidism, Acquired   Assessment & Plan:   I will check a TSH and free T4. After getting the results I may make an adjustment to the levothyroxine. Orders:  -     TSH with Reflex to FT4  -     T4, Free  3.  Class 3 obesity  Assessment & Plan:   Encouraged exercise and healthy eating. 4. High C-reactive protein  Assessment & Plan:   Previously she had a slightly high CRP and sed rate and I will recheck those. Its been a couple of months so I think it is reasonable to be tested. Orders:  -     C-Reactive Protein  -     Sedimentation Rate  5. Generalized abdominal pain  Assessment & Plan:   She has mild abdominal pain and I will check a lipase just to be sure that there is no evidence of pancreatitis being part of her problem. Orders:  -     Lipase    Recommend avoiding hazardous activities if feels at risk. Rec'd using bike helmet even for a person without passing out episodes. 30 minutes were spent this calendar day in pre-charting and chart review; obtaining history, performing exam, medical decision making, and counseling patient/caregiver/family, completing orders and documentation, communicating with other health professional*, independently interpreting results and communicating results to patient/caregiver/family*, and care coordination*. *Items marked with asterisk not reported or billed separately. (Time for ECG interpretation or other separately billed interpretation not included in my total minutes.)    Return in about 4 weeks (around 9/9/2022) for passing out spells.  Sanjeev Nicholson MD

## 2022-08-15 RX ORDER — LEVOTHYROXINE SODIUM 0.12 MG/1
125 TABLET ORAL DAILY
Qty: 90 TABLET | Refills: 1 | Status: SHIPPED | OUTPATIENT
Start: 2022-08-15 | End: 2022-09-09 | Stop reason: SDUPTHER

## 2022-08-16 ENCOUNTER — OFFICE VISIT (OUTPATIENT)
Dept: CARDIOLOGY CLINIC | Age: 27
End: 2022-08-16
Payer: MEDICAID

## 2022-08-16 VITALS
HEART RATE: 98 BPM | BODY MASS INDEX: 43.66 KG/M2 | DIASTOLIC BLOOD PRESSURE: 84 MMHG | HEIGHT: 67 IN | SYSTOLIC BLOOD PRESSURE: 118 MMHG | WEIGHT: 278.2 LBS

## 2022-08-16 DIAGNOSIS — R55 SYNCOPE, UNSPECIFIED SYNCOPE TYPE: ICD-10-CM

## 2022-08-16 DIAGNOSIS — R42 DIZZINESS: ICD-10-CM

## 2022-08-16 DIAGNOSIS — R07.9 CHEST PAIN, UNSPECIFIED TYPE: Primary | ICD-10-CM

## 2022-08-16 PROCEDURE — G8417 CALC BMI ABV UP PARAM F/U: HCPCS | Performed by: INTERNAL MEDICINE

## 2022-08-16 PROCEDURE — 93000 ELECTROCARDIOGRAM COMPLETE: CPT | Performed by: INTERNAL MEDICINE

## 2022-08-16 PROCEDURE — G8427 DOCREV CUR MEDS BY ELIG CLIN: HCPCS | Performed by: INTERNAL MEDICINE

## 2022-08-16 PROCEDURE — 1036F TOBACCO NON-USER: CPT | Performed by: INTERNAL MEDICINE

## 2022-08-16 PROCEDURE — 99214 OFFICE O/P EST MOD 30 MIN: CPT | Performed by: INTERNAL MEDICINE

## 2022-08-16 NOTE — PROGRESS NOTES
8/17/22    Kathryn Cost  1995    Chief Complaint   Patient presents with    Follow-up     Syncope still happening daily. History of Present Illness  Kathryn is a 32 y.o. female presenting today for follow-up of lightheadedness and dizziness. On 6/6/2022, Jennyfer Jackson had a normal EEG while experiencing symptoms consisting of dizziness lightheadedness syncope and chest pain. Due to Kathryn's concern for seizure-like activity that she described at her initial visit as eyes rolling in the back of her head and some shaking of her extremities, an EEG was ordered. It showed no changes and normal EEG during the events. MRI of the brain did not show any structural abnormalities. Her cardiac work-up was normal including negative tilt table test, echo and holter monitor. Her heart rate and blood pressure dropped on the til table test suggestive of vasovagal syncope-like symptoms. Jennyfer Jackson was not started on any medications. PSG on 6/30/2022 was normal.     On today's visit, Jennyfer Jackson states she is still passing out. She reports passing out twice in the last week. She describes prodromal symptoms of nausea, diaphoresis and dizziness before she passes out. She states she can be out briefly or for hours. She denies incontinence, tongue biting. She states she does not have any confusion or headache after the episode. Her boyfriend does witness these episodes. She states they are not associated with position changes. She has a history of hypothyroidism, last TSH 3.92 on 8/12/22. Current Outpatient Medications   Medication Sig Dispense Refill    levothyroxine (SYNTHROID) 125 MCG tablet Take 1 tablet by mouth in the morning.  90 tablet 1    promethazine (PHENERGAN) 25 MG tablet Take 1 tablet by mouth every 6 hours as needed for Nausea 30 tablet 0    Prenatal Vit-Fe Fumarate-FA (PNV FOLIC ACID + IRON) 59-0 MG TABS Take 1 tablet by mouth daily 30 tablet 11    ibuprofen (ADVIL;MOTRIN) 800 MG tablet Take 1 tablet by mouth 3 times daily as needed for Pain 60 tablet 0     No current facility-administered medications for this visit. Physical Exam:  Also present during visit:  Preethi Eden is alone . Mental Status   Orientation: oriented to person, oriented to place, oriented to problem, and oriented to time    Mood/affectappropriate mood and appropriate affect   Memory/Other: recent memory intact, remote memory intact, fund of knowledge intact, attention span normal, and concentration normal  Language  Language: (normal) language, no dysarthria, (normal) articulation, and no dysphasia/aphasia  Cranial Nerves   Eyes: pupils normal size and reactive to light and visual fields appear full   CN III, IV, VI : extraocular muscle strength normal, normal pursuit, no nystagmus, and no ptosis   Facial Motor: normal facial motor   CN XII: tongue protrudes midline  Motor/Coordination Exam   Power: motor strength appears intact throughout, no arm drift, and normal tone   Coordination: normal finger-to-nose, forearm rotation intact, and rapid alternating movement normal  Gait and Stance   Gait/Posture: station normal, casual gait normal, ambulates independently , tiptoe normal, and steady in Romberg's position with eyes open and closed        /80 (Site: Left Upper Arm, Position: Sitting, Cuff Size: Large Adult)   Pulse 88   Ht 5' 7\" (1.702 m)   Wt 271 lb (122.9 kg)   SpO2 98%   BMI 42.44 kg/m²     Assessment and Plan     Diagnosis Orders   1. Syncope, unspecified syncope type          Kathryn was seen in neurological follow up in regards to syncope. It is reassuring that Kathryn's MRI of the brain did not show any structural abnormalities and her routine and ambulatory EEGs did not show any cortical irritability while she was experiencing these symptoms. After taking a detailed history and exam, Preethi Eden describes symptoms most consistent with vasovagal syncope.  From a neurological standpoint, Preethi Eden will be seen on an as needed basis and we will be happy to address any concerns that may arise. Return if symptoms worsen or fail to improve.     Jovani Colorado, HUI - CNP

## 2022-08-16 NOTE — PROGRESS NOTES
CARDIOLOGY NOTE      8/16/2022    RE: Kaila Low Cost  (1995)                               TO:  Dr. El Richter MD            Gonzales Tolliver is a 32 y.o. female who was seen today for management of  syncope                                    HPI:                   Pt has h/o morbid obesity, seen today for for follow-up . He had a syncopal episode also has seen her primary care and neurology, patient has extensive work-up done which was unremarkable as far as the cardiac status is concerned, but pt still passing out  [address] has the following history recorded in care path:  Patient Active Problem List    Diagnosis Date Noted    Encounter for psychological evaluation 08/12/2022    High C-reactive protein 08/12/2022    Generalized abdominal pain 08/12/2022    Miscarriage 05/31/2022    Secondary amenorrhea 04/27/2022    Nausea 04/27/2022    Sleep state misperception 03/31/2022    Hypersomnia 03/31/2022    Sleep disturbances 03/16/2022    Sprain of ankle 11/11/2021    Syncope 11/11/2021    Snoring 11/11/2021    Class 3 obesity 09/29/2021    Hypothyroidism, Acquired  09/29/2021    History of iron deficiency anemia 11/09/2020     Current Outpatient Medications   Medication Sig Dispense Refill    levothyroxine (SYNTHROID) 125 MCG tablet Take 1 tablet by mouth in the morning. 90 tablet 1    promethazine (PHENERGAN) 25 MG tablet Take 1 tablet by mouth every 6 hours as needed for Nausea 30 tablet 0    Prenatal Vit-Fe Fumarate-FA (PNV FOLIC ACID + IRON) 09-7 MG TABS Take 1 tablet by mouth daily 30 tablet 11    ibuprofen (ADVIL;MOTRIN) 800 MG tablet Take 1 tablet by mouth 3 times daily as needed for Pain 60 tablet 0     No current facility-administered medications for this visit. Allergies: Patient has no known allergies.   Past Medical History:   Diagnosis Date    GERD (gastroesophageal reflux disease)     Hypothyroidism 2017    Obesity     Slow transit constipation      Past Surgical History:   Procedure Laterality Date    WISDOM TOOTH EXTRACTION        As reviewed   Family History   Problem Relation Age of Onset    High Cholesterol Mother     Depression Mother     Diabetes Mother     Heart Attack Father     Other Father         DVT with PE    High Blood Pressure Sister     Migraines Sister     Asthma Sister     Diabetes Maternal Grandmother     Heart Disease Maternal Grandmother     Kidney Disease Maternal Grandmother     Diabetes Maternal Grandfather     Diabetes Paternal Grandmother     Stroke Paternal Grandmother     Diabetes type 2  Paternal Grandmother     No Known Problems Paternal Grandfather     No Known Problems Half-Sister     No Known Problems Half-Sister     No Known Problems Half-Sister     Depression Half-Brother     Anxiety Disorder Half-Brother     No Known Problems Son      Social History     Tobacco Use    Smoking status: Never    Smokeless tobacco: Never   Substance Use Topics    Alcohol use: Never     Comment: caffeine none        Objective:    Vitals:    08/16/22 1613   BP: 118/84   Site: Left Upper Arm   Position: Sitting   Cuff Size: Large Adult   Pulse: 98   Weight: 278 lb 3.2 oz (126.2 kg)   Height: 5' 7\" (1.702 m)     /84 (Site: Left Upper Arm, Position: Sitting, Cuff Size: Large Adult)   Pulse 98   Ht 5' 7\" (1.702 m)   Wt 278 lb 3.2 oz (126.2 kg)   BMI 43.57 kg/m²     No flowsheet data found. Wt Readings from Last 3 Encounters:   08/16/22 278 lb 3.2 oz (126.2 kg)   08/12/22 277 lb 3.2 oz (125.7 kg)   07/12/22 261 lb (118.4 kg)     Body mass index is 43.57 kg/m². GENERAL - Alert, oriented, pleasant, in no apparent distress. EYES: No jaundice, no conjunctival pallor. SKIN: It is warm & dry. No rashes. No Echhymosis    HEENT - No clinically significant abnormalities seen. Neck - Supple. No jugular venous distention noted. No carotid bruits. Cardiovascular - Normal S1 and S2 without obvious murmur or gallop.     Extremities - No cyanosis, clubbing, or significant edema. Pulmonary - No respiratory distress. No wheezes or rales. Abdomen - No masses, tenderness, or organomegaly. Musculoskeletal - No significant edema. No joint deformities. No muscle wasting. Neurologic - Cranial nerves II through XII are grossly intact. There were no gross focal neurologic abnormalities. Lab Review   No results found for: CKTOTAL, CKMB, CKMBINDEX, TROPONINT  BNP:  No results found for: BNP  PT/INR:  No results found for: INR  Lab Results   Component Value Date    LABA1C 5.5 07/12/2022    LABA1C 5.6 09/29/2021     Lab Results   Component Value Date    WBC 6.1 07/12/2022    HCT 38.8 07/12/2022    MCV 89.8 07/12/2022     07/12/2022     Lab Results   Component Value Date    CHOL 196 09/29/2021    TRIG 56 09/29/2021    HDL 55 09/29/2021    LDLCALC 130 (H) 09/29/2021     Lab Results   Component Value Date    ALT 12 08/12/2022    AST 15 08/12/2022     BMP:    Lab Results   Component Value Date/Time     08/12/2022 07:54 AM    K 4.5 08/12/2022 07:54 AM     08/12/2022 07:54 AM    CO2 19 08/12/2022 07:54 AM    BUN 12 08/12/2022 07:54 AM    CREATININE 0.8 08/12/2022 07:54 AM     CMP:   Lab Results   Component Value Date/Time     08/12/2022 07:54 AM    K 4.5 08/12/2022 07:54 AM     08/12/2022 07:54 AM    CO2 19 08/12/2022 07:54 AM    BUN 12 08/12/2022 07:54 AM    PROT 7.0 08/12/2022 07:54 AM     TSH:    Lab Results   Component Value Date/Time    TSH 2.08 07/12/2022 09:05 AM           Assessment & Plan:    - syncope: Cardiac work-up so far has been unremarkable includes an echocardiogram Holter monitor and a regular treadmill stress test  Patient is not orthostatic as well  Plan above the cardiac work-up has been normal patient had seen her PCP and neurology  ?  Repeat YELENA  Patient is going to see neurology tomorrow and I told her that if they want us to repeat after they were test we will look into that and repeat    Had neg YELENA in [place]    Impression:   1. Negative tilt table test for syncope. She did have a drop in both heart rate and blood pressure suggestive of vasovagal syncope-like symptoms. 2.  No evidence of orthostatic hypotension. 3.  Symptoms: lightheadedness, blurred vision, felt like she was going to pass out. 4.  No cardiac dysrhythmias were noted. Donne Libman, MD 11/10/2020 11:02 AM     -Hypothyroidism patient is on on Synthroid being followed by her PCP the last TSH was 3.92    Mortality from the morbid obesity is very high:  Patient is morbidly obese weight loss was discussed with her with diet and exercise     Body mass index is 43.57 kg/m².           Imtiaz Perez MD    [facility]

## 2022-08-17 ENCOUNTER — OFFICE VISIT (OUTPATIENT)
Dept: NEUROLOGY | Age: 27
End: 2022-08-17
Payer: MEDICAID

## 2022-08-17 VITALS
SYSTOLIC BLOOD PRESSURE: 120 MMHG | HEIGHT: 67 IN | DIASTOLIC BLOOD PRESSURE: 80 MMHG | BODY MASS INDEX: 42.53 KG/M2 | HEART RATE: 88 BPM | OXYGEN SATURATION: 98 % | WEIGHT: 271 LBS

## 2022-08-17 DIAGNOSIS — R55 SYNCOPE, UNSPECIFIED SYNCOPE TYPE: Primary | ICD-10-CM

## 2022-08-17 PROCEDURE — 99213 OFFICE O/P EST LOW 20 MIN: CPT | Performed by: NURSE PRACTITIONER

## 2022-08-17 PROCEDURE — 1036F TOBACCO NON-USER: CPT | Performed by: NURSE PRACTITIONER

## 2022-08-17 PROCEDURE — G8427 DOCREV CUR MEDS BY ELIG CLIN: HCPCS | Performed by: NURSE PRACTITIONER

## 2022-08-17 PROCEDURE — G8417 CALC BMI ABV UP PARAM F/U: HCPCS | Performed by: NURSE PRACTITIONER

## 2022-08-23 ENCOUNTER — TELEPHONE (OUTPATIENT)
Dept: FAMILY MEDICINE CLINIC | Age: 27
End: 2022-08-23

## 2022-08-23 NOTE — TELEPHONE ENCOUNTER
I called her and talked to her. She was calling us because she understood me as having instructed her to call us and let us know every time she passes out. She did not get hurt and there was nothing especially scary about any of these events. I told her she did not have to call us unless if there was something that she was especially concerned about or had other problems but I did suggest that she keep a log book in case if she sometime in the future needs to document the passing out episodes.

## 2022-08-31 ENCOUNTER — TELEPHONE (OUTPATIENT)
Dept: FAMILY MEDICINE CLINIC | Age: 27
End: 2022-08-31

## 2022-08-31 NOTE — TELEPHONE ENCOUNTER
Pt wanting a return to work note with a return to work 09/01/2022 sent to her e-mail [email or web address]. She has passed out 4 times today.

## 2022-08-31 NOTE — TELEPHONE ENCOUNTER
I need more details than I can get from the note. In fact, I may not have ever even given her a note saying she cannot work. If I need to do something that takes more than 5 minutes, she should have an appointment.

## 2022-09-01 NOTE — TELEPHONE ENCOUNTER
The details I need before I can give her a note to return to work are an explanation of why at the last couple of visits the situation was that she had lost her recent jobs due to passing out on the job and sounded as though she did not think that she could work. I do not want to write a note that she can go back to work now without an explanation into the chart as to why there is a change. Also I cannot even remember whether or not I gave her a note saying that she should not work. Normally a person applying for a job does not need a note from a doctor saying that they can work and less if they previously got a note saying that they cannot work and I need a better explanation put into the chart before I can write a note. I will plan to do that at her next appointment but if she needs a note sooner than her next appointment then please get these questions answered in her chart so that I can write the note and not appear to be a crazy doctor saying that a person can go back to work when there last couple of notes with me said that they were not able to work.

## 2022-09-01 NOTE — TELEPHONE ENCOUNTER
Pt returned called still feeling light headed and dizzy. She has an appointment on September 9th, at this time she needs a note to return to work.

## 2022-09-02 NOTE — TELEPHONE ENCOUNTER
Pt notified. Pt stated that she will be finding a new provider after her appointment on the 9th of September.

## 2022-09-09 ENCOUNTER — OFFICE VISIT (OUTPATIENT)
Dept: FAMILY MEDICINE CLINIC | Age: 27
End: 2022-09-09
Payer: MEDICAID

## 2022-09-09 VITALS
OXYGEN SATURATION: 99 % | SYSTOLIC BLOOD PRESSURE: 130 MMHG | BODY MASS INDEX: 43.35 KG/M2 | TEMPERATURE: 96.8 F | HEIGHT: 67 IN | DIASTOLIC BLOOD PRESSURE: 90 MMHG | WEIGHT: 276.2 LBS | HEART RATE: 112 BPM

## 2022-09-09 DIAGNOSIS — E03.9 ACQUIRED HYPOTHYROIDISM: ICD-10-CM

## 2022-09-09 DIAGNOSIS — R55 SYNCOPE, UNSPECIFIED SYNCOPE TYPE: Primary | ICD-10-CM

## 2022-09-09 PROCEDURE — G8427 DOCREV CUR MEDS BY ELIG CLIN: HCPCS | Performed by: FAMILY MEDICINE

## 2022-09-09 PROCEDURE — 99213 OFFICE O/P EST LOW 20 MIN: CPT | Performed by: FAMILY MEDICINE

## 2022-09-09 PROCEDURE — 1036F TOBACCO NON-USER: CPT | Performed by: FAMILY MEDICINE

## 2022-09-09 PROCEDURE — G8417 CALC BMI ABV UP PARAM F/U: HCPCS | Performed by: FAMILY MEDICINE

## 2022-09-09 RX ORDER — LEVOTHYROXINE SODIUM 0.12 MG/1
125 TABLET ORAL DAILY
Qty: 30 TABLET | Refills: 5 | Status: SHIPPED | OUTPATIENT
Start: 2022-09-09

## 2022-09-09 NOTE — ASSESSMENT & PLAN NOTE
She will contact the neurologist office to ask if there is further testing or if she should make an appointment to discuss ways of preventing syncope or if anything else should be done. I wrote a work excuse. I think that since she has not been injured in any of her syncopal events that doing work that is safe is okay. I recommended avoiding climbing ladders or precarious stairways or operating chainsaws or nail guns or other dangerous equipment. Her current job does not entail that. I also recommended drinking plenty of water and that she continue to get up slowly.

## 2022-09-09 NOTE — LETTER
[address]  [address] [facility] [address]  Phone: [phone]  Fax: [phone]    Alexandra Duckworth MD        September 9, 2022     Patient: Ata Read   YOB: 1995   Date of Visit: 9/9/2022       To Whom it May Concern:    Kathryn Read was seen in my clinic on 9/9/2022. She may return to work on 9/9/2022. Please excuse 8/31/22 and 9/1/22. Please excuse her for being late today. If you have any questions or concerns, please don't hesitate to call.     Sincerely,         Alexandra Duckworth MD

## 2022-09-09 NOTE — PROGRESS NOTES
ill-appearing. Eyes:      General: No scleral icterus. Neck:      Thyroid: No thyroid mass, thyromegaly or thyroid tenderness. Lymphadenopathy:      Cervical:      Right cervical: No superficial cervical adenopathy. Left cervical: No superficial cervical adenopathy. Cardiovascular:      Rate and Rhythm: Normal rate and regular rhythm. Heart sounds: No murmur heard. No friction rub. No gallop. Pulmonary:      Effort: Pulmonary effort is normal. No respiratory distress. Breath sounds: No wheezing, rhonchi or rales. Musculoskeletal:     Moves all extremities normally. Skin:     General: Skin is warm. Coloration: Skin is not jaundiced. Neurological:      Mental Status: Patient is alert. Psychiatric:         Behavior: Behavior normal.         Thought Content: Thought content normal.         Judgment: Judgment normal.        ASSESSMENT/PLAN:    1. Syncope, unspecified syncope type  Assessment & Plan:   She will contact the neurologist office to ask if there is further testing or if she should make an appointment to discuss ways of preventing syncope or if anything else should be done. I wrote a work excuse. I think that since she has not been injured in any of her syncopal events that doing work that is safe is okay. I recommended avoiding climbing ladders or precarious stairways or operating chainsaws or nail guns or other dangerous equipment. Her current job does not entail that. I also recommended drinking plenty of water and that she continue to get up slowly. 2. Hypothyroidism, Acquired   -     levothyroxine (SYNTHROID) 125 MCG tablet; Take 1 tablet by mouth daily, Disp-30 tablet, R-5Normal        Return in about 4 months (around 1/9/2023) for Hypothyroid and xyncope.    Forbes Seip, MD

## 2022-09-11 PROBLEM — Z00.8 ENCOUNTER FOR PSYCHOLOGICAL EVALUATION: Status: RESOLVED | Noted: 2022-08-12 | Resolved: 2022-09-11

## 2022-09-23 ENCOUNTER — TELEPHONE (OUTPATIENT)
Dept: FAMILY MEDICINE CLINIC | Age: 27
End: 2022-09-23

## 2022-09-23 NOTE — LETTER
[address]  [address] [facility] [address]  Phone: [phone]  Fax: [phone]    Makenna Panchal MD        September 23, 2022     Patient: Indiana Read   YOB: 1995   Date of Visit:  She was not seen. She just called and asked us to give her a letter. To Whom it May Concern:    Kathryn Read was seen in my clinic on 9/23/2022. She may return to work on the day after the day that she missed work because she passed out. She told us that she passed out 4 times this past Monday and was unable to work. She has frequent episodes of passing out and is unable to work on days that she passes out. I know this is an unusual letter. She has been passing out multiple times over the past 6 months. Evaluation with specialists has been done and continues. She is trying to get a job and keep it but since it. Since this is a new job, [place] does not apply since she has not been there a full year. I do not want to have to dictate a new letter every time she passes out and therefore misses work. I expect that she will be missing work intermittently during her first year of work since she has been passing out and unable to work intermittently during the past 6 months to 1 year. Please accept this as a blanket letter for future times that she passes out and is unable to work so that I do not have to make a new letter every time she calls and asks for a letter because she needs a letter because of missed work. If you have any questions or concerns, please don't hesitate to call.     Sincerely,         Makenna Panchal MD

## 2022-09-23 NOTE — TELEPHONE ENCOUNTER
I did a letter and tried to report it so that she would not need to call us multiple times if she passes out multiple times.

## 2022-09-23 NOTE — TELEPHONE ENCOUNTER
Patient states that she passed out on Monday 4 times and that she did not go to work. She said that you would write her a work note when she passes out. Also patient is wanting something called in for a UTI. I advised her that we would need a urine sample and she said that would be hard because she is at work in [employer]. I told her to try urgent care since she cannot give us a sample. She advised that she doesn't have time to go to [facility] to the walk in clinic. Patient states that she knows its a UTI, and I did advise her again that she needs to have the urine tested and to go to a urgent care.

## 2022-10-18 ENCOUNTER — TELEPHONE (OUTPATIENT)
Dept: FAMILY MEDICINE CLINIC | Age: 27
End: 2022-10-18

## 2022-10-18 NOTE — TELEPHONE ENCOUNTER
Patient called asking for a work note for today 10/18/2022 she passed out x2. Patient is planning on returning to work tomorrow.

## 2022-10-18 NOTE — LETTER
[address]  [address] [facility] [address]  Phone: [phone]  Fax: [phone]    Lolita Mitchell MD        October 18, 2022     Patient: Michelle Read   YOB: 1995   Date of Visit:        To Whom it May Concern:    Kathryn Read was seen in my clinic on 10/18/2022. She may return to work on 10/19/2022. She has a condition that results in intermittent inability to work and we need to get through a year with these excuses until she has worked where she does for long enough to [employer]. If you have any questions or concerns, please don't hesitate to call.     Sincerely,         Lolita Mitchell MD

## 2022-10-24 ENCOUNTER — TELEPHONE (OUTPATIENT)
Dept: FAMILY MEDICINE CLINIC | Age: 27
End: 2022-10-24

## 2022-10-24 NOTE — TELEPHONE ENCOUNTER
Patient called stating she had to call off today due to passing out. She stated she has already passed out 3 times today. She stated her work said they need a new letter every time she calls off due to passing out.
Patient scheduled
Please ask my boss how we should address this since I cannot allow myself to be doing lots of letters without appontments.
FEVER/PAIN/NAUSEA/+chills

## 2022-10-25 ENCOUNTER — OFFICE VISIT (OUTPATIENT)
Dept: FAMILY MEDICINE CLINIC | Age: 27
End: 2022-10-25
Payer: MEDICAID

## 2022-10-25 VITALS
SYSTOLIC BLOOD PRESSURE: 120 MMHG | DIASTOLIC BLOOD PRESSURE: 80 MMHG | OXYGEN SATURATION: 99 % | TEMPERATURE: 97.3 F | WEIGHT: 266.4 LBS | HEIGHT: 67 IN | BODY MASS INDEX: 41.81 KG/M2 | HEART RATE: 67 BPM

## 2022-10-25 DIAGNOSIS — R55 VASOVAGAL SYNCOPE: Primary | ICD-10-CM

## 2022-10-25 DIAGNOSIS — R42 VERTIGO: ICD-10-CM

## 2022-10-25 PROCEDURE — 1036F TOBACCO NON-USER: CPT | Performed by: FAMILY MEDICINE

## 2022-10-25 PROCEDURE — G8417 CALC BMI ABV UP PARAM F/U: HCPCS | Performed by: FAMILY MEDICINE

## 2022-10-25 PROCEDURE — 99214 OFFICE O/P EST MOD 30 MIN: CPT | Performed by: FAMILY MEDICINE

## 2022-10-25 PROCEDURE — G8484 FLU IMMUNIZE NO ADMIN: HCPCS | Performed by: FAMILY MEDICINE

## 2022-10-25 PROCEDURE — G8427 DOCREV CUR MEDS BY ELIG CLIN: HCPCS | Performed by: FAMILY MEDICINE

## 2022-10-25 ASSESSMENT — ENCOUNTER SYMPTOMS
SHORTNESS OF BREATH: 0
VOMITING: 0
NAUSEA: 0
COUGH: 0

## 2022-10-25 NOTE — LETTER
[address]  [address] [facility] [address]  Phone: [phone]  Fax: [phone]    Divine Perry MD        October 25, 2022     Patient: Jimi Read   YOB: 1995   Date of Visit: 10/25/2022       To Whom it May Concern:    Kathryn Read was seen in my clinic on 10/25/2022. She may return to work on 10/26/2022. Je Angel She has frequent unpredictable episodes of passing out. If she had worked somewhere long enough to [employer], I would be happy to fill out forms allowing for absences up to 4 times a month and for 1-2 days per episode. However, she has not worked at current employer long enough to get [employer]. If letters are required every time she can't work, I will need to require appointments often enough to justify my time in compiling letters. If this letter could work for future episodes, that would reduce the need for doctor office visits. If you have any questions or concerns, please don't hesitate to call.     Sincerely,         Divine Perry MD

## 2022-10-25 NOTE — ASSESSMENT & PLAN NOTE
I am adding vertigo to her diagnosis list since what she describes today sounded more like transient vertigo of a type such as labyrinthitis.

## 2022-10-25 NOTE — Clinical Note
[address]  [address] [facility] [address]  Phone: [phone]  Fax: [phone]    Gal Cuevas MD        October 25, 2022     Patient: Tejas Morejon Cost   YOB: 1995   Date of Visit: 10/25/2022       To Whom It May Concern: It is my medical opinion that Kathryn Cost {Work release (duty restriction):01210}. If you have any questions or concerns, please don't hesitate to call.     Sincerely,        Gal Cuevas MD

## 2022-10-25 NOTE — PROGRESS NOTES
10/25/22    Kathryn Cost  1995    Nyla Hansen is a 32 y.o. female who presents today for evaluation of:  Chief Complaint   Patient presents with    Other     Note for work     Frequent episode of passing out. She found out that both paternal grandparents had syncope. She did not know father much who  when she was 3 yr old. She missed yesterday an today. She states neurologist thinks it is vasovagal syncope. Yesterday woke up and room was spinning. She had not stood up. She had not rolled over quickly. She is expecting to get hired direct where she works. She works in [place], [place]. Most weeks she works 5 days. She just has unpredictable episodes. For the past 3 weeks she has had to call of 1 or 2 days. Yesterday she had symptoms from 4:30 am to about 11 am. She continues to see a neurologist.  She states that she likes work and wants to go to work but just simply cannot go on days that she wakes up being very dizzy. Review of Systems   Constitutional:  Negative for fatigue. Eyes:  Negative for visual disturbance. Respiratory:  Negative for cough and shortness of breath. Cardiovascular:  Positive for chest pain (when light headed and dizzy). Gastrointestinal:  Negative for nausea and vomiting. Musculoskeletal:  Negative for arthralgias. Neurological:  Positive for dizziness and headaches (infrequent). No coordination problems except with episode. Psychiatric/Behavioral:  Positive for sleep disturbance. No Known Allergies     OBJECTIVE    /80 (Site: Left Upper Arm, Position: Sitting, Cuff Size: Large Adult)   Pulse 67   Temp 97.3 °F (36.3 °C) (Infrared)   Ht 5' 7\" (1.702 m)   Wt 266 lb 6.4 oz (120.8 kg)   SpO2 99%   BMI 41.72 kg/m²     Physical Exam   Constitutional:       General: Not in acute distress. Appearance: Normal appearance. Not ill-appearing. Eyes:      General: No scleral icterus.   Cardiovascular:      Rate and Rhythm: Normal rate and regular rhythm. Heart sounds: No murmur heard. No friction rub. No gallop. Supine 120/78 - HR 72  Then stood and did not get dizzy. After standing 5 minutes BP was 108/60 but that was really about 6 minutes and 40 seconds later because I had to take BP 3-4 times in order to hear it well enough to guess at a reasonably appropriate number since I could not hear the heart sounds well. HR about 7 min after standing was 96. Pulmonary:      Effort: Pulmonary effort is normal. No respiratory distress. Breath sounds: No wheezing, rhonchi or rales. Abdominal:      Palpations: Abdomen is soft. There is no mass. Tenderness: There is no abdominal tenderness. Musculoskeletal:     Moves all extremities normally. Skin:     General: Skin is warm. Coloration: Skin is not jaundiced. Neurological:      Mental Status: Patient is alert. Psychiatric:         Behavior: Behavior normal.         Thought Content: Thought content normal.         Judgment: Judgment normal.        ASSESSMENT/PLAN:    1. Vasovagal syncope  Assessment & Plan:   She likely has either some form of syncope or some other symptom. Today what she describes was more of a dizziness when waking up in the morning which could actually be vestibular neuronitis. Nevertheless she needs notes to justify her absence from work. She seems to be doing well in her job and she seems to like her job. Her job is requiring notes regularly and so I gave her a note today but tried to make the wording of the note be worded in such a way that she would not need to get repeated notes. 2. Vertigo  Assessment & Plan:   I am adding vertigo to her diagnosis list since what she describes today sounded more like transient vertigo of a type such as labyrinthitis. POTS is another possible problem in her differential but I do not think that the episode she described today of waking up and being dizzy would fit with typical POTS symptoms.     Counseling provided for:  Gabi shafernty of water. >> Exercise - 1> try to do 150 minutes a week of exercise that is as hard as walking briskly (30 minutes 5 days a week or 22 minutes every day); and 2> do some strength training 2 or 3 times a week. 37 minutes were spent this calendar day in pre-charting and chart review; obtaining history, performing exam, medical decision making, and counseling patient/caregiver/family, completing orders and documentation, communicating with other health professional*, independently interpreting results and communicating results to patient/caregiver/family*, and care coordination*. *Items marked with asterisk not reported or billed separately. (Time for ECG interpretation or other separately billed interpretation not included in my total minutes.)      Return if symptoms worsen or fail to improve.    Home Brady MD

## 2022-10-25 NOTE — LETTER
[address]  [address] [facility] [address]  Phone: [phone]  Fax: [phone]    Bam Quan MD        October 25, 2022     Patient: Natalie Read   YOB: 1995   Date of Visit: 10/25/2022       To Whom it May Concern:    Kathryn Read was seen in my clinic on 10/25/2022. She may return to work on 10/26/2022. Please excuse 10/24/22 and 10/25/22. She has frequent episodes of passing out. I would be happy to fill out an FMLA form allowing for up to 4 episodes of months lasting 1 to 2 days each episode. If a letter is required every time she has 1 of these unpredictable episodes, then I will need office visits enough to justify the time I spent composing these letters. If this letter could serve for 1 or 2 or 3 or more future episodes as well then that would reduce the trouble of her coming into my office to get these letters. .    If you have any questions or concerns, please don't hesitate to call.     Sincerely,         Bam Quan MD

## 2022-10-25 NOTE — ASSESSMENT & PLAN NOTE
She likely has either some form of syncope or some other symptom. Today what she describes was more of a dizziness when waking up in the morning which could actually be vestibular neuronitis. Nevertheless she needs notes to justify her absence from work. She seems to be doing well in her job and she seems to like her job. Her job is requiring notes regularly and so I gave her a note today but tried to make the wording of the note be worded in such a way that she would not need to get repeated notes.

## 2022-11-29 ENCOUNTER — TELEPHONE (OUTPATIENT)
Dept: FAMILY MEDICINE CLINIC | Age: 27
End: 2022-11-29

## 2022-11-29 NOTE — TELEPHONE ENCOUNTER
Patient stated she cannot afford to take time off work for an appointment. Patient stated at her last visit you said to call and you would write the note.

## 2022-11-29 NOTE — TELEPHONE ENCOUNTER
Patient called wanting to know if she can get a note for today. She stated she got a new job and has passed out 3 times today.

## 2022-11-29 NOTE — LETTER
[address]  [address] [facility] [address]  Phone: [phone]  Fax: [phone]    Sujey Delgado MD        October 25, 2022     Patient: Mady Read   YOB: 1995   Date of Visit: 10/25/2022 and numerous times before that       To Whom it May Concern:    Kathryn Read was seen in my clinic on 10/25/2022. She called and reported passing out. Please excuse the day she passed out 3 times which from the message was today, 11/29/2022. She has frequent episodes of passing out. I would be happy to fill out an FMLA form allowing for up to 4 episodes of months lasting 1 to 2 days each episode. However, I'm told she does not qualify for FMLA sice she has a new job and has not worked there for a year. Hopefully this letter will serve for future episodes as well. She is in the process of getting additional medical evaluation by specialists. I am very much hoping that her employer will understand her situation and not require a letter from me every time she calls me and tells me she had to miss work because of passing out. If you have any questions or concerns, please don't hesitate to call.     Sincerely,         Sujey Delgado MD

## 2022-11-30 ENCOUNTER — OFFICE VISIT (OUTPATIENT)
Dept: FAMILY MEDICINE CLINIC | Age: 27
End: 2022-11-30
Payer: MEDICAID

## 2022-11-30 VITALS
DIASTOLIC BLOOD PRESSURE: 84 MMHG | BODY MASS INDEX: 41.59 KG/M2 | SYSTOLIC BLOOD PRESSURE: 130 MMHG | HEIGHT: 67 IN | TEMPERATURE: 96.4 F | WEIGHT: 265 LBS | OXYGEN SATURATION: 96 % | HEART RATE: 91 BPM

## 2022-11-30 DIAGNOSIS — R55 SYNCOPE, UNSPECIFIED SYNCOPE TYPE: ICD-10-CM

## 2022-11-30 DIAGNOSIS — R42 VERTIGO: Primary | ICD-10-CM

## 2022-11-30 PROCEDURE — G8417 CALC BMI ABV UP PARAM F/U: HCPCS | Performed by: FAMILY MEDICINE

## 2022-11-30 PROCEDURE — G8427 DOCREV CUR MEDS BY ELIG CLIN: HCPCS | Performed by: FAMILY MEDICINE

## 2022-11-30 PROCEDURE — 1036F TOBACCO NON-USER: CPT | Performed by: FAMILY MEDICINE

## 2022-11-30 PROCEDURE — 99213 OFFICE O/P EST LOW 20 MIN: CPT | Performed by: FAMILY MEDICINE

## 2022-11-30 PROCEDURE — G8484 FLU IMMUNIZE NO ADMIN: HCPCS | Performed by: FAMILY MEDICINE

## 2022-11-30 NOTE — ASSESSMENT & PLAN NOTE
I will refer to a GI doctor in the hope that they may be able to either diagnose or rule out the same problem that her acquaintance had. Her acquaintance had similar symptoms that do not appear to be any way related to the gastrointestinal tract but somehow an upper GI diagnosed the problem.

## 2022-11-30 NOTE — LETTER
[address]  [address] [facility] [address]  Phone: [phone]  Fax: [phone]    Lolita Mitchell MD        November 30, 2022     Patient: Michelle Read   YOB: 1995   Date of Visit: 11/30/2022       To Whom it May Concern:    Kathryn Read was seen in my clinic on 11/30/2022. She may return to work on 12/01/2022. Please excuse 11/26/2022, 11/29/2022, and 11/30/2022. She is in the process of getting evaluated by specialists. Please also excuse future unpredictable absences on future days the she wakes up with vertigo or passes out and is unable to work. If you have any questions or concerns, please don't hesitate to call.     Sincerely,         Lolita Mitchell MD

## 2022-11-30 NOTE — Clinical Note
[address]  [address] [facility] [address]  Phone: [phone]  Fax: [phone]    Vernadine Boeck, MD        November 30, 2022     Patient: Emiliano Bosworth Cost   YOB: 1995   Date of Visit: 11/30/2022       To Whom It May Concern: It is my medical opinion that Kathryn Cost {Work release (duty restriction):70686}. If you have any questions or concerns, please don't hesitate to call.     Sincerely,        Vernadine Boeck, MD

## 2022-11-30 NOTE — PROGRESS NOTES
11/30/22    Kathryn Cost  1995    Brennan Lundberg is a 32 y.o. female who presents today for evaluation of:  Chief Complaint   Patient presents with    Other     Passing out      Passing out : Friday morning she woke up with room spining. The same happened yesterday. She passed out 3 times yesterday and 2 times today. Her neurologist thinks it is vasovagal syncope. She will be seeing Dr Rina Tavarez. BS 130s when she passes out, 80 was lowest.     Her neurologist told her not to come back unless \"we\" can figure out another thing that could be causing it. She has been to a behavior health specialist as well. She has a pending appointment with a cardiologist.     She is frustrated that the best diagnosis she has been given so far is for syncope and that there is not a syncope cure. She has an acquaintance who had a similar problem for 6 months longer than she did and whose diagnosis was found with an upper GI. It was thought to be a rare kind of brain disorder that somehow was discovered and diagnosed with an upper GI. She has another acquaintance who had a similar problem that was found to be due to crystals in her ears. Review of Systems  She snores but has already been tested and found not to have sleep apnea. No Known Allergies     OBJECTIVE    /84 (Site: Right Upper Arm, Position: Sitting, Cuff Size: Large Adult)   Pulse 91   Temp (!) 96.4 °F (35.8 °C) (Infrared)   Ht 5' 7\" (1.702 m)   Wt 265 lb (120.2 kg)   SpO2 96%   BMI 41.50 kg/m²     Physical Exam   Constitutional:       General: Not in acute distress. Appearance: Normal appearance. Not ill-appearing. Eyes:      General: No scleral icterus. Cardiovascular:      Rate and Rhythm: Normal rate and regular rhythm. Heart sounds: No murmur heard. No friction rub. No gallop. Pulmonary:      Effort: Pulmonary effort is normal. No respiratory distress. Breath sounds: No wheezing, rhonchi or rales.    Abdominal: Palpations: Abdomen is soft. There is no mass. Tenderness: There is no abdominal tenderness. Musculoskeletal:     Moves all extremities normally. Skin:     General: Skin is warm. Coloration: Skin is not jaundiced. Neurological:      Mental Status: Patient is alert. Normal gait. Psychiatric:         Behavior: Behavior normal.         Thought Content: Thought content normal.         Judgment: Judgment normal.      ASSESSMENT/PLAN:    1. Vertigo  Assessment & Plan:   I will refer to physical therapy to evaluate and treat the possibility of vertigo caused by vestibular crystals. Orders:  -     Beverley Mena 647  2. Syncope, unspecified syncope type  Assessment & Plan:   I will refer to a GI doctor in the hope that they may be able to either diagnose or rule out the same problem that her acquaintance had. Her acquaintance had similar symptoms that do not appear to be any way related to the gastrointestinal tract but somehow an upper GI diagnosed the problem. Orders:  -     [id number] [facility], [facility]    Since she is trying to work and is likely to have unpredictable absences in the future I wrote today's work excuse note asking that it please also excuse future absences. Since I do not want to have to write a work excuse note every time she calls in the future stating that she missed work on a particular day and since I do not think that may be generating a work excuse note based on phone calls serves any useful purpose at all, I told her that if her boss wants me to I can print out 20 blank work excuse notes with a blank space for her to fill in the day. She understands that that would serve no useful purpose demonstrating that she is cognitively intact.     22 minutes were spent this calendar day in pre-charting and chart review; obtaining history, performing exam, medical decision making, and counseling patient/caregiver/family, completing orders and documentation, communicating with other health professional*, independently interpreting results and communicating results to patient/caregiver/family*, and care coordination*. *Items marked with asterisk not reported or billed separately. (Time for ECG interpretation or other separately billed interpretation not included in my total minutes.)      No follow-ups on file.    Gardiner Homans, MD

## 2022-11-30 NOTE — LETTER
[address]  [address] [facility] [address]  Phone: [phone]  Fax: [phone]    Makenna Panchal MD        November 30, 2022     Patient: Indiana Read   YOB: 1995   Date of Visit: 11/30/2022       To Whom it May Concern:    Kathryn Read was seen in my clinic on 11/30/2022. She may return to work on 12/01/2022. Please excuse 11/26/22, 11/29/22,  and 11/30/22. Please also excuse future absences that are unpredictable. She is working with 2 specialists and I entered referrals to 2 other specialists today. We are workign to try to figure out the problem. .    If you have any questions or concerns, please don't hesitate to call.     Sincerely,         Makenna Panchal MD

## 2022-11-30 NOTE — ASSESSMENT & PLAN NOTE
I will refer to physical therapy to evaluate and treat the possibility of vertigo caused by vestibular crystals.

## 2022-12-01 ENCOUNTER — TELEPHONE (OUTPATIENT)
Dept: GASTROENTEROLOGY | Age: 27
End: 2022-12-01

## 2022-12-01 ENCOUNTER — TELEPHONE (OUTPATIENT)
Dept: FAMILY MEDICINE CLINIC | Age: 27
End: 2022-12-01

## 2022-12-01 NOTE — LETTER
[address]  [address] [facility] [address]  Phone: [phone]  Fax: [phone]    Pebbles Cortes MD        December 1, 2022     Patient: Sallie Read   YOB: 1995   Date of Visit:  numerous dates       To Whom it May Concern:    Kathryn Read was seen in my clinic on 11/30/2022 and numerous prior dates. She stated she passed out and was unable to work on date or dates: ________________________. Please excuse the absence(s). If you have any questions or concerns, please don't hesitate to call.     Sincerely,         Pebbles Cortes MD

## 2022-12-01 NOTE — TELEPHONE ENCOUNTER
Patient called stating she passed out again today. She stated you told her if she needed a note you would write her one for today since she was here yesterday.

## 2022-12-06 ENCOUNTER — TELEPHONE (OUTPATIENT)
Dept: FAMILY MEDICINE CLINIC | Age: 27
End: 2022-12-06

## 2022-12-06 NOTE — TELEPHONE ENCOUNTER
Patient called asking for a head CT to find out why she is getting dizzy and passing out.     Please advise

## 2023-01-09 ENCOUNTER — OFFICE VISIT (OUTPATIENT)
Dept: FAMILY MEDICINE CLINIC | Age: 28
End: 2023-01-09
Payer: MEDICAID

## 2023-01-09 ENCOUNTER — OFFICE VISIT (OUTPATIENT)
Dept: GASTROENTEROLOGY | Age: 28
End: 2023-01-09
Payer: MEDICAID

## 2023-01-09 VITALS
SYSTOLIC BLOOD PRESSURE: 128 MMHG | TEMPERATURE: 97.2 F | HEART RATE: 74 BPM | WEIGHT: 268 LBS | HEIGHT: 67 IN | BODY MASS INDEX: 42.06 KG/M2 | DIASTOLIC BLOOD PRESSURE: 76 MMHG | OXYGEN SATURATION: 96 %

## 2023-01-09 VITALS
BODY MASS INDEX: 42.28 KG/M2 | TEMPERATURE: 97.5 F | HEART RATE: 89 BPM | OXYGEN SATURATION: 99 % | WEIGHT: 269.4 LBS | DIASTOLIC BLOOD PRESSURE: 72 MMHG | HEIGHT: 67 IN | SYSTOLIC BLOOD PRESSURE: 118 MMHG

## 2023-01-09 DIAGNOSIS — K59.01 SLOW TRANSIT CONSTIPATION: ICD-10-CM

## 2023-01-09 DIAGNOSIS — E66.01 MORBID OBESITY (HCC): ICD-10-CM

## 2023-01-09 DIAGNOSIS — R55 VASOVAGAL SYNCOPE: ICD-10-CM

## 2023-01-09 DIAGNOSIS — R79.82 CRP ELEVATED: ICD-10-CM

## 2023-01-09 DIAGNOSIS — K21.9 GASTROESOPHAGEAL REFLUX DISEASE, UNSPECIFIED WHETHER ESOPHAGITIS PRESENT: Primary | ICD-10-CM

## 2023-01-09 DIAGNOSIS — E03.9 ACQUIRED HYPOTHYROIDISM: Primary | ICD-10-CM

## 2023-01-09 PROCEDURE — G8417 CALC BMI ABV UP PARAM F/U: HCPCS | Performed by: FAMILY MEDICINE

## 2023-01-09 PROCEDURE — G8484 FLU IMMUNIZE NO ADMIN: HCPCS | Performed by: FAMILY MEDICINE

## 2023-01-09 PROCEDURE — 1036F TOBACCO NON-USER: CPT | Performed by: FAMILY MEDICINE

## 2023-01-09 PROCEDURE — 99204 OFFICE O/P NEW MOD 45 MIN: CPT | Performed by: NURSE PRACTITIONER

## 2023-01-09 PROCEDURE — G8417 CALC BMI ABV UP PARAM F/U: HCPCS | Performed by: NURSE PRACTITIONER

## 2023-01-09 PROCEDURE — 99214 OFFICE O/P EST MOD 30 MIN: CPT | Performed by: FAMILY MEDICINE

## 2023-01-09 PROCEDURE — 36415 COLL VENOUS BLD VENIPUNCTURE: CPT | Performed by: FAMILY MEDICINE

## 2023-01-09 PROCEDURE — G8427 DOCREV CUR MEDS BY ELIG CLIN: HCPCS | Performed by: NURSE PRACTITIONER

## 2023-01-09 PROCEDURE — G8484 FLU IMMUNIZE NO ADMIN: HCPCS | Performed by: NURSE PRACTITIONER

## 2023-01-09 PROCEDURE — G8427 DOCREV CUR MEDS BY ELIG CLIN: HCPCS | Performed by: FAMILY MEDICINE

## 2023-01-09 PROCEDURE — 1036F TOBACCO NON-USER: CPT | Performed by: NURSE PRACTITIONER

## 2023-01-09 RX ORDER — POLYETHYLENE GLYCOL 3350 17 G/17G
17 POWDER, FOR SOLUTION ORAL 2 TIMES DAILY
Qty: 1 EACH | Refills: 2 | Status: SHIPPED | OUTPATIENT
Start: 2023-01-09 | End: 2023-05-09

## 2023-01-09 SDOH — ECONOMIC STABILITY: FOOD INSECURITY: WITHIN THE PAST 12 MONTHS, YOU WORRIED THAT YOUR FOOD WOULD RUN OUT BEFORE YOU GOT MONEY TO BUY MORE.: NEVER TRUE

## 2023-01-09 SDOH — ECONOMIC STABILITY: FOOD INSECURITY: WITHIN THE PAST 12 MONTHS, THE FOOD YOU BOUGHT JUST DIDN'T LAST AND YOU DIDN'T HAVE MONEY TO GET MORE.: NEVER TRUE

## 2023-01-09 ASSESSMENT — ENCOUNTER SYMPTOMS
CONSTIPATION: 0
DIARRHEA: 0
COUGH: 0
WHEEZING: 0
ABDOMINAL PAIN: 0
SHORTNESS OF BREATH: 0
COLOR CHANGE: 0
PHOTOPHOBIA: 0
NAUSEA: 0
VOMITING: 0
EYE PAIN: 0
BLOOD IN STOOL: 0
BACK PAIN: 0

## 2023-01-09 ASSESSMENT — PATIENT HEALTH QUESTIONNAIRE - PHQ9
SUM OF ALL RESPONSES TO PHQ QUESTIONS 1-9: 0
SUM OF ALL RESPONSES TO PHQ QUESTIONS 1-9: 0
SUM OF ALL RESPONSES TO PHQ9 QUESTIONS 1 & 2: 0
2. FEELING DOWN, DEPRESSED OR HOPELESS: 0
SUM OF ALL RESPONSES TO PHQ QUESTIONS 1-9: 0
1. LITTLE INTEREST OR PLEASURE IN DOING THINGS: 0
SUM OF ALL RESPONSES TO PHQ QUESTIONS 1-9: 0

## 2023-01-09 ASSESSMENT — SOCIAL DETERMINANTS OF HEALTH (SDOH): HOW HARD IS IT FOR YOU TO PAY FOR THE VERY BASICS LIKE FOOD, HOUSING, MEDICAL CARE, AND HEATING?: NOT HARD AT ALL

## 2023-01-09 NOTE — ASSESSMENT & PLAN NOTE
Encouraged weight loss and doing some form of exercise. She seemed to make excuses for not exercising based on that it is cold outside and she does not have very much room in her house.

## 2023-01-09 NOTE — ASSESSMENT & PLAN NOTE
Check T4 and TSH and when we get the results back I plan to continue levothyroxine possibly at an adjusted dose

## 2023-01-09 NOTE — PROGRESS NOTES
1/9/23    Kathryn Cost  1995    Myles Cartwright is a 32 y.o. female who presents today for evaluation of:  Chief Complaint   Patient presents with    Follow-up     4 month follow up Hypothyroidism, syncope      Syncopal episodes : still having syncopal episodes about 3 times a week. She has enough of a forewarning that it has not been hazardous. Currently neurologist thinks vasovagal syncope is the likely kind. GM fought with syncope for many yrs. No irregular heartbeat with syncope. She does get chest pain and discussed that with a cardiologist. She had normal stress test. Hx of mild anemia yrs ago. She is cold a lot. She has had normal test for sleep apnea. Thyroid has been off since 2016. Hypothyroid : no heat or cold intolerance or feeling real tired. Obesity : she is exercising with a core ball. Review of Systems   Cardiovascular:  Positive for chest pain (only with an episode). Negative for palpitations and leg swelling. No Known Allergies     OBJECTIVE    /72 (Site: Right Upper Arm, Position: Sitting, Cuff Size: Large Adult)   Pulse 89   Temp 97.5 °F (36.4 °C) (Infrared)   Ht 5' 7\" (1.702 m)   Wt 269 lb 6.4 oz (122.2 kg)   SpO2 99%   BMI 42.19 kg/m²     Physical Exam   Constitutional:       General: Not in acute distress. Appearance: Normal appearance. Not ill-appearing. Eyes:      General: No scleral icterus. Cardiovascular:      Rate and Rhythm: Normal rate and regular rhythm. Heart sounds: No murmur heard. No friction rub. No gallop. Pulmonary:      Effort: Pulmonary effort is normal. No respiratory distress. Breath sounds: No wheezing, rhonchi or rales. Musculoskeletal:     Moves all extremities normally. Skin:     General: Skin is warm. Coloration: Skin is not jaundiced. Neurological:      Mental Status: Patient is alert. Psychiatric:         Behavior: Behavior normal.         Thought Content:  Thought content normal. Judgment: Judgment normal.          ASSESSMENT/PLAN:    1. Hypothyroidism, Acquired   Assessment & Plan:   Check T4 and TSH and when we get the results back I plan to continue levothyroxine possibly at an adjusted dose  Orders:  -     T4, Free  -     TSH  2. Vasovagal syncope  Assessment & Plan:   I will recheck a CBC since it has been 6 months since tested. Orders:  -     CBC with Auto Differential  3. CRP elevated  Assessment & Plan:   I will recheck a C-reactive protein and sed rate levels since they were both mildly elevated. Orders:  -     C-Reactive Protein  -     Sedimentation Rate  4. Class 3 obesity  Assessment & Plan:   Encouraged weight loss and doing some form of exercise. She seemed to make excuses for not exercising based on that it is cold outside and she does not have very much room in her house. Counseling provided for:  Healthy eating - Avoid sugar and other refined carbohydrates. and Eat more foods with fiber like vegetables and whole grain products.   >> Exercise - 1> try to do 150 minutes a week of exercise that is as hard as walking briskly (30 minutes 5 days a week or 22 minutes every day); and 2> do some strength training 2 or 3 times a week. Return in about 3 months (around 4/9/2023) for hypothyroid and syncope.    Dhruv Higgins MD

## 2023-01-09 NOTE — H&P (VIEW-ONLY)
Kathryn Read 32 y.o. female was seen by MARCIA Saldaña on 01/09/23     Wt Readings from Last 3 Encounters:   01/09/23 268 lb (121.6 kg)   01/09/23 269 lb 6.4 oz (122.2 kg)   11/30/22 265 lb (120.2 kg)       DANNY Read is a pleasant 32 y.o.  female who presents today for acid reflux and persistent syncope. She has a past medical history of GERD (gastroesophageal reflux disease), hypothyroidism, obesity, and slow transit constipation. She has never had an EGD. In the last two years she has been having dizziness and syncope occurring three times a week. MRI of brain was normal.  She was evaluated by neurologist.  Her appetite is good without early satiety. Her weight is stable. No nausea or vomiting. Her heartburn and acid reflux has been ongoing for years. She is not taking medication. No nocturnal awakenings with acid reflux. No dysphagia or pain with swallowing. No abdominal pain, bloating or distention. No excess belching or flatulence. She denies changes in her bowel pattern. Her typical bowel pattern is every three days with soft brown formed stools. Intermittent constipation for years; she has tried MOM and stool softeners. No diarrhea. No active bright bleeding rectal bleeding or melena. No family history of stomach or colon cancer. ROS  Review of Systems   Constitutional:  Negative for appetite change, chills, diaphoresis, fatigue, fever and unexpected weight change. HENT:  Negative for ear pain, hearing loss and tinnitus. Eyes:  Negative for photophobia, pain and visual disturbance. Respiratory:  Negative for cough, shortness of breath and wheezing. Cardiovascular:  Negative for chest pain, palpitations and leg swelling. Gastrointestinal:  Negative for abdominal pain, blood in stool, constipation, diarrhea, nausea and vomiting. Endocrine: Negative for cold intolerance, heat intolerance and polydipsia.    Genitourinary:  Negative for dysuria, frequency and urgency. Musculoskeletal:  Negative for back pain, myalgias and neck pain. Skin:  Negative for color change, pallor and rash. Allergic/Immunologic: Negative for environmental allergies and food allergies. Neurological:  Positive for dizziness and headaches. Negative for seizures and weakness. Hematological:  Bruises/bleeds easily. Psychiatric/Behavioral:  Positive for sleep disturbance. Negative for dysphoric mood and suicidal ideas. The patient is not nervous/anxious. Allergies  No Known Allergies    Medications  Current Outpatient Medications   Medication Sig Dispense Refill    levothyroxine (SYNTHROID) 125 MCG tablet Take 1 tablet by mouth daily 30 tablet 5     No current facility-administered medications for this visit. Past medical history:   She has a past medical history of GERD (gastroesophageal reflux disease), Hypothyroidism, Obesity, and Slow transit constipation. Past surgical history:  She has a past surgical history that includes [place] tooth extraction. Social History:  She reports that she has never smoked. She has never used smokeless tobacco. She reports that she does not drink alcohol and does not use drugs. Family history:  Her family history includes Anxiety Disorder in her half-brother; Asthma in her sister; Depression in her half-brother and mother; Diabetes in her maternal grandfather, maternal grandmother, mother, and paternal grandmother; Diabetes type 2  in her paternal grandmother; Heart Attack in her father; Heart Disease in her maternal grandmother; High Blood Pressure in her sister; High Cholesterol in her mother; Kidney Disease in her maternal grandmother; Migraines in her sister; No Known Problems in her half-sister, half-sister, half-sister, paternal grandfather, and son; Other in her father; Stroke in her paternal grandmother.     Objective    Vitals:    01/09/23 1049   BP: 128/76   Pulse: 74   Temp: 97.2 °F (36.2 °C)   SpO2: 96% Physical exam    Physical Exam  Vitals reviewed. Constitutional:       General: She is not in acute distress. Appearance: Normal appearance. She is well-developed. She is obese. She is not ill-appearing, toxic-appearing or diaphoretic. HENT:      Head: Normocephalic and atraumatic. Nose: Nose normal.      Mouth/Throat:      Mouth: Mucous membranes are moist.   Eyes:      Conjunctiva/sclera: Conjunctivae normal.      Pupils: Pupils are equal, round, and reactive to light. Neck:      Thyroid: No thyromegaly. Vascular: No JVD. Trachea: No tracheal deviation. Cardiovascular:      Rate and Rhythm: Normal rate and regular rhythm. Pulses: Normal pulses. Heart sounds: Normal heart sounds. No murmur heard. No friction rub. No gallop. Pulmonary:      Effort: Pulmonary effort is normal. No respiratory distress. Breath sounds: Normal breath sounds. No stridor. No wheezing, rhonchi or rales. Chest:      Chest wall: No tenderness. Abdominal:      General: Bowel sounds are normal. There is no distension. Palpations: Abdomen is soft. There is no mass. Tenderness: There is no abdominal tenderness. There is no guarding or rebound. Hernia: No hernia is present. Musculoskeletal:         General: Normal range of motion. Cervical back: Normal range of motion and neck supple. Lymphadenopathy:      Cervical: No cervical adenopathy. Skin:     General: Skin is warm and dry. Neurological:      Mental Status: She is alert and oriented to person, place, and time. Psychiatric:         Mood and Affect: Mood normal.       No visits with results within 2 Month(s) from this visit.    Latest known visit with results is:   Office Visit on 08/12/2022   Component Date Value Ref Range Status    Sodium 08/12/2022 137  136 - 145 mmol/L Final    Potassium 08/12/2022 4.5  3.5 - 5.1 mmol/L Final    Chloride 08/12/2022 103  99 - 110 mmol/L Final    CO2 08/12/2022 19 (A)  21 - 32 mmol/L Final    Anion Gap 2022 15  3 - 16 Final    Glucose 2022 102 (A)  70 - 99 mg/dL Final    BUN 2022 12  7 - 20 mg/dL Final    Creatinine 2022 0.8  0.6 - 1.1 mg/dL Final    GFR Non- 2022 >60  >60 Final    Comment: >60 mL/min/1.73m2 EGFR, calc. for ages 25 and older using the  MDRD formula (not corrected for weight), is valid for stable  renal function. GFR  2022 >60  >60 Final    Comment: Chronic Kidney Disease: less than 60 ml/min/1.73 sq.m. Kidney Failure: less than 15 ml/min/1.73 sq.m. Results valid for patients 18 years and older. Calcium 2022 9.7  8.3 - 10.6 mg/dL Final    Total Protein 2022 7.0  6.4 - 8.2 g/dL Final    Albumin 2022 4.3  3.4 - 5.0 g/dL Final    Albumin/Globulin Ratio 2022 1.6  1.1 - 2.2 Final    Total Bilirubin 2022 0.4  0.0 - 1.0 mg/dL Final    Alkaline Phosphatase 2022 87  40 - 129 U/L Final    ALT 2022 12  10 - 40 U/L Final    AST 2022 15  15 - 37 U/L Final    TSH Reflex FT4 2022 3.92  0.27 - 4.20 uIU/mL Final    T4 Free 2022 1.5  0.9 - 1.8 ng/dL Final    Lipase 2022 22.0  13.0 - 60.0 U/L Final    CRP 2022 9.7 (A)  0.0 - 5.1 mg/L Final    Comment: WR-CRP Reference range:  30D-199Y    <5.1  <30D        Not established    CRP is used in the detection and evaluation of infection,  tissue injury, inflammatory disorders, and associated  disease. Increases in CRP values are non-specific and  should not be interpreted without a complete clinical  evaluation.  reference ranges have not been  established and values should be interpreted within clinical  context and with serial measurements, if clinically  appropriate. Sed Rate 2022 24 (A)  0 - 20 mm/Hr Final       Assessment and Plan:  1. Will plan for a EGD with MAC anesthesia. The patient was informed of the risks and benefits of the procedure.   Referred by PCP in hope that they EGD may be able to either diagnose or rule out the same problem that her acquaintance (rare kind of brain disorder). Her acquaintance had similar symptoms that do not appear to be any way related to the gastrointestinal tract but somehow an upper GI diagnosed the problem. 2.  GERD without dysphagia or odynophagia. She did not want to start PPI at this time will hold off for now. The patient was encouraged to continue with anti-reflux measures. The patient was provided with information on GERD. 3.  Slow transit constipation will order Miralax 17 grams take twice daily for treatment; recommend take with stool softeners. The patient was encouraged to increase her fruit, fiber and fluids. Recommend avoidance of constipating foods. The patient was provided with information on constipation. 4.  Further recommendations for follow-up will be determined after the EGD has been completed.

## 2023-01-09 NOTE — PROGRESS NOTES
Kathryn Read 32 y.o. female was seen by MARCIA Chi on 01/09/23     Wt Readings from Last 3 Encounters:   01/09/23 268 lb (121.6 kg)   01/09/23 269 lb 6.4 oz (122.2 kg)   11/30/22 265 lb (120.2 kg)       DANNY Read is a pleasant 32 y.o.  female who presents today for acid reflux and persistent syncope. She has a past medical history of GERD (gastroesophageal reflux disease), hypothyroidism, obesity, and slow transit constipation. She has never had an EGD. In the last two years she has been having dizziness and syncope occurring three times a week. MRI of brain was normal.  She was evaluated by neurologist.  Her appetite is good without early satiety. Her weight is stable. No nausea or vomiting. Her heartburn and acid reflux has been ongoing for years. She is not taking medication. No nocturnal awakenings with acid reflux. No dysphagia or pain with swallowing. No abdominal pain, bloating or distention. No excess belching or flatulence. She denies changes in her bowel pattern. Her typical bowel pattern is every three days with soft brown formed stools. Intermittent constipation for years; she has tried MOM and stool softeners. No diarrhea. No active bright bleeding rectal bleeding or melena. No family history of stomach or colon cancer. ROS  Review of Systems   Constitutional:  Negative for appetite change, chills, diaphoresis, fatigue, fever and unexpected weight change. HENT:  Negative for ear pain, hearing loss and tinnitus. Eyes:  Negative for photophobia, pain and visual disturbance. Respiratory:  Negative for cough, shortness of breath and wheezing. Cardiovascular:  Negative for chest pain, palpitations and leg swelling. Gastrointestinal:  Negative for abdominal pain, blood in stool, constipation, diarrhea, nausea and vomiting. Endocrine: Negative for cold intolerance, heat intolerance and polydipsia.    Genitourinary:  Negative for dysuria, frequency and urgency. Musculoskeletal:  Negative for back pain, myalgias and neck pain. Skin:  Negative for color change, pallor and rash. Allergic/Immunologic: Negative for environmental allergies and food allergies. Neurological:  Positive for dizziness and headaches. Negative for seizures and weakness. Hematological:  Bruises/bleeds easily. Psychiatric/Behavioral:  Positive for sleep disturbance. Negative for dysphoric mood and suicidal ideas. The patient is not nervous/anxious. Allergies  No Known Allergies    Medications  Current Outpatient Medications   Medication Sig Dispense Refill    levothyroxine (SYNTHROID) 125 MCG tablet Take 1 tablet by mouth daily 30 tablet 5     No current facility-administered medications for this visit. Past medical history:   She has a past medical history of GERD (gastroesophageal reflux disease), Hypothyroidism, Obesity, and Slow transit constipation. Past surgical history:  She has a past surgical history that includes [place] tooth extraction. Social History:  She reports that she has never smoked. She has never used smokeless tobacco. She reports that she does not drink alcohol and does not use drugs. Family history:  Her family history includes Anxiety Disorder in her half-brother; Asthma in her sister; Depression in her half-brother and mother; Diabetes in her maternal grandfather, maternal grandmother, mother, and paternal grandmother; Diabetes type 2  in her paternal grandmother; Heart Attack in her father; Heart Disease in her maternal grandmother; High Blood Pressure in her sister; High Cholesterol in her mother; Kidney Disease in her maternal grandmother; Migraines in her sister; No Known Problems in her half-sister, half-sister, half-sister, paternal grandfather, and son; Other in her father; Stroke in her paternal grandmother.     Objective    Vitals:    01/09/23 1049   BP: 128/76   Pulse: 74   Temp: 97.2 °F (36.2 °C)   SpO2: 96% Physical exam    Physical Exam  Vitals reviewed. Constitutional:       General: She is not in acute distress. Appearance: Normal appearance. She is well-developed. She is obese. She is not ill-appearing, toxic-appearing or diaphoretic. HENT:      Head: Normocephalic and atraumatic. Nose: Nose normal.      Mouth/Throat:      Mouth: Mucous membranes are moist.   Eyes:      Conjunctiva/sclera: Conjunctivae normal.      Pupils: Pupils are equal, round, and reactive to light. Neck:      Thyroid: No thyromegaly. Vascular: No JVD. Trachea: No tracheal deviation. Cardiovascular:      Rate and Rhythm: Normal rate and regular rhythm. Pulses: Normal pulses. Heart sounds: Normal heart sounds. No murmur heard. No friction rub. No gallop. Pulmonary:      Effort: Pulmonary effort is normal. No respiratory distress. Breath sounds: Normal breath sounds. No stridor. No wheezing, rhonchi or rales. Chest:      Chest wall: No tenderness. Abdominal:      General: Bowel sounds are normal. There is no distension. Palpations: Abdomen is soft. There is no mass. Tenderness: There is no abdominal tenderness. There is no guarding or rebound. Hernia: No hernia is present. Musculoskeletal:         General: Normal range of motion. Cervical back: Normal range of motion and neck supple. Lymphadenopathy:      Cervical: No cervical adenopathy. Skin:     General: Skin is warm and dry. Neurological:      Mental Status: She is alert and oriented to person, place, and time. Psychiatric:         Mood and Affect: Mood normal.       No visits with results within 2 Month(s) from this visit.    Latest known visit with results is:   Office Visit on 08/12/2022   Component Date Value Ref Range Status    Sodium 08/12/2022 137  136 - 145 mmol/L Final    Potassium 08/12/2022 4.5  3.5 - 5.1 mmol/L Final    Chloride 08/12/2022 103  99 - 110 mmol/L Final    CO2 08/12/2022 19 (A)  21 - 32 mmol/L Final    Anion Gap 2022 15  3 - 16 Final    Glucose 2022 102 (A)  70 - 99 mg/dL Final    BUN 2022 12  7 - 20 mg/dL Final    Creatinine 2022 0.8  0.6 - 1.1 mg/dL Final    GFR Non- 2022 >60  >60 Final    Comment: >60 mL/min/1.73m2 EGFR, calc. for ages 25 and older using the  MDRD formula (not corrected for weight), is valid for stable  renal function. GFR  2022 >60  >60 Final    Comment: Chronic Kidney Disease: less than 60 ml/min/1.73 sq.m. Kidney Failure: less than 15 ml/min/1.73 sq.m. Results valid for patients 18 years and older. Calcium 2022 9.7  8.3 - 10.6 mg/dL Final    Total Protein 2022 7.0  6.4 - 8.2 g/dL Final    Albumin 2022 4.3  3.4 - 5.0 g/dL Final    Albumin/Globulin Ratio 2022 1.6  1.1 - 2.2 Final    Total Bilirubin 2022 0.4  0.0 - 1.0 mg/dL Final    Alkaline Phosphatase 2022 87  40 - 129 U/L Final    ALT 2022 12  10 - 40 U/L Final    AST 2022 15  15 - 37 U/L Final    TSH Reflex FT4 2022 3.92  0.27 - 4.20 uIU/mL Final    T4 Free 2022 1.5  0.9 - 1.8 ng/dL Final    Lipase 2022 22.0  13.0 - 60.0 U/L Final    CRP 2022 9.7 (A)  0.0 - 5.1 mg/L Final    Comment: WR-CRP Reference range:  30D-199Y    <5.1  <30D        Not established    CRP is used in the detection and evaluation of infection,  tissue injury, inflammatory disorders, and associated  disease. Increases in CRP values are non-specific and  should not be interpreted without a complete clinical  evaluation.  reference ranges have not been  established and values should be interpreted within clinical  context and with serial measurements, if clinically  appropriate. Sed Rate 2022 24 (A)  0 - 20 mm/Hr Final       Assessment and Plan:  1. Will plan for a EGD with MAC anesthesia. The patient was informed of the risks and benefits of the procedure.   Referred by PCP in hope that they EGD may be able to either diagnose or rule out the same problem that her acquaintance (rare kind of brain disorder). Her acquaintance had similar symptoms that do not appear to be any way related to the gastrointestinal tract but somehow an upper GI diagnosed the problem. 2.  GERD without dysphagia or odynophagia. She did not want to start PPI at this time will hold off for now. The patient was encouraged to continue with anti-reflux measures. The patient was provided with information on GERD. 3.  Slow transit constipation will order Miralax 17 grams take twice daily for treatment; recommend take with stool softeners. The patient was encouraged to increase her fruit, fiber and fluids. Recommend avoidance of constipating foods. The patient was provided with information on constipation. 4.  Further recommendations for follow-up will be determined after the EGD has been completed.

## 2023-01-10 DIAGNOSIS — E03.9 ACQUIRED HYPOTHYROIDISM: ICD-10-CM

## 2023-01-10 LAB
BASOPHILS ABSOLUTE: 0 K/UL (ref 0–0.2)
BASOPHILS RELATIVE PERCENT: 0.6 %
C-REACTIVE PROTEIN: 8.8 MG/L (ref 0–5.1)
EOSINOPHILS ABSOLUTE: 0.1 K/UL (ref 0–0.6)
EOSINOPHILS RELATIVE PERCENT: 2 %
HCT VFR BLD CALC: 40 % (ref 36–48)
HEMOGLOBIN: 13.1 G/DL (ref 12–16)
LYMPHOCYTES ABSOLUTE: 1.5 K/UL (ref 1–5.1)
LYMPHOCYTES RELATIVE PERCENT: 28.1 %
MCH RBC QN AUTO: 29.7 PG (ref 26–34)
MCHC RBC AUTO-ENTMCNC: 32.7 G/DL (ref 31–36)
MCV RBC AUTO: 90.7 FL (ref 80–100)
MONOCYTES ABSOLUTE: 0.4 K/UL (ref 0–1.3)
MONOCYTES RELATIVE PERCENT: 7.5 %
NEUTROPHILS ABSOLUTE: 3.2 K/UL (ref 1.7–7.7)
NEUTROPHILS RELATIVE PERCENT: 61.8 %
PDW BLD-RTO: 13.8 % (ref 12.4–15.4)
PLATELET # BLD: 406 K/UL (ref 135–450)
PMV BLD AUTO: 8.1 FL (ref 5–10.5)
RBC # BLD: 4.4 M/UL (ref 4–5.2)
SEDIMENTATION RATE, ERYTHROCYTE: 24 MM/HR (ref 0–20)
T4 FREE: 1.6 NG/DL (ref 0.9–1.8)
TSH SERPL DL<=0.05 MIU/L-ACNC: 0.26 UIU/ML (ref 0.27–4.2)
WBC # BLD: 5.3 K/UL (ref 4–11)

## 2023-01-10 RX ORDER — LEVOTHYROXINE SODIUM 112 UG/1
112 TABLET ORAL DAILY
Qty: 90 TABLET | Refills: 0 | Status: SHIPPED | OUTPATIENT
Start: 2023-01-10

## 2023-01-10 NOTE — PROGRESS NOTES
Patient will be called with an arrival time on 1/11/2023 for her procedure at [facility] on 1/12/2023               1. Do not eat or drink anything after midnight - unless instructed by your doctor prior to surgery. This includes                   no water, chewing gum or mints. 2. Follow your directions as prescribed by the doctor for your procedure and medications. 3. Check with your Doctor regarding stopping vitamins, supplements, blood thinners and follow their instructions. Stop vitamins, supplements and NSAIDS:    4. Do not smoke, vape or use chewing tobacco morning of surgery. Do not drink any alcoholic beverages 24 hours prior to surgery. This includes NA Beer. No street drugs 7 days prior to surgery. 5. You may brush your teeth and gargle the morning of surgery. DO NOT SWALLOW WATER   6. You MUST make arrangements for a responsible adult to take you home after your surgery and be able to check on you every couple                   hours for the day. You will not be allowed to leave alone or drive yourself home. It is strongly suggested someone stay with you the first 24                   hrs. Your surgery will be cancelled if you do not have a ride home. 7. Please wear simple, loose fitting clothing to the hospital.  Annette Steele not bring valuables (money, credit cards, checkbooks, etc.) Do not wear any                   makeup (including no eye makeup) or nail polish on your fingers or toes. 8. DO NOT wear any jewelry or piercings on day of surgery. All body piercing jewelry must be removed. 9. If you have dentures, they will be removed before going to the OR; we will provide you a container. If you wear contact lenses or glasses,                  they will be removed; please bring a case for them. 10. If you  have a Living Will and Durable Power of  for Healthcare, please bring in a copy.            11. Please bring picture ID,  insurance card, paperwork from the doctors office    (H & P, Consent, & card for implantable devices). 12. Take a shower with Hibiclens or an antibacterial soap the night before your surgery (put clean sheets on your bed). Take a 2nd shower with the antibacterial soap the morning of surgery. Do not apply any make-up, deodorant, lotion, oil or powder. 15.  Enter thru the main entrance on the day of surgery. Patient will take her synthroid the morning of her procedure, she has no questions concerning egd instructions at this time.

## 2023-01-11 ENCOUNTER — ANESTHESIA EVENT (OUTPATIENT)
Dept: ENDOSCOPY | Age: 28
End: 2023-01-11
Payer: MEDICAID

## 2023-01-11 NOTE — PROGRESS NOTES
Spoke with patient and she will arrive at 1100 at [facility] on 1/12/2023 for her procedure at 1230. IV order in epic, placed by Greta LAU.

## 2023-01-11 NOTE — ANESTHESIA PRE PROCEDURE
Department of Anesthesiology  Preprocedure Note       Name:  Henry Amado   Age:  32 y.o.  :  1995                                          MRN:  [id number]         Date:  2023      Surgeon: Jacob Jackson):  Mary Nguyen MD    Procedure: Procedure(s):  EGD ESOPHAGOGASTRODUODENOSCOPY    Medications prior to admission:   Prior to Admission medications    Medication Sig Start Date End Date Taking? Authorizing Provider   levothyroxine (SYNTHROID) 112 MCG tablet Take 1 tablet by mouth daily 1/10/23   Lonnie Tineo MD   polyethylene glycol [facility]) 17 GM/SCOOP powder Take 17 g by mouth 2 times daily 23  HUI Wagner - CNP       Current medications:    No current facility-administered medications for this encounter.      Current Outpatient Medications   Medication Sig Dispense Refill    levothyroxine (SYNTHROID) 112 MCG tablet Take 1 tablet by mouth daily 90 tablet 0    polyethylene glycol (MIRALAX) 17 GM/SCOOP powder Take 17 g by mouth 2 times daily 1 each 2       Allergies:  No Known Allergies    Problem List:    Patient Active Problem List   Diagnosis Code    Class 3 obesity E66.01    Hypothyroidism, Acquired  E03.9    History of iron deficiency anemia Z86.2    Sprain of ankle S93.409A    Syncope R55    Snoring R06.83    Sleep disturbances G47.9    Sleep state misperception F51.02    Hypersomnia G47.10    Secondary amenorrhea N91.1    Nausea R11.0    Miscarriage O03.9    CRP elevated R79.82    Generalized abdominal pain R10.84    Vertigo R42       Past Medical History:        Diagnosis Date    GERD (gastroesophageal reflux disease)     Hypothyroidism 2017    Obesity     Slow transit constipation        Past Surgical History:        Procedure Laterality Date    WISDOM TOOTH EXTRACTION         Social History:    Social History     Tobacco Use    Smoking status: Never    Smokeless tobacco: Never   Substance Use Topics    Alcohol use: Yes     Comment: rare Counseling given: Not Answered      Vital Signs (Current):   Vitals:    01/10/23 0903   Weight: 268 lb (121.6 kg)   Height: 5' 7\" (1.702 m)                                              BP Readings from Last 3 Encounters:   01/09/23 128/76   01/09/23 118/72   11/30/22 130/84       NPO Status:                                                                                 BMI:   Wt Readings from Last 3 Encounters:   01/10/23 268 lb (121.6 kg)   01/09/23 268 lb (121.6 kg)   01/09/23 269 lb 6.4 oz (122.2 kg)     Body mass index is 41.97 kg/m². CBC:   Lab Results   Component Value Date/Time    WBC 5.3 01/09/2023 07:33 AM    RBC 4.40 01/09/2023 07:33 AM    HGB 13.1 01/09/2023 07:33 AM    HCT 40.0 01/09/2023 07:33 AM    MCV 90.7 01/09/2023 07:33 AM    RDW 13.8 01/09/2023 07:33 AM     01/09/2023 07:33 AM       CMP:   Lab Results   Component Value Date/Time     08/12/2022 07:54 AM    K 4.5 08/12/2022 07:54 AM     08/12/2022 07:54 AM    CO2 19 08/12/2022 07:54 AM    BUN 12 08/12/2022 07:54 AM    CREATININE 0.8 08/12/2022 07:54 AM    GFRAA >60 08/12/2022 07:54 AM    AGRATIO 1.6 08/12/2022 07:54 AM    LABGLOM >60 08/12/2022 07:54 AM    GLUCOSE 102 08/12/2022 07:54 AM    PROT 7.0 08/12/2022 07:54 AM    CALCIUM 9.7 08/12/2022 07:54 AM    BILITOT 0.4 08/12/2022 07:54 AM    ALKPHOS 87 08/12/2022 07:54 AM    AST 15 08/12/2022 07:54 AM    ALT 12 08/12/2022 07:54 AM       POC Tests: No results for input(s): POCGLU, POCNA, POCK, POCCL, POCBUN, POCHEMO, POCHCT in the last 72 hours.     Coags: No results found for: PROTIME, INR, APTT    HCG (If Applicable):   Lab Results   Component Value Date    PREGTESTUR negative 09/29/2021        ABGs: No results found for: PHART, PO2ART, PQB7MUQ, YZO6EQD, BEART, V4FWLNOM     Type & Screen (If Applicable):  No results found for: LABABO, LABRH    Drug/Infectious Status (If Applicable):  No results found for: HIV, HEPCAB    COVID-19 Screening (If Applicable): Lab Results   Component Value Date/Time    COVID19 DETECTED 08/13/2020 09:26 AM           Anesthesia Evaluation  Patient summary reviewed  Airway: Mallampati: II  TM distance: >3 FB   Neck ROM: full  Mouth opening: > = 3 FB   Dental: normal exam         Pulmonary:normal exam                              ROS comment: Snoring   Cardiovascular:  Exercise tolerance: good (>4 METS),            Beta Blocker:  Not on Beta Blocker      ROS comment: Echo 12/2021:  Summary   Left ventricular function is normal, EF 55-60%. No significant valvular disease noted. Normal pulmonary artery pressure, RVSP 33 mmHg. No evidence of pericardial effusion. Stress test 12/2021:   Summary   Reduced exercise performance without angina and ischemic EKG changes. Neuro/Psych:   Negative Neuro/Psych ROS              GI/Hepatic/Renal:   (+) GERD:, morbid obesity          Endo/Other:    (+) hypothyroidism::., .                 Abdominal:             Vascular: negative vascular ROS. Other Findings:           Anesthesia Plan      MAC     ASA 2       Induction: intravenous. Anesthetic plan and risks discussed with patient and mother. Plan discussed with CRNA. HUI Varma - CRNA   1/11/2023         Pre Anesthesia Assessment complete.  Chart reviewed on 1/11/2023

## 2023-01-12 ENCOUNTER — ANESTHESIA (OUTPATIENT)
Dept: ENDOSCOPY | Age: 28
End: 2023-01-12
Payer: MEDICAID

## 2023-01-12 ENCOUNTER — HOSPITAL ENCOUNTER (OUTPATIENT)
Age: 28
Setting detail: OUTPATIENT SURGERY
Discharge: HOME | End: 2023-01-12
Attending: INTERNAL MEDICINE | Admitting: INTERNAL MEDICINE
Payer: MEDICAID

## 2023-01-12 VITALS
SYSTOLIC BLOOD PRESSURE: 109 MMHG | HEIGHT: 67 IN | DIASTOLIC BLOOD PRESSURE: 93 MMHG | WEIGHT: 268 LBS | BODY MASS INDEX: 42.06 KG/M2 | HEART RATE: 102 BPM | OXYGEN SATURATION: 98 % | TEMPERATURE: 98 F | RESPIRATION RATE: 16 BRPM

## 2023-01-12 DIAGNOSIS — K21.9 GASTROESOPHAGEAL REFLUX DISEASE, UNSPECIFIED WHETHER ESOPHAGITIS PRESENT: ICD-10-CM

## 2023-01-12 LAB — PREGNANCY TEST URINE, POC: NEGATIVE

## 2023-01-12 PROCEDURE — 6360000002 HC RX W HCPCS: Performed by: NURSE ANESTHETIST, CERTIFIED REGISTERED

## 2023-01-12 PROCEDURE — 7100000010 HC PHASE II RECOVERY - FIRST 15 MIN: Performed by: INTERNAL MEDICINE

## 2023-01-12 PROCEDURE — 3700000000 HC ANESTHESIA ATTENDED CARE: Performed by: INTERNAL MEDICINE

## 2023-01-12 PROCEDURE — 88342 IMHCHEM/IMCYTCHM 1ST ANTB: CPT

## 2023-01-12 PROCEDURE — 81025 URINE PREGNANCY TEST: CPT

## 2023-01-12 PROCEDURE — 3609017100 HC EGD: Performed by: INTERNAL MEDICINE

## 2023-01-12 PROCEDURE — 43239 EGD BIOPSY SINGLE/MULTIPLE: CPT | Performed by: INTERNAL MEDICINE

## 2023-01-12 PROCEDURE — 3609012400 HC EGD TRANSORAL BIOPSY SINGLE/MULTIPLE: Performed by: INTERNAL MEDICINE

## 2023-01-12 PROCEDURE — 2500000003 HC RX 250 WO HCPCS: Performed by: NURSE ANESTHETIST, CERTIFIED REGISTERED

## 2023-01-12 PROCEDURE — 2580000003 HC RX 258: Performed by: ANESTHESIOLOGY

## 2023-01-12 PROCEDURE — 2709999900 HC NON-CHARGEABLE SUPPLY: Performed by: INTERNAL MEDICINE

## 2023-01-12 PROCEDURE — 6360000002 HC RX W HCPCS

## 2023-01-12 PROCEDURE — 88305 TISSUE EXAM BY PATHOLOGIST: CPT

## 2023-01-12 PROCEDURE — 3700000001 HC ADD 15 MINUTES (ANESTHESIA): Performed by: INTERNAL MEDICINE

## 2023-01-12 PROCEDURE — 7100000011 HC PHASE II RECOVERY - ADDTL 15 MIN: Performed by: INTERNAL MEDICINE

## 2023-01-12 RX ORDER — LIDOCAINE HYDROCHLORIDE 20 MG/ML
INJECTION, SOLUTION INFILTRATION; PERINEURAL PRN
Status: DISCONTINUED | OUTPATIENT
Start: 2023-01-12 | End: 2023-01-12 | Stop reason: SDUPTHER

## 2023-01-12 RX ORDER — PROPOFOL 10 MG/ML
INJECTION, EMULSION INTRAVENOUS PRN
Status: DISCONTINUED | OUTPATIENT
Start: 2023-01-12 | End: 2023-01-12 | Stop reason: SDUPTHER

## 2023-01-12 RX ORDER — SODIUM CHLORIDE, SODIUM LACTATE, POTASSIUM CHLORIDE, CALCIUM CHLORIDE 600; 310; 30; 20 MG/100ML; MG/100ML; MG/100ML; MG/100ML
INJECTION, SOLUTION INTRAVENOUS CONTINUOUS
Status: DISCONTINUED | OUTPATIENT
Start: 2023-01-12 | End: 2023-01-12 | Stop reason: HOSPADM

## 2023-01-12 RX ORDER — MIDAZOLAM HYDROCHLORIDE 1 MG/ML
INJECTION INTRAMUSCULAR; INTRAVENOUS PRN
Status: DISCONTINUED | OUTPATIENT
Start: 2023-01-12 | End: 2023-01-12 | Stop reason: SDUPTHER

## 2023-01-12 RX ADMIN — MIDAZOLAM 2 MG: 1 INJECTION INTRAMUSCULAR; INTRAVENOUS at 11:50

## 2023-01-12 RX ADMIN — SODIUM CHLORIDE, POTASSIUM CHLORIDE, SODIUM LACTATE AND CALCIUM CHLORIDE: 600; 310; 30; 20 INJECTION, SOLUTION INTRAVENOUS at 11:51

## 2023-01-12 RX ADMIN — LIDOCAINE HYDROCHLORIDE 100 MG: 20 INJECTION, SOLUTION INFILTRATION; PERINEURAL at 12:01

## 2023-01-12 RX ADMIN — PROPOFOL 300 MG: 10 INJECTION, EMULSION INTRAVENOUS at 12:00

## 2023-01-12 ASSESSMENT — PAIN SCALES - GENERAL
PAINLEVEL_OUTOF10: 0
PAINLEVEL_OUTOF10: 0

## 2023-01-12 NOTE — PROGRESS NOTES
Pt returned to room from endo    Report received from [facility]  Pt A&O, call light placed within reach, side rails up x's 2, pt given pepsi to drink  1232 Dr Tim Gonzalez in room to speak to pt and friend  [phone] Discharge instructions given, voiced understanding  1300 Pt escorted to main entrance via wheelchair for discharge.

## 2023-01-12 NOTE — ANESTHESIA POSTPROCEDURE EVALUATION
Department of Anesthesiology  Postprocedure Note    Patient: Rogelio Client Cost  MRN: [id number]  YOB: 1995  Date of evaluation: 1/12/2023      Procedure Summary     Date: 01/12/23 Room / Location: [address]    Anesthesia Start: 1150 Anesthesia Stop: 1216    Procedure: EGD ESOPHAGOGASTRODUODENOSCOPY Diagnosis:       Gastroesophageal reflux disease, unspecified whether esophagitis present      (Gastroesophageal reflux disease, unspecified whether esophagitis present [K21.9])    Surgeons:  Clarence Prater MD Responsible Provider: Marino Kaufman MD    Anesthesia Type: MAC ASA Status: 2          Anesthesia Type: MAC    Miki Phase I: Miki Score: 10    Miki Phase II:        Anesthesia Post Evaluation    Patient location during evaluation: bedside  Patient participation: complete - patient participated  Level of consciousness: awake and alert  Pain score: 0  Airway patency: patent  Nausea & Vomiting: no nausea and no vomiting  Complications: no  Cardiovascular status: blood pressure returned to baseline and hemodynamically stable  Respiratory status: acceptable, room air and spontaneous ventilation  Hydration status: euvolemic

## 2023-01-12 NOTE — PROGRESS NOTES
[facility] Patient alert and oriented. Verified patient's name, , allergies and procedure. No beta blockers, no blood thinners, no implants. H&P on chart. Roselind Blanks at bedside.

## 2023-01-16 NOTE — RESULT ENCOUNTER NOTE
Please call the patient and have them set up a follow-up appointment. She has H. pylori and will need treatment.

## 2023-02-08 ENCOUNTER — OFFICE VISIT (OUTPATIENT)
Dept: GASTROENTEROLOGY | Age: 28
End: 2023-02-08
Payer: MEDICAID

## 2023-02-08 VITALS
SYSTOLIC BLOOD PRESSURE: 124 MMHG | WEIGHT: 277.6 LBS | BODY MASS INDEX: 43.57 KG/M2 | HEART RATE: 112 BPM | TEMPERATURE: 97.9 F | DIASTOLIC BLOOD PRESSURE: 66 MMHG | OXYGEN SATURATION: 98 % | HEIGHT: 67 IN

## 2023-02-08 DIAGNOSIS — B96.81 HELICOBACTER PYLORI GASTRITIS: Primary | ICD-10-CM

## 2023-02-08 DIAGNOSIS — K29.70 HELICOBACTER PYLORI GASTRITIS: Primary | ICD-10-CM

## 2023-02-08 DIAGNOSIS — K21.9 GASTROESOPHAGEAL REFLUX DISEASE, UNSPECIFIED WHETHER ESOPHAGITIS PRESENT: ICD-10-CM

## 2023-02-08 DIAGNOSIS — K59.01 SLOW TRANSIT CONSTIPATION: ICD-10-CM

## 2023-02-08 PROCEDURE — G8427 DOCREV CUR MEDS BY ELIG CLIN: HCPCS | Performed by: INTERNAL MEDICINE

## 2023-02-08 PROCEDURE — G8417 CALC BMI ABV UP PARAM F/U: HCPCS | Performed by: INTERNAL MEDICINE

## 2023-02-08 PROCEDURE — G8484 FLU IMMUNIZE NO ADMIN: HCPCS | Performed by: INTERNAL MEDICINE

## 2023-02-08 PROCEDURE — 1036F TOBACCO NON-USER: CPT | Performed by: INTERNAL MEDICINE

## 2023-02-08 PROCEDURE — 99214 OFFICE O/P EST MOD 30 MIN: CPT | Performed by: INTERNAL MEDICINE

## 2023-02-08 RX ORDER — OMEPRAZOLE 20 MG/1
CAPSULE, DELAYED RELEASE ORAL
Qty: 42 CAPSULE | Refills: 0 | Status: SHIPPED | OUTPATIENT
Start: 2023-02-08 | End: 2023-03-08

## 2023-02-08 RX ORDER — CLARITHROMYCIN 500 MG/1
500 TABLET, COATED ORAL 2 TIMES DAILY
Qty: 28 TABLET | Refills: 0 | Status: SHIPPED | OUTPATIENT
Start: 2023-02-08 | End: 2023-02-22

## 2023-02-08 RX ORDER — METRONIDAZOLE 500 MG/1
500 TABLET ORAL 2 TIMES DAILY
Qty: 28 TABLET | Refills: 0 | Status: SHIPPED | OUTPATIENT
Start: 2023-02-08 | End: 2023-02-22

## 2023-02-08 RX ORDER — AMOXICILLIN 500 MG/1
1000 CAPSULE ORAL 2 TIMES DAILY
Qty: 56 CAPSULE | Refills: 0 | Status: SHIPPED | OUTPATIENT
Start: 2023-02-08 | End: 2023-02-22

## 2023-02-08 NOTE — PROGRESS NOTES
[facility]             MD Gisella Gupta Aas, MD             Scott Putnamoniel, APRN-CNP             [address] [facility] [place], [address]             [phone] fax [phone]        Gastroenterology Clinic Consultation    Gisella Avalos MD  Encounter Date: 02/08/23     CC: Follow-up       No referring provider defined for this encounter. History obtained from: patient, medical records     Subjective:       Kathryn Read is an 32 y. o.  female who presents for Follow-up    The patient was initially seen in the office on 01/09/2023 with worsening acid reflux and constipation. She underwent an upper endoscopy with me on 01/12/2023. EGD was noted to be unremarkable without any evidence of esophagitis. Gastric biopsies however were positive for chronic active gastritis with H. pylori. Currently she mentions she still has intermittent epigastric discomfort. Denies any nausea and vomiting. She still has intermittent dizziness. Denies any dysphagia, melena, hematochezia.      Patient Active Problem List   Diagnosis    Class 3 obesity    Hypothyroidism, Acquired     History of iron deficiency anemia    Sprain of ankle    Syncope    Snoring    Sleep disturbances    Sleep state misperception    Hypersomnia    Secondary amenorrhea    Nausea    Miscarriage    CRP elevated    Generalized abdominal pain    Vertigo    Gastroesophageal reflux disease         Past Medical History:   Diagnosis Date    GERD (gastroesophageal reflux disease)     Hypothyroidism 2017    Obesity     Slow transit constipation         Past Surgical History:   Procedure Laterality Date    UPPER GASTROINTESTINAL ENDOSCOPY N/A 1/12/2023    EGD BIOPSY performed by Gisella Avalos MD at [address]          Family History   Problem Relation Age of Onset    High Cholesterol Mother     Depression Mother     Diabetes Mother     Heart Attack Father Other Father         DVT with PE    High Blood Pressure Sister     Migraines Sister     Asthma Sister     Diabetes Maternal Grandmother     Heart Disease Maternal Grandmother     Kidney Disease Maternal Grandmother     Diabetes Maternal Grandfather     Diabetes Paternal Grandmother     Stroke Paternal Grandmother     Diabetes type 2  Paternal Grandmother     No Known Problems Paternal Grandfather     No Known Problems Half-Sister     No Known Problems Half-Sister     No Known Problems Half-Sister     Depression Half-Brother     Anxiety Disorder Half-Brother     No Known Problems Son         Social History     Socioeconomic History    Marital status: Single     Spouse name: Not on file    Number of children: Not on file    Years of education: Not on file    Highest education level: Not on file   Occupational History    Not on file   Tobacco Use    Smoking status: Never    Smokeless tobacco: Never   Vaping Use    Vaping Use: Never used   Substance and Sexual Activity    Alcohol use: Yes     Comment: rare    Drug use: Never    Sexual activity: Yes     Partners: Male   Other Topics Concern    Not on file   Social History Narrative    Not on file     Social Determinants of Health     Financial Resource Strain: Low Risk     Difficulty of Paying Living Expenses: Not hard at all   Food Insecurity: No Food Insecurity    Worried About Running Out of Food in the Last Year: Never true    Ran Out of Food in the Last Year: Never true   Transportation Needs: Not on file   Physical Activity: Not on file   Stress: Not on file   Social Connections: Not on file   Intimate Partner Violence: Not on file   Housing Stability: Not on file        Social History     Social History Narrative    Not on file           Review of Systems  Reviewed all 14 systems with patient/family member. Pertinent items in HPI.      Medications:    Current Outpatient Medications:     clarithromycin (BIAXIN) 500 MG tablet, Take 1 tablet by mouth 2 times daily for 14 days Treatment for H. Pylori Eradication. Stop taking Statin medication while taking this drug., Disp: 28 tablet, Rfl: 0    amoxicillin (AMOXIL) 500 MG capsule, Take 2 capsules by mouth 2 times daily for 14 days, Disp: 56 capsule, Rfl: 0    metroNIDAZOLE (FLAGYL) 500 MG tablet, Take 1 tablet by mouth 2 times daily for 14 days, Disp: 28 tablet, Rfl: 0    omeprazole (PRILOSEC) 20 MG delayed release capsule, Take 1 capsule by mouth 2 times daily (before meals) for 14 days, THEN 1 capsule Daily for 14 days. , Disp: 42 capsule, Rfl: 0    levothyroxine (SYNTHROID) 112 MCG tablet, Take 1 tablet by mouth daily, Disp: 90 tablet, Rfl: 0    polyethylene glycol (MIRALAX) 17 GM/SCOOP powder, Take 17 g by mouth 2 times daily, Disp: 1 each, Rfl: 2     Allergies:  Patient has no known allergies. Objective:    Blood pressure 124/66, pulse (!) 112, temperature 97.9 °F (36.6 °C), temperature source Infrared, height 5' 7\" (1.702 m), weight 277 lb 9.6 oz (125.9 kg), SpO2 98 %. Exam done in the presence of chaperone Washington Ajay  General appearance: alert, cooperative, no distress, appears stated age  Head: Normocephalic, without obvious abnormality, atraumatic  Eyes: conjunctivae/corneas clear. EOM's intact. Nose: Nares normal. No discharge  Throat: Lips, mucosa, and tongue normal. Teeth and gums normal  Neck: supple, symmetrical, trachea midline and no adenopathy  Back: symmetric, no curvature. No CVA tenderness. , no kyphosis present, no scoliosis present  Lungs: clear to auscultation bilaterally, no wheezes, rales, or ronchi, normal respiratory effort  Heart: regular rate and rhythm, S1, S2 normal, no murmur, click, rub or gallop  Abdomen: soft, non-tender.  No masses,  no hepatospleenomegally  Extremities: extremities normal, atraumatic, no cyanosis or edema  Skin: Skin color, texture, turgor normal. No rashes or lesions  Neurologic: Non focal, speech clear,   Psychiatry: Mood appropriate, no evidence of psychosis        Labs: Reviewed last labs/outside records       Assessment and Plan:  Lawyer Rouse was seen today for follow-up. Diagnoses and all orders for this visit:    Helicobacter pylori gastritis  -     H. PYLORI ANTIGEN, STOOL; Future    Gastroesophageal reflux disease, unspecified whether esophagitis present    Slow transit constipation    Other orders  -     clarithromycin (BIAXIN) 500 MG tablet; Take 1 tablet by mouth 2 times daily for 14 days Treatment for H. Pylori Eradication. Stop taking Statin medication while taking this drug.  -     amoxicillin (AMOXIL) 500 MG capsule; Take 2 capsules by mouth 2 times daily for 14 days  -     metroNIDAZOLE (FLAGYL) 500 MG tablet; Take 1 tablet by mouth 2 times daily for 14 days  -     omeprazole (PRILOSEC) 20 MG delayed release capsule; Take 1 capsule by mouth 2 times daily (before meals) for 14 days, THEN 1 capsule Daily for 14 days. Diagnosis Orders   1. Helicobacter pylori gastritis  H. PYLORI ANTIGEN, STOOL      2. Gastroesophageal reflux disease, unspecified whether esophagitis present        3. Slow transit constipation          Orders Placed This Encounter   Procedures    H. PYLORI ANTIGEN, STOOL     Standing Status:   Future     Standing Expiration Date:   2/8/2024        #1 H. pylori gastritis   recommend treatment with quadruple therapy for 14 days   -Omeprazole 20mg BID for 14 days, 20mg daily for 2 weeks then stop.  -Clarithromycin 500 mg BID  -Amoxicillin 1 gram BID  -Metronidazole 500 mg BID  Recommend retesting for H pylori with breath test or stool antigen in 2 months, 1 month after stopping antacids. No follow-ups on file.     Мария York MD 2/8/2023 3:39 PM     Time of note may not reflect time of encounter     CC: Referring MD

## 2023-02-08 NOTE — PATIENT INSTRUCTIONS
recommend treatment with quadruple therapy for 14 days   -Omeprazole 20mg BID for 14 days, 20mg daily for 2 weeks then stop.  -Clarithromycin 500 mg BID  -Amoxicillin 1 gram BID  -Metronidazole 500 mg BID  Recommend retesting for H pylori with breath test or stool antigen in 2 months, 1 month after stopping antacids.

## 2023-02-10 ENCOUNTER — TELEPHONE (OUTPATIENT)
Dept: GASTROENTEROLOGY | Age: 28
End: 2023-02-10

## 2023-02-10 NOTE — TELEPHONE ENCOUNTER
Patient left VM wanting a note for Wednesday, Thursday, and Friday as she stated her new medication is making her nauseated, light headed, and dizzy .  Please advise

## 2023-03-08 ENCOUNTER — OFFICE VISIT (OUTPATIENT)
Dept: CARDIOLOGY CLINIC | Age: 28
End: 2023-03-08
Payer: MEDICAID

## 2023-03-08 ENCOUNTER — HOSPITAL ENCOUNTER (OUTPATIENT)
Age: 28
Discharge: HOME | End: 2023-03-08

## 2023-03-08 VITALS
SYSTOLIC BLOOD PRESSURE: 124 MMHG | WEIGHT: 272 LBS | OXYGEN SATURATION: 99 % | HEART RATE: 94 BPM | DIASTOLIC BLOOD PRESSURE: 86 MMHG | BODY MASS INDEX: 42.69 KG/M2 | HEIGHT: 67 IN

## 2023-03-08 DIAGNOSIS — E66.01 MORBID OBESITY (HCC): ICD-10-CM

## 2023-03-08 DIAGNOSIS — R55 SYNCOPE, UNSPECIFIED SYNCOPE TYPE: ICD-10-CM

## 2023-03-08 PROCEDURE — 1036F TOBACCO NON-USER: CPT | Performed by: NURSE PRACTITIONER

## 2023-03-08 PROCEDURE — G8427 DOCREV CUR MEDS BY ELIG CLIN: HCPCS | Performed by: NURSE PRACTITIONER

## 2023-03-08 PROCEDURE — G8484 FLU IMMUNIZE NO ADMIN: HCPCS | Performed by: NURSE PRACTITIONER

## 2023-03-08 PROCEDURE — 93000 ELECTROCARDIOGRAM COMPLETE: CPT | Performed by: NURSE PRACTITIONER

## 2023-03-08 PROCEDURE — 99212 OFFICE O/P EST SF 10 MIN: CPT | Performed by: NURSE PRACTITIONER

## 2023-03-08 PROCEDURE — G8417 CALC BMI ABV UP PARAM F/U: HCPCS | Performed by: NURSE PRACTITIONER

## 2023-03-08 ASSESSMENT — ENCOUNTER SYMPTOMS
ORTHOPNEA: 0
SHORTNESS OF BREATH: 0

## 2023-03-08 NOTE — PROGRESS NOTES
3/8/2023  Primary cardiologist: Dr. Bebo Su:   Red Dealtorre  is an established 32 y.o.  female here for a 6 month follow up on syncope      SUBJECTIVE/OBJECTIVE:  Red Delatorre is a 32 y.o. female with a history of syncope, hypothyroidism, H pylori gastritis and morbid obesity  She had a negative YELENA for syncope or orthostatic hypotension at [place]. Echo was essentially normal. ETT showed reduced exercise performance without angina and ischemic changes. Holter monitor in 2021 no signifiant arrythmia or bradycardia     She has been seen by neurology as well. Had a normal EEG in 06/2022 and MRI    HPI :   Red Delatorre reports she continues to have syncope. Last two episodes occurred on Feb 25 and 26 th. She states she had three syncopal episodes on the 25th. Each time she was either sitting up or laying with feet elevated and became lightheaded, dizzy and diaphoretic and then had LOC. She reports she is unconscious for 30 minutes or longer- up to 1.5 hours. There is no loss of bowel or bladder or seizure-like activity reported. States her boyfriend who is an EMT was with her when she passed out in the car and he was not able to get her to awaken. States he laid the seat back and turned on the heated seat:  reports he did sternal rub but she did not respond. States she checked her blood sugar which was greater than 130 and checked her heart rate which was 86. She was able to arouse after more than 30 minutes. He did not take her to the ED at that time. Other times of syncope she states that she was laying on the couch with her feet elevated and had syncopal episodes. Review of Systems   Constitutional: Negative for diaphoresis and malaise/fatigue. Cardiovascular:  Positive for syncope. Negative for chest pain, claudication, dyspnea on exertion, irregular heartbeat, leg swelling, near-syncope, orthopnea, palpitations and paroxysmal nocturnal dyspnea. Respiratory:  Negative for shortness of breath. Neurological:  Negative for dizziness and light-headedness. Vitals:    03/08/23 1254   BP: 124/86   Pulse: 94   SpO2: 99%   Weight: 272 lb (123.4 kg)   Height: 5' 7\" (1.702 m)     No flowsheet data found. Wt Readings from Last 3 Encounters:   03/08/23 272 lb (123.4 kg)   02/08/23 277 lb 9.6 oz (125.9 kg)   01/10/23 268 lb (121.6 kg)     Body mass index is 42.6 kg/m². Physical Exam  Vitals reviewed. Constitutional:       Appearance: Normal appearance. She is obese. HENT:      Head: Normocephalic and atraumatic. Eyes:      Extraocular Movements: Extraocular movements intact. Pupils: Pupils are equal, round, and reactive to light. Neck:      Vascular: No carotid bruit. Cardiovascular:      Rate and Rhythm: Normal rate and regular rhythm. Pulses: Normal pulses. Pulmonary:      Effort: Pulmonary effort is normal.      Breath sounds: Normal breath sounds. No rales. Chest:      Chest wall: No tenderness. Abdominal:      General: There is no distension. Palpations: Abdomen is soft. Tenderness: There is no abdominal tenderness. Musculoskeletal:      Cervical back: No tenderness. Right lower leg: No edema. Left lower leg: No edema. Skin:     General: Skin is warm and dry. Capillary Refill: Capillary refill takes less than 2 seconds. Neurological:      General: No focal deficit present. Mental Status: She is alert and oriented to person, place, and time. Psychiatric:         Mood and Affect: Mood normal.         Behavior: Behavior normal.              Current Outpatient Medications   Medication Sig Dispense Refill    levothyroxine (SYNTHROID) 112 MCG tablet Take 1 tablet by mouth daily 90 tablet 0    polyethylene glycol (MIRALAX) 17 GM/SCOOP powder Take 17 g by mouth 2 times daily 1 each 2    omeprazole (PRILOSEC) 20 MG delayed release capsule Take 1 capsule by mouth 2 times daily (before meals) for 14 days, THEN 1 capsule Daily for 14 days.  (Patient not taking: Reported on 3/8/2023) 42 capsule 0     No current facility-administered medications for this visit. All pertinent data reviewed and discussed with patient       ASSESSMENT/PLAN:      Syncope  Syncopal episodes do not appear to be vasovagal.  Occur with sitting with no traumatic events occurring. She reports she is unconscious from 30 minutes up to an hour and a half which is highly unusual in the setting of vasovagal syncope. Also notes that she is passed out with laying down with feet elevated at times and other times when passing out sitting up she is laid down immediately and continues to have episodes of unresponsiveness for 30 minutes up to an 1.5 hours making this most likely not related to cardiac conditions including vasovagal syncope. May be having pseudo seizures. So far cardiac work-up is negative. EKG today sinus rhythm no acute ST or T wave abnormalities  Can refer to EP to assess for arrhythmia as she complains of lightheadedness and dizziness prior to episode however Holter monitor and stress test showed no arrhythmias. I did advise her to continue to keep her self well-hydrated and sit and rest when needed    Obesity  Bmi 42.6  Weight loss encouraged      Tests ordered: None  Follow-up 6 months or sooner if needed    Signed:  HUI Steven CNP, 3/8/2023, 1:03 PM    An electronic signature was used to authenticate this note. Please note this report has been partially produced using speech recognition software and may contain errors related to that system including errors in grammar, punctuation, and spelling, as well as words and phrases that may be inappropriate. If there are any questions or concerns please feel free to contact the dictating provider for clarification.

## 2023-03-09 ENCOUNTER — HOSPITAL ENCOUNTER (OUTPATIENT)
Age: 28
Setting detail: SPECIMEN
Discharge: HOME | End: 2023-03-09
Payer: MEDICAID

## 2023-03-09 PROCEDURE — 87338 HPYLORI STOOL AG IA: CPT

## 2023-03-10 ENCOUNTER — TELEPHONE (OUTPATIENT)
Dept: CARDIOLOGY CLINIC | Age: 28
End: 2023-03-10

## 2023-03-10 ENCOUNTER — OFFICE VISIT (OUTPATIENT)
Dept: FAMILY MEDICINE CLINIC | Age: 28
End: 2023-03-10

## 2023-03-10 VITALS
WEIGHT: 272.6 LBS | DIASTOLIC BLOOD PRESSURE: 82 MMHG | TEMPERATURE: 97.2 F | SYSTOLIC BLOOD PRESSURE: 130 MMHG | BODY MASS INDEX: 42.79 KG/M2 | HEIGHT: 67 IN | HEART RATE: 111 BPM | OXYGEN SATURATION: 99 %

## 2023-03-10 DIAGNOSIS — B96.89 ACUTE BACTERIAL SINUSITIS: Primary | ICD-10-CM

## 2023-03-10 DIAGNOSIS — J01.90 ACUTE BACTERIAL SINUSITIS: Primary | ICD-10-CM

## 2023-03-10 RX ORDER — AMOXICILLIN 500 MG/1
500 CAPSULE ORAL 3 TIMES DAILY
Qty: 30 CAPSULE | Refills: 0 | Status: SHIPPED | OUTPATIENT
Start: 2023-03-10 | End: 2023-03-20

## 2023-03-10 ASSESSMENT — ENCOUNTER SYMPTOMS
DIARRHEA: 1
VOMITING: 0
NAUSEA: 0
WHEEZING: 0
SHORTNESS OF BREATH: 0
COUGH: 1

## 2023-03-10 NOTE — PROGRESS NOTES
3/10/23    Kathryn Cost  1995    Mabel Sloan is a 32 y.o. female who presents today for evaluation of:  Chief Complaint   Patient presents with    Migraine    Pharyngitis    Cough    Congestion     Sick : ear pain, cough, sore throat, headache x 2 d. Son had strep + today. Review of Systems   Constitutional:  Negative for fever. Respiratory:  Positive for cough. Negative for shortness of breath and wheezing. Gastrointestinal:  Positive for diarrhea. Negative for nausea and vomiting. Neurological:  Positive for headaches. No Known Allergies     OBJECTIVE    /82 (Site: Right Upper Arm, Position: Sitting, Cuff Size: Large Adult)   Pulse (!) 111   Temp 97.2 °F (36.2 °C) (Infrared)   Ht 5' 7\" (1.702 m)   Wt 272 lb 9.6 oz (123.7 kg)   SpO2 99%   BMI 42.70 kg/m²     Physical Exam   Constitutional:       General: Not in acute distress. Appearance: Normal appearance. Not ill-appearing. Eyes:      General: No scleral icterus. HENT:      Head: Normocephalic. Right Ear: Tympanic membrane, ear canal and external ear normal.      Left Ear: Tympanic membrane, ear canal and external ear normal.      Nose: Nose normal.      + bilateral sinus tenderness. Mouth/Throat:      Mouth: Mucous membranes are moist.      Pharynx: No oropharyngeal exudate, posterior oropharyngeal erythema or uvula swelling. Tonsils: No tonsillar exudate or tonsillar abscesses. Cardiovascular:      Rate and Rhythm: Normal rate and regular rhythm. Heart sounds: No murmur heard. No friction rub. No gallop. Pulmonary:      Effort: Pulmonary effort is normal. No respiratory distress. Breath sounds: No wheezing, rhonchi or rales. Abdominal:      Palpations: Abdomen is soft. There is no mass. Tenderness: There is no abdominal tenderness. Musculoskeletal:     Moves all extremities normally. Skin:     General: Skin is warm. Coloration: Skin is not jaundiced. Neurological:      Mental Status: Patient is alert. Psychiatric:         Behavior: Behavior normal.         Thought Content: Thought content normal.         Judgment: Judgment normal.            ASSESSMENT/PLAN:    1. Acute bacterial sinusitis  -     amoxicillin (AMOXIL) 500 MG capsule; Take 1 capsule by mouth 3 times daily for 10 days, Disp-30 capsule, R-0Normal    I think she has an acute bacterial sinusitis and even though she has not been ill for very long I will prescribe amoxicillin since she had a family member who was diagnosed with strep throat this morning. Return if symptoms worsen or fail to improve.    Dafne Tomlin MD

## 2023-03-10 NOTE — LETTER
March 10, 2023       Kathryn Read YOB: 1995   [address]  [address]  [facility] [address] Date of Visit:  3/10/2023       To Whom It May Concern:    Kathryn Read was seen in my clinic on 3/10/2023. She may return to work on 3/13/2023. Please excuse 3/10/23..    If you have any questions or concerns, please don't hesitate to call.    Sincerely,        Casey Sauceda MD

## 2023-03-11 LAB — H PYLORI AG STL QL IA: NEGATIVE

## 2023-03-14 ENCOUNTER — TELEPHONE (OUTPATIENT)
Dept: CARDIOLOGY CLINIC | Age: 28
End: 2023-03-14

## 2023-03-20 ENCOUNTER — TELEPHONE (OUTPATIENT)
Dept: FAMILY MEDICINE CLINIC | Age: 28
End: 2023-03-20

## 2023-03-24 ENCOUNTER — OFFICE VISIT (OUTPATIENT)
Dept: FAMILY MEDICINE CLINIC | Age: 28
End: 2023-03-24
Payer: MEDICAID

## 2023-03-24 VITALS
RESPIRATION RATE: 16 BRPM | HEART RATE: 103 BPM | BODY MASS INDEX: 43.76 KG/M2 | OXYGEN SATURATION: 99 % | WEIGHT: 278.8 LBS | DIASTOLIC BLOOD PRESSURE: 80 MMHG | SYSTOLIC BLOOD PRESSURE: 130 MMHG | HEIGHT: 67 IN

## 2023-03-24 DIAGNOSIS — E03.9 ACQUIRED HYPOTHYROIDISM: ICD-10-CM

## 2023-03-24 DIAGNOSIS — R55 VASOVAGAL SYNCOPE: Primary | ICD-10-CM

## 2023-03-24 PROCEDURE — 1036F TOBACCO NON-USER: CPT | Performed by: FAMILY MEDICINE

## 2023-03-24 PROCEDURE — 99213 OFFICE O/P EST LOW 20 MIN: CPT | Performed by: FAMILY MEDICINE

## 2023-03-24 PROCEDURE — G8484 FLU IMMUNIZE NO ADMIN: HCPCS | Performed by: FAMILY MEDICINE

## 2023-03-24 PROCEDURE — G8427 DOCREV CUR MEDS BY ELIG CLIN: HCPCS | Performed by: FAMILY MEDICINE

## 2023-03-24 PROCEDURE — 36415 COLL VENOUS BLD VENIPUNCTURE: CPT | Performed by: FAMILY MEDICINE

## 2023-03-24 PROCEDURE — G8417 CALC BMI ABV UP PARAM F/U: HCPCS | Performed by: FAMILY MEDICINE

## 2023-03-24 SDOH — ECONOMIC STABILITY: HOUSING INSECURITY
IN THE LAST 12 MONTHS, WAS THERE A TIME WHEN YOU DID NOT HAVE A STEADY PLACE TO SLEEP OR SLEPT IN A SHELTER (INCLUDING NOW)?: NO

## 2023-03-24 SDOH — ECONOMIC STABILITY: FOOD INSECURITY: WITHIN THE PAST 12 MONTHS, THE FOOD YOU BOUGHT JUST DIDN'T LAST AND YOU DIDN'T HAVE MONEY TO GET MORE.: NEVER TRUE

## 2023-03-24 SDOH — ECONOMIC STABILITY: FOOD INSECURITY: WITHIN THE PAST 12 MONTHS, YOU WORRIED THAT YOUR FOOD WOULD RUN OUT BEFORE YOU GOT MONEY TO BUY MORE.: NEVER TRUE

## 2023-03-24 SDOH — ECONOMIC STABILITY: INCOME INSECURITY: HOW HARD IS IT FOR YOU TO PAY FOR THE VERY BASICS LIKE FOOD, HOUSING, MEDICAL CARE, AND HEATING?: NOT HARD AT ALL

## 2023-03-24 ASSESSMENT — ENCOUNTER SYMPTOMS
SHORTNESS OF BREATH: 1
WHEEZING: 1

## 2023-03-24 NOTE — PROGRESS NOTES
3/24/23    Kathryn Cost  1995    Noel Manley is a 32 y.o. female who presents today for evaluation of:  Chief Complaint   Patient presents with    Loss of Consciousness     Patient reports continued episodes of syncope with episodes lasting anywhere from hours to days at a time. Passing out : Has been to cardiologist and to neurologist. Neurologist diagnosed syncope and then referred to [facility] a few months ago and she has not yest heard from [facility]. She does not want to drive when she feels pre-syncopal or after a syncope event. Recently passed out on the floor in boyfriend's room and was out for > 1 hr before anyone knew about it. Review of Systems   Constitutional:  Positive for diaphoresis. Eyes:  Positive for visual disturbance. Respiratory:  Positive for shortness of breath and wheezing. Cardiovascular:  Positive for chest pain and palpitations. Neurological:  Positive for dizziness, syncope, speech difficulty, light-headedness and headaches. No Known Allergies     OBJECTIVE    /80 (Site: Right Upper Arm, Position: Sitting, Cuff Size: Large Adult)   Pulse (!) 103   Resp 16   Ht 5' 7\" (1.702 m)   Wt 278 lb 12.8 oz (126.5 kg)   SpO2 99%   BMI 43.67 kg/m²     Physical Exam   Constitutional:       General: Not in acute distress. Appearance: Normal appearance. Not ill-appearing. Eyes:      General: No scleral icterus. Cardiovascular:      Rate and Rhythm: Normal rate and regular rhythm. Heart sounds: No murmur heard. No friction rub. No gallop. Pulmonary:      Effort: Pulmonary effort is normal. No respiratory distress. Breath sounds: No wheezing, rhonchi or rales. Abdominal:      Palpations: Abdomen is soft. There is no mass. Tenderness: There is no abdominal tenderness. Musculoskeletal:     Moves all extremities normally. Skin:     General: Skin is warm. Coloration: Skin is not jaundiced.    Neurological:      Mental

## 2023-03-24 NOTE — ASSESSMENT & PLAN NOTE
She has syncope that has been diagnosed as vasovagal syncope. She is at a point where she has not been able to keep a job due to it. She needs further evaluation from a specialist to either decide if something can be done about it or to start the process for disability application. I will put in a referral to neurology at [facility]. I also suggested that she contact her neurologist and continue to work with them.

## 2023-03-24 NOTE — LETTER
March 24, 2023       Kathryn Cost YOB: 1995 [address] Date of Visit:  3/24/2023       To Whom It May Concern: It is my medical opinion that Kathryn Cost {Work release (duty restriction):58895}. If you have any questions or concerns, please don't hesitate to call.     Sincerely,        Elver Montejo MD

## 2023-03-24 NOTE — ASSESSMENT & PLAN NOTE
It has been 5 or 6 weeks since the most recent change in her levothyroxine dose. I will order a TSH and T4 and plan to continue levothyroxine at an adjusted dose if adjustment is needed.

## 2023-03-25 LAB
T4 FREE SERPL-MCNC: 1.2 NG/DL (ref 0.9–1.8)
TSH SERPL DL<=0.005 MIU/L-ACNC: 1.38 UIU/ML (ref 0.27–4.2)

## 2023-03-29 ENCOUNTER — TELEPHONE (OUTPATIENT)
Dept: FAMILY MEDICINE CLINIC | Age: 28
End: 2023-03-29

## 2023-04-10 PROBLEM — R55 VASOVAGAL SYNCOPE: Chronic | Status: ACTIVE | Noted: 2021-11-11

## 2023-04-11 PROBLEM — R73.03 PREDIABETES: Status: ACTIVE | Noted: 2023-04-11

## 2023-04-17 ENCOUNTER — TELEPHONE (OUTPATIENT)
Dept: FAMILY MEDICINE CLINIC | Age: 28
End: 2023-04-17

## 2023-05-05 ENCOUNTER — INITIAL CONSULT (OUTPATIENT)
Dept: CARDIOLOGY CLINIC | Age: 28
End: 2023-05-05
Payer: MEDICAID

## 2023-05-05 ENCOUNTER — TELEPHONE (OUTPATIENT)
Dept: FAMILY MEDICINE CLINIC | Age: 28
End: 2023-05-05

## 2023-05-05 VITALS
DIASTOLIC BLOOD PRESSURE: 88 MMHG | SYSTOLIC BLOOD PRESSURE: 122 MMHG | WEIGHT: 279 LBS | HEART RATE: 80 BPM | HEIGHT: 67 IN | BODY MASS INDEX: 43.79 KG/M2

## 2023-05-05 DIAGNOSIS — R55 SYNCOPE, UNSPECIFIED SYNCOPE TYPE: Primary | ICD-10-CM

## 2023-05-05 DIAGNOSIS — R55 VASOVAGAL SYNCOPE: Chronic | ICD-10-CM

## 2023-05-05 PROCEDURE — G8427 DOCREV CUR MEDS BY ELIG CLIN: HCPCS | Performed by: INTERNAL MEDICINE

## 2023-05-05 PROCEDURE — 93000 ELECTROCARDIOGRAM COMPLETE: CPT | Performed by: INTERNAL MEDICINE

## 2023-05-05 PROCEDURE — G8417 CALC BMI ABV UP PARAM F/U: HCPCS | Performed by: INTERNAL MEDICINE

## 2023-05-05 PROCEDURE — 99204 OFFICE O/P NEW MOD 45 MIN: CPT | Performed by: INTERNAL MEDICINE

## 2023-05-10 ENCOUNTER — TELEPHONE (OUTPATIENT)
Dept: CARDIOLOGY CLINIC | Age: 28
End: 2023-05-10

## 2023-05-22 ENCOUNTER — NURSE ONLY (OUTPATIENT)
Dept: CARDIOLOGY CLINIC | Age: 28
End: 2023-05-22

## 2023-05-22 ENCOUNTER — HOSPITAL ENCOUNTER (OUTPATIENT)
Age: 28
Discharge: HOME | End: 2023-05-22
Payer: MEDICAID

## 2023-05-22 DIAGNOSIS — R55 VASOVAGAL SYNCOPE: Primary | ICD-10-CM

## 2023-05-22 LAB
ANION GAP SERPL CALCULATED.3IONS-SCNC: 11 MMOL/L (ref 4–16)
APTT: 28.2 SECONDS (ref 25.1–37.1)
BUN SERPL-MCNC: 13 MG/DL (ref 6–23)
CALCIUM SERPL-MCNC: 9.3 MG/DL (ref 8.3–10.6)
CHLORIDE BLD-SCNC: 102 MMOL/L (ref 99–110)
CO2: 23 MMOL/L (ref 21–32)
CREAT SERPL-MCNC: 0.8 MG/DL (ref 0.6–1.1)
GFR SERPL CREATININE-BSD FRML MDRD: >60 ML/MIN/1.73M2
GLUCOSE SERPL-MCNC: 76 MG/DL (ref 70–99)
HCT VFR BLD CALC: 37.6 % (ref 37–47)
HEMOGLOBIN: 12.5 GM/DL (ref 12.5–16)
INR BLD: 0.97 INDEX
MAGNESIUM: 2 MG/DL (ref 1.8–2.4)
MCH RBC QN AUTO: 29.9 PG (ref 27–31)
MCHC RBC AUTO-ENTMCNC: 33.2 % (ref 32–36)
MCV RBC AUTO: 90 FL (ref 78–100)
PDW BLD-RTO: 13.2 % (ref 11.7–14.9)
PHOSPHORUS: 4.6 MG/DL (ref 2.5–4.9)
PLATELET # BLD: 409 K/CU MM (ref 140–440)
PMV BLD AUTO: 9.6 FL (ref 7.5–11.1)
POTASSIUM SERPL-SCNC: 4.3 MMOL/L (ref 3.5–5.1)
PROTHROMBIN TIME: 12.3 SECONDS (ref 11.7–14.5)
RBC # BLD: 4.18 M/CU MM (ref 4.2–5.4)
SODIUM BLD-SCNC: 136 MMOL/L (ref 135–145)
WBC # BLD: 6.9 K/CU MM (ref 4–10.5)

## 2023-05-22 PROCEDURE — 85730 THROMBOPLASTIN TIME PARTIAL: CPT

## 2023-05-22 PROCEDURE — 84100 ASSAY OF PHOSPHORUS: CPT

## 2023-05-22 PROCEDURE — 36415 COLL VENOUS BLD VENIPUNCTURE: CPT

## 2023-05-22 PROCEDURE — 85610 PROTHROMBIN TIME: CPT

## 2023-05-22 PROCEDURE — 80048 BASIC METABOLIC PNL TOTAL CA: CPT

## 2023-05-22 PROCEDURE — 85027 COMPLETE CBC AUTOMATED: CPT

## 2023-05-22 PROCEDURE — 83735 ASSAY OF MAGNESIUM: CPT

## 2023-05-22 NOTE — PROGRESS NOTES
Patient here in office and educated on LOOP RECORDER, schedule for 5/24/23 @ 1000, with arrival @ 0800, @ [facility]; risk explained; and consents signed. Also copy of orders given for labs and CXR due 5/22/23 at [facility]. Instruction given to patient to :  NPO after midnight the night before procedure; call hospital at [phone] to pre-register. May take rest of morning meds of procedure. Patient voiced understanding. Copies of consent & info scanned in chart.

## 2023-05-24 ENCOUNTER — HOSPITAL ENCOUNTER (OUTPATIENT)
Dept: CARDIAC CATH/INVASIVE PROCEDURES | Age: 28
Discharge: HOME | End: 2023-05-24
Attending: INTERNAL MEDICINE | Admitting: INTERNAL MEDICINE
Payer: MEDICAID

## 2023-05-24 ENCOUNTER — TELEPHONE (OUTPATIENT)
Dept: FAMILY MEDICINE CLINIC | Age: 28
End: 2023-05-24

## 2023-05-24 VITALS
HEART RATE: 83 BPM | RESPIRATION RATE: 16 BRPM | OXYGEN SATURATION: 96 % | DIASTOLIC BLOOD PRESSURE: 95 MMHG | SYSTOLIC BLOOD PRESSURE: 122 MMHG | TEMPERATURE: 97 F

## 2023-05-24 PROCEDURE — 33285 INSJ SUBQ CAR RHYTHM MNTR: CPT

## 2023-05-24 PROCEDURE — C1764 EVENT RECORDER, CARDIAC: HCPCS

## 2023-05-24 PROCEDURE — 33285 INSJ SUBQ CAR RHYTHM MNTR: CPT | Performed by: INTERNAL MEDICINE

## 2023-05-24 PROCEDURE — 2500000003 HC RX 250 WO HCPCS

## 2023-05-24 PROCEDURE — 2709999900 HC NON-CHARGEABLE SUPPLY

## 2023-05-24 ASSESSMENT — ENCOUNTER SYMPTOMS
CHEST TIGHTNESS: 0
VOMITING: 0
ABDOMINAL PAIN: 0
EYE PAIN: 0
ABDOMINAL PAIN: 0
CONSTIPATION: 0
BLOOD IN STOOL: 0
COUGH: 0
NAUSEA: 0
VOMITING: 0
DIARRHEA: 0
EYE PAIN: 0
SHORTNESS OF BREATH: 0
CONSTIPATION: 0
BLOOD IN STOOL: 0
SHORTNESS OF BREATH: 0
COLOR CHANGE: 0
BACK PAIN: 0
CHEST TIGHTNESS: 0
PHOTOPHOBIA: 0
COLOR CHANGE: 0
WHEEZING: 0
COUGH: 0
PHOTOPHOBIA: 0
NAUSEA: 0
BACK PAIN: 0
DIARRHEA: 0
WHEEZING: 0

## 2023-05-24 NOTE — PROGRESS NOTES
Arrived to cathlab for Loop insertion. Oriented to room and plan of care. Questions answered. Monitor on, family at bedside.

## 2023-05-24 NOTE — OP NOTE
[facility]     Electrophysiology Procedure Note       Date of Procedure: 5/24/2023  Patient's Name: Shaun Read  YOB: 1995   Medical Record Number: [id number]    Procedure Performed by: Juan Pinon MD     Procedures performed:    Loop recorder implantation  Electronic analysis and programming of the device        Indication of the procedure: Unspecified Syncope    Kathryn Read is a 32 y.o. female with unspecified syncope with episodes of syncope in the past with inconclusive neurology and cardiac evaluation so far here for loop recorder. Details of procedure:   Procedure's risks, benefits and alternatives of procedure were explained to patient. All questions were answered. Patient understood and informed consent was obtained. The patient was brought to the electrophysiology lab in a fasting nonsedated state. He was prepped and draped in usual sterile fashion. After injection of 2% lidocaine in the left pectoral area, Using the incision tool (Medtronic device), a small incision was made on the left side of sternum between third and fourth intercostal space. We then used the deployment tool to deploy the device subcutaneously. Subcuticular layer closed with 4-0 vicryl. Pressure was held for hemostasis post device placement. The skin was covered with Steri-Strips and pressure dressing. The patient tolerated the procedure well and there were no complications. Patient was transported to the holding area in stable condition. Device information:             Plan:   The patient will have usual post-implant care. Patient can be discharged home if remains stable with follow up in arrhythmia clinic.       Electronically signed by Lennox Joseph MD on 5/24/2023 at 11:04 AM

## 2023-05-24 NOTE — H&P
Electrophysiology h&P Note      Reason for consultation:  Syncope    Chief complaint : Dizziness, chest pain, syncope    Referring physician: Rodriguez Patel / Cynthia Morelos      Primary care physician: Rashel Alva MD      History of Present Illness: Today visit (05/24/23)    Patient here for Loop recorder implantation. No change in H&P noted from previous clinic visit. Previous visit:    Patient is a 27yr old female with hx of hypothyroidism, Obesity referred by  for syncope. Patient reports that she passes out irrespective of her position and feels like she is having dizziness, cold and clammy and then she will loses consciousness for some time seconds to minutes sometimes up to half an hour. Patient had a tilt table test and it was negative. Pt states off and on chest pains are sharp and last 90 minutes comes with dizziness , clammy and sweaty , nothing relieves it and this has been going on for years , bilateral edema. She is evaluated by  for chest pain.  She has edema mostly at the end of the day        Pastmedical history:   Past Medical History:   Diagnosis Date    GERD (gastroesophageal reflux disease)     Hypothyroidism 2017    Obesity     Slow transit constipation        Surgical history :   Past Surgical History:   Procedure Laterality Date    UPPER GASTROINTESTINAL ENDOSCOPY N/A 1/12/2023    EGD BIOPSY performed by Bear Antoine MD at [address]         Family history:   Family History   Problem Relation Age of Onset    High Cholesterol Mother     Depression Mother     Diabetes Mother     Heart Attack Father     Other Father         DVT with PE    High Blood Pressure Sister     Migraines Sister     Asthma Sister     Diabetes Maternal Grandmother     Heart Disease Maternal Grandmother         pacemaker    Kidney Disease Maternal Grandmother     Diabetes Maternal Grandfather     Other Paternal Grandmother

## 2023-05-25 ENCOUNTER — TELEPHONE (OUTPATIENT)
Dept: CARDIOLOGY CLINIC | Age: 28
End: 2023-05-25

## 2023-05-25 NOTE — TELEPHONE ENCOUNTER
Patient called she just had loop recorder placed , she needs note for work to excuse her absence for 5/23/5/24

## 2023-05-26 ENCOUNTER — TELEPHONE (OUTPATIENT)
Dept: CARDIOLOGY CLINIC | Age: 28
End: 2023-05-26

## 2023-05-26 ENCOUNTER — OFFICE VISIT (OUTPATIENT)
Dept: RHEUMATOLOGY | Age: 28
End: 2023-05-26
Payer: MEDICAID

## 2023-05-26 VITALS
BODY MASS INDEX: 43.38 KG/M2 | OXYGEN SATURATION: 99 % | WEIGHT: 277 LBS | HEART RATE: 87 BPM | SYSTOLIC BLOOD PRESSURE: 110 MMHG | DIASTOLIC BLOOD PRESSURE: 80 MMHG

## 2023-05-26 DIAGNOSIS — Z01.89 ENCOUNTER FOR OTHER SPECIFIED SPECIAL EXAMINATIONS: ICD-10-CM

## 2023-05-26 DIAGNOSIS — R79.82 ELEVATED C-REACTIVE PROTEIN (CRP): Primary | ICD-10-CM

## 2023-05-26 DIAGNOSIS — E66.01 CLASS 3 SEVERE OBESITY WITH BODY MASS INDEX (BMI) OF 40.0 TO 44.9 IN ADULT, UNSPECIFIED OBESITY TYPE, UNSPECIFIED WHETHER SERIOUS COMORBIDITY PRESENT (HCC): ICD-10-CM

## 2023-05-26 PROCEDURE — G8417 CALC BMI ABV UP PARAM F/U: HCPCS | Performed by: STUDENT IN AN ORGANIZED HEALTH CARE EDUCATION/TRAINING PROGRAM

## 2023-05-26 PROCEDURE — 99204 OFFICE O/P NEW MOD 45 MIN: CPT | Performed by: STUDENT IN AN ORGANIZED HEALTH CARE EDUCATION/TRAINING PROGRAM

## 2023-05-26 PROCEDURE — G8427 DOCREV CUR MEDS BY ELIG CLIN: HCPCS | Performed by: STUDENT IN AN ORGANIZED HEALTH CARE EDUCATION/TRAINING PROGRAM

## 2023-05-26 PROCEDURE — 1036F TOBACCO NON-USER: CPT | Performed by: STUDENT IN AN ORGANIZED HEALTH CARE EDUCATION/TRAINING PROGRAM

## 2023-05-30 ENCOUNTER — TELEPHONE (OUTPATIENT)
Dept: CARDIOLOGY CLINIC | Age: 28
End: 2023-05-30

## 2023-05-30 ENCOUNTER — TELEPHONE (OUTPATIENT)
Dept: FAMILY MEDICINE CLINIC | Age: 28
End: 2023-05-30

## 2023-05-30 NOTE — TELEPHONE ENCOUNTER
Left message for patient advising that Dr Agnieszka Davenport only has patients off work for a day or two post procedure. Procedure is not the invasive so don't need to be off a long period of time. If any other concerns or questions she can call the office back when available.

## 2023-05-30 NOTE — TELEPHONE ENCOUNTER
Patient called to see how long she should be off from work post loop recorder , she is very wore out

## 2023-05-30 NOTE — TELEPHONE ENCOUNTER
Pt called in stating that she has passed out twice this morning and wants to know if she can have a work excuse for today. Please advise.

## 2023-06-02 ENCOUNTER — NURSE ONLY (OUTPATIENT)
Dept: CARDIOLOGY CLINIC | Age: 28
End: 2023-06-02

## 2023-06-02 VITALS — TEMPERATURE: 98.4 F

## 2023-06-02 DIAGNOSIS — Z95.818 STATUS POST PLACEMENT OF IMPLANTABLE LOOP RECORDER: Primary | ICD-10-CM

## 2023-06-02 PROCEDURE — 99024 POSTOP FOLLOW-UP VISIT: CPT | Performed by: INTERNAL MEDICINE

## 2023-06-02 NOTE — PROGRESS NOTES
Patient seen status post loop insertion. Dressing removed. Site cleaned. No signs or symptoms of infection or swelling noted.

## 2023-06-19 ENCOUNTER — TELEPHONE (OUTPATIENT)
Dept: RHEUMATOLOGY | Age: 28
End: 2023-06-19

## 2023-06-19 NOTE — TELEPHONE ENCOUNTER
LVM advising the pt there was nothing urgent to report regarding the [facility] speciality labs. Dr. Aayush Anguiano discuss them in further detail at her follow up. Pt also advised to complete her future lab orders on 7/7/ to ensure we have them for her follow up on 7/12.

## 2023-06-19 NOTE — TELEPHONE ENCOUNTER
----- Message from Mortimer Rides, MD sent at 6/17/2023  4:05 PM EDT -----  Please notify patient that I have received exagen report, nothing urgent to address at this time. Will discuss at next visit. Please remind patient to get future labs on 7/7 that have been ordered.

## 2023-06-27 ENCOUNTER — OFFICE VISIT (OUTPATIENT)
Dept: CARDIOLOGY CLINIC | Age: 28
End: 2023-06-27
Payer: MEDICAID

## 2023-06-27 VITALS
SYSTOLIC BLOOD PRESSURE: 98 MMHG | WEIGHT: 281.4 LBS | HEART RATE: 82 BPM | BODY MASS INDEX: 44.17 KG/M2 | HEIGHT: 67 IN | DIASTOLIC BLOOD PRESSURE: 70 MMHG

## 2023-06-27 DIAGNOSIS — R55 SYNCOPE, UNSPECIFIED SYNCOPE TYPE: ICD-10-CM

## 2023-06-27 DIAGNOSIS — Z95.818 STATUS POST PLACEMENT OF IMPLANTABLE LOOP RECORDER: Primary | ICD-10-CM

## 2023-06-27 DIAGNOSIS — E66.01 MORBID OBESITY (HCC): ICD-10-CM

## 2023-06-27 DIAGNOSIS — I95.9 HYPOTENSION, UNSPECIFIED HYPOTENSION TYPE: ICD-10-CM

## 2023-06-27 PROCEDURE — 93291 INTERROG DEV EVAL SCRMS IP: CPT | Performed by: NURSE PRACTITIONER

## 2023-06-27 PROCEDURE — 1036F TOBACCO NON-USER: CPT | Performed by: NURSE PRACTITIONER

## 2023-06-27 PROCEDURE — 99214 OFFICE O/P EST MOD 30 MIN: CPT | Performed by: NURSE PRACTITIONER

## 2023-06-27 PROCEDURE — G8427 DOCREV CUR MEDS BY ELIG CLIN: HCPCS | Performed by: NURSE PRACTITIONER

## 2023-06-27 PROCEDURE — G8417 CALC BMI ABV UP PARAM F/U: HCPCS | Performed by: NURSE PRACTITIONER

## 2023-06-27 ASSESSMENT — ENCOUNTER SYMPTOMS
BLOOD IN STOOL: 0
ABDOMINAL PAIN: 0
NAUSEA: 0
COLOR CHANGE: 0
SINUS PRESSURE: 0
COUGH: 0
CONSTIPATION: 0
VOMITING: 0
DIARRHEA: 0
BACK PAIN: 0
ABDOMINAL DISTENTION: 0
WHEEZING: 0
SHORTNESS OF BREATH: 0
CHEST TIGHTNESS: 0
SINUS PAIN: 0
EYE PAIN: 0

## 2023-06-28 ENCOUNTER — TELEPHONE (OUTPATIENT)
Dept: CARDIOLOGY CLINIC | Age: 28
End: 2023-06-28

## 2023-06-28 RX ORDER — MEDICAL SUPPLY, MISCELLANEOUS
1 EACH MISCELLANEOUS 2 TIMES DAILY
Qty: 1 EACH | Refills: 0 | Status: SHIPPED | OUTPATIENT
Start: 2023-06-28

## 2023-07-07 ENCOUNTER — TELEPHONE (OUTPATIENT)
Dept: FAMILY MEDICINE CLINIC | Age: 28
End: 2023-07-07

## 2023-07-12 ENCOUNTER — OFFICE VISIT (OUTPATIENT)
Dept: GASTROENTEROLOGY | Age: 28
End: 2023-07-12
Payer: MEDICAID

## 2023-07-12 ENCOUNTER — OFFICE VISIT (OUTPATIENT)
Dept: RHEUMATOLOGY | Age: 28
End: 2023-07-12
Payer: MEDICAID

## 2023-07-12 ENCOUNTER — HOSPITAL ENCOUNTER (OUTPATIENT)
Age: 28
Discharge: HOME | End: 2023-07-12
Payer: MEDICAID

## 2023-07-12 VITALS
SYSTOLIC BLOOD PRESSURE: 112 MMHG | TEMPERATURE: 97.3 F | DIASTOLIC BLOOD PRESSURE: 74 MMHG | OXYGEN SATURATION: 97 % | WEIGHT: 278.8 LBS | HEIGHT: 67 IN | HEART RATE: 104 BPM | BODY MASS INDEX: 43.76 KG/M2

## 2023-07-12 VITALS
HEART RATE: 99 BPM | BODY MASS INDEX: 43.7 KG/M2 | DIASTOLIC BLOOD PRESSURE: 80 MMHG | WEIGHT: 279 LBS | OXYGEN SATURATION: 89 % | SYSTOLIC BLOOD PRESSURE: 125 MMHG

## 2023-07-12 DIAGNOSIS — R79.82 ELEVATED C-REACTIVE PROTEIN (CRP): ICD-10-CM

## 2023-07-12 DIAGNOSIS — K59.04 CHRONIC IDIOPATHIC CONSTIPATION: Primary | ICD-10-CM

## 2023-07-12 DIAGNOSIS — R79.82 ELEVATED C-REACTIVE PROTEIN (CRP): Primary | ICD-10-CM

## 2023-07-12 DIAGNOSIS — E55.9 VITAMIN D DEFICIENCY: ICD-10-CM

## 2023-07-12 DIAGNOSIS — K21.9 GASTROESOPHAGEAL REFLUX DISEASE WITHOUT ESOPHAGITIS: ICD-10-CM

## 2023-07-12 DIAGNOSIS — Z01.89 ENCOUNTER FOR OTHER SPECIFIED SPECIAL EXAMINATIONS: ICD-10-CM

## 2023-07-12 LAB
25(OH)D3 SERPL-MCNC: 15.28 NG/ML
CRP SERPL HS-MCNC: 8 MG/L
HAV IGM SERPL QL IA: NON REACTIVE
HBV CORE IGM SERPL QL IA: NON REACTIVE
HBV SURFACE AG SERPL QL IA: NON REACTIVE
HCV AB SERPL QL IA: NON REACTIVE

## 2023-07-12 PROCEDURE — G8417 CALC BMI ABV UP PARAM F/U: HCPCS | Performed by: NURSE PRACTITIONER

## 2023-07-12 PROCEDURE — 36415 COLL VENOUS BLD VENIPUNCTURE: CPT

## 2023-07-12 PROCEDURE — 86140 C-REACTIVE PROTEIN: CPT

## 2023-07-12 PROCEDURE — 99213 OFFICE O/P EST LOW 20 MIN: CPT | Performed by: NURSE PRACTITIONER

## 2023-07-12 PROCEDURE — 1036F TOBACCO NON-USER: CPT | Performed by: NURSE PRACTITIONER

## 2023-07-12 PROCEDURE — 86430 RHEUMATOID FACTOR TEST QUAL: CPT

## 2023-07-12 PROCEDURE — 82306 VITAMIN D 25 HYDROXY: CPT

## 2023-07-12 PROCEDURE — 1036F TOBACCO NON-USER: CPT | Performed by: STUDENT IN AN ORGANIZED HEALTH CARE EDUCATION/TRAINING PROGRAM

## 2023-07-12 PROCEDURE — G8417 CALC BMI ABV UP PARAM F/U: HCPCS | Performed by: STUDENT IN AN ORGANIZED HEALTH CARE EDUCATION/TRAINING PROGRAM

## 2023-07-12 PROCEDURE — G8427 DOCREV CUR MEDS BY ELIG CLIN: HCPCS | Performed by: NURSE PRACTITIONER

## 2023-07-12 PROCEDURE — G8427 DOCREV CUR MEDS BY ELIG CLIN: HCPCS | Performed by: STUDENT IN AN ORGANIZED HEALTH CARE EDUCATION/TRAINING PROGRAM

## 2023-07-12 PROCEDURE — 80074 ACUTE HEPATITIS PANEL: CPT

## 2023-07-12 PROCEDURE — 99214 OFFICE O/P EST MOD 30 MIN: CPT | Performed by: STUDENT IN AN ORGANIZED HEALTH CARE EDUCATION/TRAINING PROGRAM

## 2023-07-12 RX ORDER — MELATONIN
1000 DAILY
Qty: 90 TABLET | Refills: 1 | Status: SHIPPED | OUTPATIENT
Start: 2023-07-12

## 2023-07-12 NOTE — PROGRESS NOTES
RHEUMATOLOGY FOLLOW-UP VISIT    2023      Patient Name: Maged Read  : 1995  Medical Record: [id number]      CHIEF COMPLAINT    Elevated CRP    Pertinent Problems  Obesity, BMI 43.7  GERD  Generalized abdominal pain  Iron deficiency anemia  History of syncope, MRI brain unremarkable 2022, EF 55 to 60% (echo ), normal EKG 2023, Holter monitor, no significant ectopy ()    HISTORY OF PRESENT ILLNESS    Kathryn Read is a 25 y.o. female who established on 2023  She has been having syncopal episodes x 3 years. She has been evaluated by neurology/ cardiology and findings have been normal.  Patient describes her syncopal events to be random, duration last for minutes to an hour. Not associated with chest pain, palpitations, shortness of breath, not positional, situational or postprandial.  Holter monitor in the past was worn for 24 hours, then 2 weeks. She currently is wearing a Holter monitor, implanted , planned to stay in place for 3 years. She has had no syncopal events while wearing the monitor. Disease progression:   She denies joint pains  Raynaud's: Denies  Chest Pain: +  s/p loop recorder implanted on   SOB: Denies  Miscarriages: had one miscarriage, one living child before miscarriage   Blood Clots: Denies    LCV: 2023  BETTY and BETTY comprehensive panel negative  CRP, hepatitis panel, RF not done    Subjective:  She is here to discuss her results  She has not had any syncopal events since last visit and is now wearing a linq device. Vit D L   RF pending. Current rheum meds: none     Past rheum meds:     No flowsheet data found.       REVIEW OF SYSTEMS     Constitutional:  Denies fever or chills, decreased appetite, or weight loss   Eyes:  Denies change in visual acuity or eye dryness or irritation  HENT:  Denies dry mouth or oral ulcers  Respiratory:  Denies cough or shortness of breath   Cardiovascular:  Denies chest pain or edema   GI:  Denies

## 2023-07-12 NOTE — PROGRESS NOTES
Kathryn Read 25 y.o. female was seen by MARCIA Ramirez on 7/12/2023     Wt Readings from Last 3 Encounters:   07/12/23 278 lb 12.8 oz (126.5 kg)   06/27/23 281 lb 6.4 oz (127.6 kg)   05/26/23 277 lb (125.6 kg)       DANNY Read is a pleasant 25 y.o.  female who presents today for follow-up on acid reflux and chronic constipation. Her EGD done by Dr. Sherron Nelson on 01/12/2023 showed no evidence of esophagitis. Her gastric biopsies were positive for chronic active gastritis with H. Pylori infection. She was treated with triple antibiotics for fourteen days. Her stool was negative for H. pylori infection on 3-9-2023. Her appetite is good and weight. No abdominal pain, bloating or distention. No heartburn or acid reflux. No nocturnal awakenings with acid reflux. No dysphagia or pain with swallowing. No nausea or vomiting. Intermittent lower abdominal discomfort and bloating. No excess belching or flatulence. Her bowels are moving every three to four days with hard stools. Linzess 145 mcg did not help. Miralax and stool softeners minimal help. MOM no help. No diarrhea. No rectal bleeding or melena. No family history of stomach or colon cancer. ROS  Review of Systems   Constitutional:  Negative for appetite change, chills, diaphoresis, fatigue, fever and unexpected weight change. HENT:  Negative for ear pain, hearing loss and tinnitus. Eyes:  Negative for photophobia, pain and visual disturbance. Respiratory:  Negative for cough, shortness of breath and wheezing. Cardiovascular:  Negative for chest pain, palpitations and leg swelling. Gastrointestinal:  Positive for constipation. Negative for abdominal pain, blood in stool, diarrhea, nausea and vomiting. Endocrine: Negative for cold intolerance, heat intolerance and polydipsia. Genitourinary:  Negative for dysuria, frequency and urgency. Musculoskeletal:  Negative for back pain, myalgias and neck pain.

## 2023-07-13 LAB — RHEUMATOID FACTOR: <10 IU/ML (ref 0–14)

## 2023-07-19 ENCOUNTER — PROCEDURE VISIT (OUTPATIENT)
Dept: CARDIOLOGY CLINIC | Age: 28
End: 2023-07-19

## 2023-07-19 DIAGNOSIS — Z95.818 STATUS POST PLACEMENT OF IMPLANTABLE LOOP RECORDER: Primary | ICD-10-CM

## 2023-08-23 ENCOUNTER — TELEPHONE (OUTPATIENT)
Dept: CARDIOLOGY CLINIC | Age: 28
End: 2023-08-23

## 2023-08-23 NOTE — TELEPHONE ENCOUNTER
Patient called back and stated getting an error on carelink machine. Advised patient to call Medtronic number for Medtronic given. Called patient back to see what Medtronic had said and she stated she got disconnected from Medtronic. Gave patient a new number to Medtronic to try. [phone] advised patient to listen to prompts and try pushing 0. Patient stated she would try and call back. Also offered patient an apt to come down in AM so we could do a transmission on her in pacer room, patient stated she could not come down to office till Friday. Advised patient that if symptoms get worse then she needs to seek treatment at closest ED. Advised patient that if she can still not get help with Medtronic to call back tomorrow for an apt on Friday AM. Patient stated understanding. No further c/o noted.

## 2023-08-25 ENCOUNTER — TELEPHONE (OUTPATIENT)
Dept: CARDIOLOGY CLINIC | Age: 28
End: 2023-08-25

## 2023-08-25 ENCOUNTER — PROCEDURE VISIT (OUTPATIENT)
Dept: CARDIOLOGY CLINIC | Age: 28
End: 2023-08-25
Payer: MEDICAID

## 2023-08-25 VITALS — DIASTOLIC BLOOD PRESSURE: 82 MMHG | HEART RATE: 80 BPM | SYSTOLIC BLOOD PRESSURE: 112 MMHG

## 2023-08-25 DIAGNOSIS — Z45.09 ENCOUNTER FOR LOOP RECORDER CHECK: Primary | ICD-10-CM

## 2023-08-25 PROCEDURE — 93298 REM INTERROG DEV EVAL SCRMS: CPT | Performed by: INTERNAL MEDICINE

## 2023-08-25 NOTE — TELEPHONE ENCOUNTER
Patient called this am asking what time she should come in today. Patient stated she could not call yesterday due to having to go work and still can not reach Medtronic, has left messages and no return call. Gave patient apt today to have loop interrogated at 10 am and also advised to bring machine with her. Patient voiced understanding.

## 2023-08-30 PROBLEM — Z98.890 HISTORY OF LOOP RECORDER: Status: ACTIVE | Noted: 2023-08-30

## 2023-09-19 ENCOUNTER — OFFICE VISIT (OUTPATIENT)
Dept: CARDIOLOGY CLINIC | Age: 28
End: 2023-09-19
Payer: MEDICAID

## 2023-09-19 VITALS
HEIGHT: 67 IN | BODY MASS INDEX: 45.01 KG/M2 | SYSTOLIC BLOOD PRESSURE: 114 MMHG | HEART RATE: 96 BPM | OXYGEN SATURATION: 99 % | DIASTOLIC BLOOD PRESSURE: 72 MMHG | WEIGHT: 286.8 LBS

## 2023-09-19 DIAGNOSIS — R55 SYNCOPE, UNSPECIFIED SYNCOPE TYPE: ICD-10-CM

## 2023-09-19 PROCEDURE — G8427 DOCREV CUR MEDS BY ELIG CLIN: HCPCS | Performed by: NURSE PRACTITIONER

## 2023-09-19 PROCEDURE — 99212 OFFICE O/P EST SF 10 MIN: CPT | Performed by: NURSE PRACTITIONER

## 2023-09-19 PROCEDURE — 1036F TOBACCO NON-USER: CPT | Performed by: NURSE PRACTITIONER

## 2023-09-19 PROCEDURE — G8417 CALC BMI ABV UP PARAM F/U: HCPCS | Performed by: NURSE PRACTITIONER

## 2023-09-19 ASSESSMENT — ENCOUNTER SYMPTOMS
ORTHOPNEA: 0
SHORTNESS OF BREATH: 0

## 2023-10-10 ENCOUNTER — OFFICE VISIT (OUTPATIENT)
Dept: GASTROENTEROLOGY | Age: 28
End: 2023-10-10
Payer: MEDICAID

## 2023-10-10 ENCOUNTER — OFFICE VISIT (OUTPATIENT)
Dept: FAMILY MEDICINE CLINIC | Age: 28
End: 2023-10-10
Payer: MEDICAID

## 2023-10-10 VITALS
TEMPERATURE: 98.2 F | DIASTOLIC BLOOD PRESSURE: 74 MMHG | OXYGEN SATURATION: 99 % | HEART RATE: 97 BPM | SYSTOLIC BLOOD PRESSURE: 124 MMHG | BODY MASS INDEX: 45.8 KG/M2 | WEIGHT: 292.4 LBS

## 2023-10-10 VITALS
HEIGHT: 67 IN | OXYGEN SATURATION: 98 % | HEART RATE: 84 BPM | BODY MASS INDEX: 45.73 KG/M2 | WEIGHT: 291.38 LBS | DIASTOLIC BLOOD PRESSURE: 68 MMHG | RESPIRATION RATE: 16 BRPM | SYSTOLIC BLOOD PRESSURE: 122 MMHG

## 2023-10-10 DIAGNOSIS — E66.01 MORBID OBESITY (HCC): Primary | ICD-10-CM

## 2023-10-10 DIAGNOSIS — R73.03 PREDIABETES: ICD-10-CM

## 2023-10-10 DIAGNOSIS — K59.09 OTHER CONSTIPATION: ICD-10-CM

## 2023-10-10 DIAGNOSIS — K59.04 CHRONIC IDIOPATHIC CONSTIPATION: Primary | ICD-10-CM

## 2023-10-10 DIAGNOSIS — Z86.19 HISTORY OF HELICOBACTER PYLORI INFECTION: ICD-10-CM

## 2023-10-10 DIAGNOSIS — E03.9 ACQUIRED HYPOTHYROIDISM: ICD-10-CM

## 2023-10-10 DIAGNOSIS — E66.01 CLASS 3 SEVERE OBESITY DUE TO EXCESS CALORIES WITHOUT SERIOUS COMORBIDITY WITH BODY MASS INDEX (BMI) OF 45.0 TO 49.9 IN ADULT (HCC): ICD-10-CM

## 2023-10-10 DIAGNOSIS — R79.82 CRP ELEVATED: ICD-10-CM

## 2023-10-10 PROCEDURE — 1036F TOBACCO NON-USER: CPT | Performed by: FAMILY MEDICINE

## 2023-10-10 PROCEDURE — G8484 FLU IMMUNIZE NO ADMIN: HCPCS | Performed by: NURSE PRACTITIONER

## 2023-10-10 PROCEDURE — G8427 DOCREV CUR MEDS BY ELIG CLIN: HCPCS | Performed by: FAMILY MEDICINE

## 2023-10-10 PROCEDURE — G8417 CALC BMI ABV UP PARAM F/U: HCPCS | Performed by: NURSE PRACTITIONER

## 2023-10-10 PROCEDURE — 99213 OFFICE O/P EST LOW 20 MIN: CPT | Performed by: NURSE PRACTITIONER

## 2023-10-10 PROCEDURE — G8427 DOCREV CUR MEDS BY ELIG CLIN: HCPCS | Performed by: NURSE PRACTITIONER

## 2023-10-10 PROCEDURE — 36415 COLL VENOUS BLD VENIPUNCTURE: CPT | Performed by: FAMILY MEDICINE

## 2023-10-10 PROCEDURE — G8484 FLU IMMUNIZE NO ADMIN: HCPCS | Performed by: FAMILY MEDICINE

## 2023-10-10 PROCEDURE — 1036F TOBACCO NON-USER: CPT | Performed by: NURSE PRACTITIONER

## 2023-10-10 PROCEDURE — G8417 CALC BMI ABV UP PARAM F/U: HCPCS | Performed by: FAMILY MEDICINE

## 2023-10-10 PROCEDURE — 99214 OFFICE O/P EST MOD 30 MIN: CPT | Performed by: FAMILY MEDICINE

## 2023-10-10 RX ORDER — METFORMIN HYDROCHLORIDE 500 MG/1
1000 TABLET, EXTENDED RELEASE ORAL
Qty: 180 TABLET | Refills: 1 | Status: SHIPPED | OUTPATIENT
Start: 2023-10-10

## 2023-10-10 RX ORDER — LEVOTHYROXINE SODIUM 112 UG/1
112 TABLET ORAL DAILY
Qty: 90 TABLET | Refills: 2 | Status: CANCELLED | OUTPATIENT
Start: 2023-10-10

## 2023-10-10 ASSESSMENT — ENCOUNTER SYMPTOMS
APNEA: 0
BLOOD IN STOOL: 0
ANAL BLEEDING: 0
CONSTIPATION: 1

## 2023-10-10 NOTE — ASSESSMENT & PLAN NOTE
I will recheck a CRP. Unfortunately I did not put in an order to recheck the sed rate and that blood is drawn in the different kind of tube.

## 2023-10-10 NOTE — PROGRESS NOTES
Kathryn Read 25 y.o. female was seen by MARCIA Harris on 10/10/2023     Wt Readings from Last 3 Encounters:   10/10/23 291 lb 6 oz (132.2 kg)   10/10/23 292 lb 6.4 oz (132.6 kg)   09/19/23 286 lb 12.8 oz (130.1 kg)       DANNY Read is a pleasant 25 y.o.  female who presents today for follow-up on chronic constipation. In the last few months she has been having more stress in her life with her brother passing away and preparing for finals. Her bowels are moving twice a week with soft brown formed stools. She does endorse eating bananas and a lot of cheese. Linzess 290 mcg did not help her constipation so she stopped taking two months ago. Miralax and stool softeners no help. No diarrhea. No blood in her stools or melena. No excess belching or flatulence. Her appetite is good and weight is stable. No abdominal pain. Intermittent lower abdominal discomfort and bloating that is non-worsening. No heartburn or acid reflux symptoms. No nocturnal awakenings with acid reflux. No dysphagia or pain with swallowing. No nausea or vomiting. No family history of stomach or colon cancer. ROS  Review of Systems   Constitutional:  Negative for appetite change, chills, diaphoresis, fatigue, fever and unexpected weight change. HENT:  Negative for ear pain, hearing loss and tinnitus. Eyes:  Negative for photophobia, pain and visual disturbance. Respiratory:  Negative for cough, shortness of breath and wheezing. Cardiovascular:  Negative for chest pain, palpitations and leg swelling. Gastrointestinal:  Positive for constipation. Negative for abdominal pain, blood in stool, diarrhea, nausea and vomiting. Endocrine: Negative for cold intolerance, heat intolerance and polydipsia. Genitourinary:  Negative for dysuria, frequency and urgency. Musculoskeletal:  Negative for back pain, myalgias and neck pain. Skin:  Negative for color change, pallor and rash.

## 2023-10-10 NOTE — ASSESSMENT & PLAN NOTE
Checking TSH and T4. Plan to continue levothyroxine. I will possibly adjust the dose when we get the results back.

## 2023-10-10 NOTE — ASSESSMENT & PLAN NOTE
Prescribing metformin in part to reduce risk of diabetes, in part to manage constipation, and in part to hopefully help with obesity.

## 2023-10-10 NOTE — ASSESSMENT & PLAN NOTE
Discussed stress reduction as part of a protocol to help with weight loss. I will check an a.m. cortisol and fasting insulin level to check for insulin resistance. Offered referral for bariatric surgery or consideration of medication to help with weight loss but she would rather work on diet and exercise and stress reduction for now.

## 2023-10-11 ENCOUNTER — TELEPHONE (OUTPATIENT)
Dept: BARIATRICS/WEIGHT MGMT | Age: 28
End: 2023-10-11

## 2023-10-11 LAB
CORTIS AM PEAK SERPL-MCNC: 13.8 UG/DL (ref 4.3–22.4)
T4 FREE SERPL-MCNC: 1.3 NG/DL (ref 0.9–1.8)
TSH SERPL DL<=0.005 MIU/L-ACNC: 4.96 UIU/ML (ref 0.27–4.2)

## 2023-10-11 RX ORDER — LEVOTHYROXINE SODIUM 0.12 MG/1
125 TABLET ORAL DAILY
Qty: 90 TABLET | Refills: 1 | Status: SHIPPED | OUTPATIENT
Start: 2023-10-11

## 2023-10-12 LAB
INSULIN COMMENT: NORMAL
INSULIN REFERENCE RANGE:: NORMAL
INSULIN SERPL-ACNC: 24.3 MU/L

## 2023-11-10 ENCOUNTER — PROCEDURE VISIT (OUTPATIENT)
Dept: CARDIOLOGY CLINIC | Age: 28
End: 2023-11-10

## 2023-11-10 DIAGNOSIS — Z45.09 ENCOUNTER FOR LOOP RECORDER CHECK: ICD-10-CM

## 2023-11-10 DIAGNOSIS — R55 SYNCOPE, UNSPECIFIED SYNCOPE TYPE: Primary | ICD-10-CM

## 2023-11-27 ENCOUNTER — TELEPHONE (OUTPATIENT)
Dept: CARDIOLOGY CLINIC | Age: 28
End: 2023-11-27

## 2023-11-27 NOTE — TELEPHONE ENCOUNTER
Called patient to r/s apt with Noé Ramirez on 12/29/23 d/t provider out of office, no ans lm for patient to call back.
SOB

## 2023-12-28 DIAGNOSIS — E03.9 ACQUIRED HYPOTHYROIDISM: ICD-10-CM

## 2023-12-28 RX ORDER — LEVOTHYROXINE SODIUM 112 UG/1
112 TABLET ORAL DAILY
Qty: 90 TABLET | Refills: 2 | OUTPATIENT
Start: 2023-12-28

## 2024-01-02 ENCOUNTER — OFFICE VISIT (OUTPATIENT)
Dept: CARDIOLOGY CLINIC | Age: 29
End: 2024-01-02

## 2024-01-02 ENCOUNTER — HOSPITAL ENCOUNTER (OUTPATIENT)
Age: 29
Discharge: HOME | End: 2024-01-02
Payer: MEDICAID

## 2024-01-02 VITALS
WEIGHT: 286 LBS | SYSTOLIC BLOOD PRESSURE: 98 MMHG | HEIGHT: 67 IN | BODY MASS INDEX: 44.89 KG/M2 | DIASTOLIC BLOOD PRESSURE: 64 MMHG | HEART RATE: 94 BPM

## 2024-01-02 DIAGNOSIS — R55 SYNCOPE, UNSPECIFIED SYNCOPE TYPE: Primary | ICD-10-CM

## 2024-01-02 DIAGNOSIS — R42 DIZZINESS: ICD-10-CM

## 2024-01-02 DIAGNOSIS — I95.89 OTHER SPECIFIED HYPOTENSION: ICD-10-CM

## 2024-01-02 LAB — CRP SERPL HS-MCNC: 10.2 MG/L

## 2024-01-02 PROCEDURE — 86140 C-REACTIVE PROTEIN: CPT

## 2024-01-02 PROCEDURE — 36415 COLL VENOUS BLD VENIPUNCTURE: CPT

## 2024-01-02 ASSESSMENT — ENCOUNTER SYMPTOMS
COUGH: 0
CONSTIPATION: 0
BLOOD IN STOOL: 0
DIARRHEA: 0
ABDOMINAL PAIN: 0
COLOR CHANGE: 0
WHEEZING: 0
SINUS PRESSURE: 0
BACK PAIN: 0
EYE PAIN: 0
SINUS PAIN: 0
VOMITING: 0
SHORTNESS OF BREATH: 0
NAUSEA: 0
CHEST TIGHTNESS: 0
ABDOMINAL DISTENTION: 0

## 2024-01-02 NOTE — PATIENT INSTRUCTIONS
Please start wearing compression stockings daily  Increase fluid intake- electrolyte strong.       Please be informed that if you contact our office outside of normal business hours the physician on call cannot help with any scheduling or rescheduling issues, procedure instruction questions or any type of medication issue.    We advise you for any urgent/emergency that you go to the nearest emergency room!    PLEASE CALL OUR OFFICE DURING NORMAL BUSINESS HOURS    Monday - Friday   8 am to 5 pm    [place]: [phone]    [place]: [phone]    [place]:  [phone]  **It is YOUR responsibilty to bring medication bottles and/or updated medication list to EACH APPOINTMENT. This will allow us to better serve you and all your healthcare needs**  Thank you for allowing us to care for you today!   We want to ensure we can follow your treatment plan and we strive to give you the best outcomes and experience possible.   If you ever have a life threatening emergency and call 911 - for an ambulance (EMS)   Our providers can only care for you at:   [facility] or [facility].   Even if you have someone take you or you drive yourself we can only care for you in a [facility] facility. Our providers are not setup at the other healthcare locations!

## 2024-01-05 ENCOUNTER — PROCEDURE VISIT (OUTPATIENT)
Dept: CARDIOLOGY CLINIC | Age: 29
End: 2024-01-05

## 2024-01-05 DIAGNOSIS — R55 SYNCOPE, UNSPECIFIED SYNCOPE TYPE: Primary | ICD-10-CM

## 2024-01-05 DIAGNOSIS — Z45.09 ENCOUNTER FOR LOOP RECORDER CHECK: ICD-10-CM

## 2024-01-05 DIAGNOSIS — Z45.09 ENCOUNTER FOR ELECTRONIC ANALYSIS OF REVEAL EVENT RECORDER: ICD-10-CM

## 2024-01-10 ENCOUNTER — HOSPITAL ENCOUNTER (OUTPATIENT)
Age: 29
Setting detail: SPECIMEN
Discharge: HOME | End: 2024-01-10
Payer: MEDICAID

## 2024-01-10 ENCOUNTER — OFFICE VISIT (OUTPATIENT)
Dept: GASTROENTEROLOGY | Age: 29
End: 2024-01-10
Payer: MEDICAID

## 2024-01-10 VITALS
SYSTOLIC BLOOD PRESSURE: 124 MMHG | WEIGHT: 284 LBS | BODY MASS INDEX: 44.57 KG/M2 | DIASTOLIC BLOOD PRESSURE: 74 MMHG | TEMPERATURE: 97.2 F | HEART RATE: 123 BPM | HEIGHT: 67 IN | OXYGEN SATURATION: 97 %

## 2024-01-10 DIAGNOSIS — Z86.19 HISTORY OF HELICOBACTER PYLORI INFECTION: ICD-10-CM

## 2024-01-10 DIAGNOSIS — K59.04 CHRONIC IDIOPATHIC CONSTIPATION: Primary | ICD-10-CM

## 2024-01-10 PROCEDURE — 87338 HPYLORI STOOL AG IA: CPT

## 2024-01-10 PROCEDURE — 1036F TOBACCO NON-USER: CPT | Performed by: NURSE PRACTITIONER

## 2024-01-10 PROCEDURE — G8417 CALC BMI ABV UP PARAM F/U: HCPCS | Performed by: NURSE PRACTITIONER

## 2024-01-10 PROCEDURE — 99212 OFFICE O/P EST SF 10 MIN: CPT | Performed by: NURSE PRACTITIONER

## 2024-01-10 PROCEDURE — G8484 FLU IMMUNIZE NO ADMIN: HCPCS | Performed by: NURSE PRACTITIONER

## 2024-01-10 PROCEDURE — G8427 DOCREV CUR MEDS BY ELIG CLIN: HCPCS | Performed by: NURSE PRACTITIONER

## 2024-01-10 RX ORDER — PSEUDOEPHEDRINE HCL 30 MG/1
TABLET, FILM COATED ORAL
COMMUNITY
Start: 2024-01-08

## 2024-01-10 NOTE — PROGRESS NOTES
Mood and Affect: Mood normal.         Hospital Outpatient Visit on 01/02/2024   Component Date Value Ref Range Status    CRP High Sensitivity 01/02/2024 10.2 (H)  <5.0 mg/L Final       Assessment and Plan:  Chronic idiopathic constipation that is non-worsening improving some with diet changes.  Miralax, MOM and stool softeners not help.  Linzess 290 mcg no help.  She did not trial Trulance and declined samples.  The patient was encouraged to increase her fruit, fiber and fluids.  Recommend avoidance of constipating foods ie. cheese. The patient was provided with information on constipation.  History of H. Pylori infection that was adequately treated with antibiotics; stool test done on 3-9-2023 was negative for H. pylori infection.  Morbid obesity recommend continue with diet changes and make appointment for  non-surgical bariatric department when able to assist with weight loss.  The patient was encouraged to follow-up in 6 months.    Total time:  19 minutes.

## 2024-01-12 LAB — H PYLORI AG STL QL IA: NEGATIVE

## 2024-02-23 ENCOUNTER — PROCEDURE VISIT (OUTPATIENT)
Dept: CARDIOLOGY CLINIC | Age: 29
End: 2024-02-23

## 2024-02-23 DIAGNOSIS — R55 SYNCOPE, UNSPECIFIED SYNCOPE TYPE: Primary | ICD-10-CM

## 2024-02-23 DIAGNOSIS — Z45.09 ENCOUNTER FOR LOOP RECORDER CHECK: ICD-10-CM

## 2024-02-23 DIAGNOSIS — Z45.09 ENCOUNTER FOR ELECTRONIC ANALYSIS OF REVEAL EVENT RECORDER: ICD-10-CM

## 2024-03-20 ENCOUNTER — OFFICE VISIT (OUTPATIENT)
Dept: FAMILY MEDICINE CLINIC | Age: 29
End: 2024-03-20
Payer: MEDICAID

## 2024-03-20 VITALS
OXYGEN SATURATION: 97 % | DIASTOLIC BLOOD PRESSURE: 82 MMHG | BODY MASS INDEX: 42.56 KG/M2 | HEART RATE: 89 BPM | SYSTOLIC BLOOD PRESSURE: 134 MMHG | WEIGHT: 271.2 LBS | TEMPERATURE: 97.3 F | HEIGHT: 67 IN

## 2024-03-20 DIAGNOSIS — J02.9 SORE THROAT: Primary | ICD-10-CM

## 2024-03-20 LAB — S PYO AG THROAT QL: NORMAL

## 2024-03-20 PROCEDURE — 87880 STREP A ASSAY W/OPTIC: CPT | Performed by: FAMILY MEDICINE

## 2024-03-20 PROCEDURE — 1036F TOBACCO NON-USER: CPT | Performed by: FAMILY MEDICINE

## 2024-03-20 PROCEDURE — G8484 FLU IMMUNIZE NO ADMIN: HCPCS | Performed by: FAMILY MEDICINE

## 2024-03-20 PROCEDURE — 99212 OFFICE O/P EST SF 10 MIN: CPT | Performed by: FAMILY MEDICINE

## 2024-03-20 PROCEDURE — G8427 DOCREV CUR MEDS BY ELIG CLIN: HCPCS | Performed by: FAMILY MEDICINE

## 2024-03-20 PROCEDURE — G8417 CALC BMI ABV UP PARAM F/U: HCPCS | Performed by: FAMILY MEDICINE

## 2024-03-20 ASSESSMENT — ENCOUNTER SYMPTOMS
WHEEZING: 0
VOMITING: 0
NAUSEA: 0
SHORTNESS OF BREATH: 0
SORE THROAT: 1

## 2024-03-20 NOTE — PROGRESS NOTES
3/20/24    Kathryn Cost  1995    DONALD Peralta is a 28 y.o. female who presents today for evaluation of:  Chief Complaint   Patient presents with    Cough    Pharyngitis    Otalgia     Sore throat : 2 d ear pain , sore throat, cough. Missed work yesterday.     Review of Systems   Constitutional:  Negative for fever.   HENT:  Positive for congestion, ear pain and sore throat. Negative for rhinorrhea.    Respiratory:  Negative for shortness of breath and wheezing.    Gastrointestinal:  Negative for nausea and vomiting.         No Known Allergies     OBJECTIVE    /82 (Site: Right Upper Arm, Position: Sitting, Cuff Size: Large Adult)   Pulse 89   Temp 97.3 °F (36.3 °C) (Infrared)   Ht 1.702 m (5' 7.01\")   Wt 123 kg (271 lb 3.2 oz)   SpO2 97%   BMI 42.46 kg/m²     Physical Exam   Constitutional:       General: Not in acute distress.     Appearance: Normal appearance. Not ill-appearing.   HENT:      Head: Normocephalic.      Right Ear: Tympanic membrane, ear canal and external ear normal.      Left Ear: Tympanic membrane, ear canal and external ear normal.      Nose: Nose normal.      Right Sinus: No maxillary sinus tenderness or frontal sinus tenderness.      Left Sinus: No maxillary sinus tenderness or frontal sinus tenderness.      Mouth/Throat:      Mouth: Mucous membranes are moist.      Pharynx: No oropharyngeal exudate, posterior oropharyngeal erythema or uvula swelling.      Tonsils: No tonsillar exudate or tonsillar abscesses.  Mild cervical tenderness anteriorly.n   Eyes:      General: No scleral icterus.  Cardiovascular:      Rate and Rhythm: Normal rate and regular rhythm.      Heart sounds: No murmur heard.   No friction rub. No gallop.    Pulmonary:      Effort: Pulmonary effort is normal. No respiratory distress.      Breath sounds: No wheezing, rhonchi or rales.   Abdominal:      Palpations: Abdomen is soft. There is no mass.      Tenderness: There is no abdominal tenderness.

## 2024-04-02 ENCOUNTER — OFFICE VISIT (OUTPATIENT)
Dept: CARDIOLOGY CLINIC | Age: 29
End: 2024-04-02

## 2024-04-02 VITALS
OXYGEN SATURATION: 96 % | HEIGHT: 67 IN | SYSTOLIC BLOOD PRESSURE: 118 MMHG | DIASTOLIC BLOOD PRESSURE: 86 MMHG | BODY MASS INDEX: 42.38 KG/M2 | WEIGHT: 270 LBS | HEART RATE: 91 BPM

## 2024-04-02 DIAGNOSIS — Z98.890 HISTORY OF LOOP RECORDER: ICD-10-CM

## 2024-04-02 DIAGNOSIS — R55 VASOVAGAL SYNCOPE: Primary | ICD-10-CM

## 2024-04-02 RX ORDER — MIDODRINE HYDROCHLORIDE 2.5 MG/1
2.5 TABLET ORAL 3 TIMES DAILY
Qty: 90 TABLET | Refills: 3 | Status: SHIPPED | OUTPATIENT
Start: 2024-04-02

## 2024-04-02 ASSESSMENT — ENCOUNTER SYMPTOMS
EYE PAIN: 0
SHORTNESS OF BREATH: 0
ABDOMINAL PAIN: 0
NAUSEA: 0
DIARRHEA: 0
ABDOMINAL DISTENTION: 0
BLOOD IN STOOL: 0
CONSTIPATION: 0
SINUS PRESSURE: 0
SINUS PAIN: 0
WHEEZING: 0
CHEST TIGHTNESS: 0
VOMITING: 0
COLOR CHANGE: 0
BACK PAIN: 0

## 2024-04-02 NOTE — PROGRESS NOTES
Electrophysiology Follow up Note      Reason for consultation:  Syncope    Chief complaint: dizziness. Syncope 2-3 weeks ago.     Referring physician:  / Shauna Hardy      Primary care physician: Casey Sauceda MD      History of Present Illness:     This visit 4/2/2024  Patient is here today for follow-up on dizziness and syncope.  Patient reports she passed out 2 weeks ago.  She states that she was lightheaded and dizzy and then passed out.  She states that she will have random episodes of lightheadedness dizziness and not pass out.  She is to follow with neurology at [facility] in June.  She denies chest pain, palpitations, shortness of breath, or edema    Previous visit 1/2/2024  Patient here today for follow up on dizziness and syncope.  Patient reports that she had a syncopal episode on Thanksgiving.  She states she was sitting in the chair when she lost consciousness.  She states she had no warning.  She denies chest pain, palpitations, lightheadedness, dizziness, or edema.  She does report intermittent episodes of dizziness and will press the symptom button at that time.  She states that week she will have dizziness with ambulation and with sitting.  She states there is no particular aggravating factor.    Previous visit 6/27/2023  Patient is here today for 1 month follow up s/p loop recorder implantation. Patient reports that she has had 1 episode of dizziness. She did not pass out. She denies chest pain, palpitations, edema.     Previous visit:    Patient is a 27yr old female with hx of hypothyroidism, Obesity referred by  for syncope.  Patient reports that she passes out irrespective of her position and feels like she is having dizziness, cold and clammy and then she will loses consciousness for some time seconds to minutes sometimes up to half an hour.  Patient had a tilt table test and it was negative.  Pt states off and on chest pains are sharp

## 2024-04-02 NOTE — PATIENT INSTRUCTIONS
**It is YOUR responsibilty to bring medication bottles and/or updated medication list to EACH APPOINTMENT. This will allow us to better serve you and all your healthcare needs**  Thank you for allowing us to care for you today!   We want to ensure we can follow your treatment plan and we strive to give you the best outcomes and experience possible.   If you ever have a life threatening emergency and call 911 - for an ambulance (EMS)   Our providers can only care for you at:   [facility] or [facility].   Even if you have someone take you or you drive yourself we can only care for you in a [facility]. Our providers are not setup at the other healthcare locations!   Please be informed that if you contact our office outside of normal business hours the physician on call cannot help with any scheduling or rescheduling issues, procedure instruction questions or any type of medication issue.    We advise you for any urgent/emergency that you go to the nearest emergency room!    PLEASE CALL OUR OFFICE DURING NORMAL BUSINESS HOURS    Monday - Friday   8 am to 5 pm    [place]: [phone]    [place]: [phone]    [place]:  [phone]  We are committed to providing you the best care possible.    If you receive a survey after visiting one of our offices, please take time to share your experience concerning your physician office visit.  These surveys are confidential and no health information about you is shared.    We are eager to improve for you and we are counting on your feedback to help make that happen.

## 2024-04-06 DIAGNOSIS — E03.9 ACQUIRED HYPOTHYROIDISM: ICD-10-CM

## 2024-04-08 RX ORDER — LEVOTHYROXINE SODIUM 0.12 MG/1
125 TABLET ORAL DAILY
Qty: 90 TABLET | Refills: 1 | OUTPATIENT
Start: 2024-04-08

## 2024-04-08 ASSESSMENT — PATIENT HEALTH QUESTIONNAIRE - PHQ9
SUM OF ALL RESPONSES TO PHQ9 QUESTIONS 1 & 2: 0
SUM OF ALL RESPONSES TO PHQ QUESTIONS 1-9: 0
1. LITTLE INTEREST OR PLEASURE IN DOING THINGS: NOT AT ALL
SUM OF ALL RESPONSES TO PHQ QUESTIONS 1-9: 0
1. LITTLE INTEREST OR PLEASURE IN DOING THINGS: NOT AT ALL
2. FEELING DOWN, DEPRESSED OR HOPELESS: NOT AT ALL
2. FEELING DOWN, DEPRESSED OR HOPELESS: NOT AT ALL
SUM OF ALL RESPONSES TO PHQ QUESTIONS 1-9: 0
SUM OF ALL RESPONSES TO PHQ9 QUESTIONS 1 & 2: 0
SUM OF ALL RESPONSES TO PHQ QUESTIONS 1-9: 0

## 2024-04-09 DIAGNOSIS — R73.03 PREDIABETES: ICD-10-CM

## 2024-04-09 RX ORDER — METFORMIN HYDROCHLORIDE 500 MG/1
TABLET, EXTENDED RELEASE ORAL
Qty: 180 TABLET | Refills: 1 | OUTPATIENT
Start: 2024-04-09

## 2024-04-11 ENCOUNTER — OFFICE VISIT (OUTPATIENT)
Dept: FAMILY MEDICINE CLINIC | Age: 29
End: 2024-04-11
Payer: MEDICAID

## 2024-04-11 VITALS
DIASTOLIC BLOOD PRESSURE: 80 MMHG | HEART RATE: 105 BPM | SYSTOLIC BLOOD PRESSURE: 124 MMHG | BODY MASS INDEX: 42.91 KG/M2 | OXYGEN SATURATION: 99 % | HEIGHT: 66 IN | WEIGHT: 267 LBS

## 2024-04-11 DIAGNOSIS — R73.03 PREDIABETES: ICD-10-CM

## 2024-04-11 DIAGNOSIS — E03.9 ACQUIRED HYPOTHYROIDISM: ICD-10-CM

## 2024-04-11 DIAGNOSIS — Z00.00 ENCOUNTER FOR WELL ADULT EXAM WITHOUT ABNORMAL FINDINGS: Primary | ICD-10-CM

## 2024-04-11 DIAGNOSIS — E66.01 MORBID OBESITY (HCC): Chronic | ICD-10-CM

## 2024-04-11 PROCEDURE — 99395 PREV VISIT EST AGE 18-39: CPT | Performed by: FAMILY MEDICINE

## 2024-04-11 RX ORDER — AMOXICILLIN 500 MG/1
500 TABLET, FILM COATED ORAL
COMMUNITY
Start: 2024-04-10

## 2024-04-11 RX ORDER — METFORMIN HYDROCHLORIDE 500 MG/1
1000 TABLET, EXTENDED RELEASE ORAL
Qty: 180 TABLET | Refills: 1 | Status: SHIPPED | OUTPATIENT
Start: 2024-04-11

## 2024-04-11 RX ORDER — LEVOTHYROXINE SODIUM 0.12 MG/1
125 TABLET ORAL DAILY
Qty: 90 TABLET | Refills: 1 | Status: CANCELLED | OUTPATIENT
Start: 2024-04-11

## 2024-04-11 SDOH — ECONOMIC STABILITY: FOOD INSECURITY: WITHIN THE PAST 12 MONTHS, YOU WORRIED THAT YOUR FOOD WOULD RUN OUT BEFORE YOU GOT MONEY TO BUY MORE.: NEVER TRUE

## 2024-04-11 SDOH — ECONOMIC STABILITY: INCOME INSECURITY: HOW HARD IS IT FOR YOU TO PAY FOR THE VERY BASICS LIKE FOOD, HOUSING, MEDICAL CARE, AND HEATING?: NOT HARD AT ALL

## 2024-04-11 SDOH — ECONOMIC STABILITY: FOOD INSECURITY: WITHIN THE PAST 12 MONTHS, THE FOOD YOU BOUGHT JUST DIDN'T LAST AND YOU DIDN'T HAVE MONEY TO GET MORE.: NEVER TRUE

## 2024-04-11 ASSESSMENT — ENCOUNTER SYMPTOMS
EYE PAIN: 0
COUGH: 0
SHORTNESS OF BREATH: 0
TROUBLE SWALLOWING: 0
NAUSEA: 0
DIARRHEA: 0
ABDOMINAL PAIN: 0
CONSTIPATION: 0
APNEA: 0
VOMITING: 0
BACK PAIN: 0

## 2024-04-11 NOTE — PROGRESS NOTES
4/11/24    Kathryn Cost  1995  28 y.o. female     Adult Annual Preventive Visit   Chief Complaint   Patient presents with    6 Month Follow-Up     Weight     Annual Exam     WAEL         Risk Assessment:  Activity habits: exercises and has been losing weight    Eating habits: Healthy eating . More fruits and vegetables.     Sleep habits: 4-5 hr/night    Social support: Good    Stressors: I don't have any    Review of Systems   Constitutional:  Negative for fatigue and fever.   HENT:  Negative for nosebleeds and trouble swallowing.    Eyes:  Negative for pain and visual disturbance.   Respiratory:  Negative for apnea, cough and shortness of breath.    Cardiovascular:  Negative for chest pain, palpitations and leg swelling.   Gastrointestinal:  Negative for abdominal pain, constipation, diarrhea, nausea and vomiting.   Endocrine: Negative for cold intolerance, heat intolerance and polyuria.   Genitourinary:  Negative for difficulty urinating, hematuria, vaginal bleeding, vaginal discharge and vaginal pain.   Musculoskeletal:  Negative for arthralgias, back pain, gait problem and neck pain.   Skin:  Negative for rash and wound.   Allergic/Immunologic: Negative for environmental allergies, food allergies and immunocompromised state.   Neurological:  Positive for syncope (most recently about a month ago - saw heart doctor and has a neurology appt in June). Negative for seizures, speech difficulty, light-headedness, numbness and headaches.   Hematological:  Negative for adenopathy. Does not bruise/bleed easily.   Psychiatric/Behavioral:  Positive for sleep disturbance. Negative for decreased concentration, dysphoric mood and suicidal ideas. The patient is not nervous/anxious.        No Known Allergies     Current Outpatient Medications   Medication Sig Dispense Refill    amoxicillin (AMOXIL) 500 MG tablet 1 tablet      metFORMIN (GLUCOPHAGE-XR) 500 MG extended release tablet Take 2 tablets by mouth daily (with

## 2024-04-11 NOTE — PATIENT INSTRUCTIONS
weight may be enough to improve your health.  Get family and friends involved to provide support. Talk to them about why you are trying to lose weight, and ask them to help. They can help by participating in exercise and having meals with you, even if they may be eating something different.  Find what works best for you. If you do not have time or do not like to cook, a program that offers meal replacement bars or shakes may be better for you. Or if you like to prepare meals, finding a plan that includes daily menus and recipes may be best.  Ask your doctor about other health professionals who can help you achieve your weight loss goals.  A dietitian can help you make healthy changes in your diet.  An exercise specialist or  can help you develop a safe and effective exercise program.  A counselor or psychiatrist can help you cope with issues such as depression, anxiety, or family problems that can make it hard to focus on weight loss.  Consider joining a support group for people who are trying to lose weight. Your doctor can suggest groups in your area.  Where can you learn more?  Go to https://www.[employer].net/patientEd and enter U357 to learn more about \"Starting a Weight Loss Plan: Care Instructions.\"  Current as of: September 20, 2023               Content Version: 14.0  © 2006-2024 [employer].   Care instructions adapted under license by [employer]. If you have questions about a medical condition or this instruction, always ask your healthcare professional. [employer] disclaims any warranty or liability for your use of this information.           Well Visit, Ages 18 to 65: Care Instructions  Well visits can help you stay healthy. Your doctor has checked your overall health and may have suggested ways to take good care of yourself. Your doctor also may have recommended tests. You can help prevent illness with healthy eating, good sleep, vaccinations, regular

## 2024-04-12 LAB
ALBUMIN SERPL-MCNC: 4.7 G/DL (ref 3.4–5)
ALBUMIN/GLOB SERPL: 1.7 {RATIO} (ref 1.1–2.2)
ALP SERPL-CCNC: 80 U/L (ref 40–129)
ALT SERPL-CCNC: 15 U/L (ref 10–40)
ANION GAP SERPL CALCULATED.3IONS-SCNC: 14 MMOL/L (ref 3–16)
AST SERPL-CCNC: 15 U/L (ref 15–37)
BASOPHILS # BLD: 0 K/UL (ref 0–0.2)
BASOPHILS NFR BLD: 0.3 %
BILIRUB SERPL-MCNC: 0.3 MG/DL (ref 0–1)
BUN SERPL-MCNC: 8 MG/DL (ref 7–20)
CALCIUM SERPL-MCNC: 9.8 MG/DL (ref 8.3–10.6)
CHLORIDE SERPL-SCNC: 102 MMOL/L (ref 99–110)
CHOLEST SERPL-MCNC: 224 MG/DL (ref 0–199)
CO2 SERPL-SCNC: 22 MMOL/L (ref 21–32)
CREAT SERPL-MCNC: 0.7 MG/DL (ref 0.6–1.1)
DEPRECATED RDW RBC AUTO: 13.3 % (ref 12.4–15.4)
EOSINOPHIL # BLD: 0.1 K/UL (ref 0–0.6)
EOSINOPHIL NFR BLD: 1.3 %
EST. AVERAGE GLUCOSE BLD GHB EST-MCNC: 108.3 MG/DL
GFR SERPLBLD CREATININE-BSD FMLA CKD-EPI: >90 ML/MIN/{1.73_M2}
GLUCOSE SERPL-MCNC: 90 MG/DL (ref 70–99)
HBA1C MFR BLD: 5.4 %
HCT VFR BLD AUTO: 40.4 % (ref 36–48)
HDLC SERPL-MCNC: 52 MG/DL (ref 40–60)
HGB BLD-MCNC: 13.7 G/DL (ref 12–16)
LDLC SERPL CALC-MCNC: 149 MG/DL
LYMPHOCYTES # BLD: 1.5 K/UL (ref 1–5.1)
LYMPHOCYTES NFR BLD: 24.9 %
MCH RBC QN AUTO: 31 PG (ref 26–34)
MCHC RBC AUTO-ENTMCNC: 33.9 G/DL (ref 31–36)
MCV RBC AUTO: 91.3 FL (ref 80–100)
MONOCYTES # BLD: 0.4 K/UL (ref 0–1.3)
MONOCYTES NFR BLD: 7 %
NEUTROPHILS # BLD: 3.9 K/UL (ref 1.7–7.7)
NEUTROPHILS NFR BLD: 66.5 %
PLATELET # BLD AUTO: 402 K/UL (ref 135–450)
PMV BLD AUTO: 8.6 FL (ref 5–10.5)
POTASSIUM SERPL-SCNC: 4.6 MMOL/L (ref 3.5–5.1)
PROT SERPL-MCNC: 7.4 G/DL (ref 6.4–8.2)
RBC # BLD AUTO: 4.43 M/UL (ref 4–5.2)
SODIUM SERPL-SCNC: 138 MMOL/L (ref 136–145)
T4 FREE SERPL-MCNC: 1.5 NG/DL (ref 0.9–1.8)
TRIGL SERPL-MCNC: 117 MG/DL (ref 0–150)
TSH SERPL DL<=0.005 MIU/L-ACNC: 2.02 UIU/ML (ref 0.27–4.2)
VLDLC SERPL CALC-MCNC: 23 MG/DL
WBC # BLD AUTO: 5.8 K/UL (ref 4–11)

## 2024-04-12 RX ORDER — LEVOTHYROXINE SODIUM 0.12 MG/1
125 TABLET ORAL DAILY
Qty: 90 TABLET | Refills: 2 | Status: SHIPPED | OUTPATIENT
Start: 2024-04-12

## 2024-05-13 ENCOUNTER — TELEPHONE (OUTPATIENT)
Dept: FAMILY MEDICINE CLINIC | Age: 29
End: 2024-05-13

## 2024-05-13 NOTE — TELEPHONE ENCOUNTER
For a pinched nerve the first thing to do is to allow time for the body to recover.  This can be 2 to 4 weeks or may be longer.  Try to avoid movements that would worsen the area pinching the nerve.    For a pulled muscle initially just take it easy with that that muscle for 3 to 4 days and then start doing gentle movements with that muscle with emphasis on trying to stretch the muscle that was pulled just slightly.  When muscles are stretched slightly it helps the recovering muscle to line up the muscle fibers properly.

## 2024-05-13 NOTE — TELEPHONE ENCOUNTER
Patient called asking what is recommended for a pinched nerve or pulled muscle?    Please advise

## 2024-05-14 ENCOUNTER — OFFICE VISIT (OUTPATIENT)
Dept: CARDIOLOGY CLINIC | Age: 29
End: 2024-05-14
Payer: MEDICAID

## 2024-05-14 VITALS
HEART RATE: 84 BPM | BODY MASS INDEX: 42.72 KG/M2 | HEIGHT: 66 IN | SYSTOLIC BLOOD PRESSURE: 118 MMHG | WEIGHT: 265.8 LBS | DIASTOLIC BLOOD PRESSURE: 78 MMHG

## 2024-05-14 DIAGNOSIS — I95.89 OTHER SPECIFIED HYPOTENSION: ICD-10-CM

## 2024-05-14 DIAGNOSIS — Z98.890 HISTORY OF LOOP RECORDER: ICD-10-CM

## 2024-05-14 DIAGNOSIS — R42 DIZZINESS: ICD-10-CM

## 2024-05-14 DIAGNOSIS — R55 VASOVAGAL SYNCOPE: Primary | ICD-10-CM

## 2024-05-14 PROBLEM — I95.9 ARTERIAL HYPOTENSION: Status: ACTIVE | Noted: 2024-05-14

## 2024-05-14 PROCEDURE — 99214 OFFICE O/P EST MOD 30 MIN: CPT | Performed by: NURSE PRACTITIONER

## 2024-05-14 PROCEDURE — G8417 CALC BMI ABV UP PARAM F/U: HCPCS | Performed by: NURSE PRACTITIONER

## 2024-05-14 PROCEDURE — 1036F TOBACCO NON-USER: CPT | Performed by: NURSE PRACTITIONER

## 2024-05-14 PROCEDURE — 93291 INTERROG DEV EVAL SCRMS IP: CPT | Performed by: NURSE PRACTITIONER

## 2024-05-14 PROCEDURE — G8427 DOCREV CUR MEDS BY ELIG CLIN: HCPCS | Performed by: NURSE PRACTITIONER

## 2024-05-14 RX ORDER — MIDODRINE HYDROCHLORIDE 5 MG/1
5 TABLET ORAL 3 TIMES DAILY
Qty: 90 TABLET | Refills: 1 | Status: SHIPPED | OUTPATIENT
Start: 2024-05-14

## 2024-05-14 ASSESSMENT — ENCOUNTER SYMPTOMS
CONSTIPATION: 0
EYE PAIN: 0
COLOR CHANGE: 0
SINUS PAIN: 0
SINUS PRESSURE: 0
SHORTNESS OF BREATH: 0
NAUSEA: 0
ABDOMINAL PAIN: 0
DIARRHEA: 0
VOMITING: 0
ABDOMINAL DISTENTION: 0
BLOOD IN STOOL: 0
WHEEZING: 0
CHEST TIGHTNESS: 0
COUGH: 0
BACK PAIN: 0

## 2024-05-22 ENCOUNTER — PROCEDURE VISIT (OUTPATIENT)
Dept: CARDIOLOGY CLINIC | Age: 29
End: 2024-05-22

## 2024-05-22 DIAGNOSIS — Z45.09 ENCOUNTER FOR LOOP RECORDER CHECK: ICD-10-CM

## 2024-05-22 DIAGNOSIS — Z45.09 ENCOUNTER FOR ELECTRONIC ANALYSIS OF REVEAL EVENT RECORDER: ICD-10-CM

## 2024-05-22 DIAGNOSIS — R55 SYNCOPE, UNSPECIFIED SYNCOPE TYPE: Primary | ICD-10-CM

## 2024-07-01 NOTE — INTERVAL H&P NOTE
offered but refused. Friend was utilized for    Update History & Physical    The patient's History and Physical of January 9, 2023 was reviewed with the patient and I examined the patient. There was no change. The surgical site was confirmed by the patient and me. Plan: The risks, benefits, expected outcome, and alternative to the recommended procedure have been discussed with the patient. Patient understands and wants to proceed with the procedure.      Electronically signed by Clemente Duong MD on 1/12/2023 at 9:56 AM

## 2024-07-03 ENCOUNTER — PROCEDURE VISIT (OUTPATIENT)
Dept: CARDIOLOGY CLINIC | Age: 29
End: 2024-07-03

## 2024-07-03 DIAGNOSIS — Z45.09 ENCOUNTER FOR ELECTRONIC ANALYSIS OF REVEAL EVENT RECORDER: ICD-10-CM

## 2024-07-03 DIAGNOSIS — R55 SYNCOPE, UNSPECIFIED SYNCOPE TYPE: Primary | ICD-10-CM

## 2024-07-03 DIAGNOSIS — Z45.09 ENCOUNTER FOR LOOP RECORDER CHECK: ICD-10-CM

## 2024-07-26 ENCOUNTER — OFFICE VISIT (OUTPATIENT)
Dept: GASTROENTEROLOGY | Age: 29
End: 2024-07-26

## 2024-07-26 VITALS
TEMPERATURE: 97.5 F | SYSTOLIC BLOOD PRESSURE: 126 MMHG | BODY MASS INDEX: 41.98 KG/M2 | WEIGHT: 261.2 LBS | HEIGHT: 66 IN | OXYGEN SATURATION: 97 % | HEART RATE: 78 BPM | DIASTOLIC BLOOD PRESSURE: 64 MMHG

## 2024-07-26 DIAGNOSIS — Z86.19 HISTORY OF HELICOBACTER PYLORI INFECTION: ICD-10-CM

## 2024-07-26 DIAGNOSIS — K59.09 CHRONIC CONSTIPATION: Primary | ICD-10-CM

## 2024-07-26 RX ORDER — SENNOSIDES 8.6 MG
1 TABLET ORAL 2 TIMES DAILY
Qty: 120 TABLET | Refills: 3 | Status: SHIPPED | OUTPATIENT
Start: 2024-07-26

## 2024-07-26 NOTE — PROGRESS NOTES
Kathryn Read 29 y.o. female was seen by MARCIA Rivera on 7/26/2024     Wt Readings from Last 3 Encounters:   07/26/24 118.5 kg (261 lb 3.2 oz)   05/14/24 120.6 kg (265 lb 12.8 oz)   04/11/24 121.1 kg (267 lb)       DANNY Read is a pleasant 29 y.o.  female who presents today for follow-up on chronic constipation.  She reports constipation improving some with diet changes and weight loss.  Her typical bowel pattern is every two to three days with mix hard stools to soft brown formed stools.  She did not have improvement with taking Linzess, Trulance or Miralax.  She mentioned coffee and fiber helps.  No diarrhea.  No blood in her stools or melena.  No excess belching or flatulence.  Her appetite is good and weight is down with eating healthier and portion control.  She is exercising daily with walking.  Her stress has improved with job change.  She has not been able to start the bariatric program due to work/school obligations.  No abdominal pain, bloating or distention.  No heartburn or acid reflux symptoms. No nocturnal awakenings with acid reflux.  No dysphagia or pain with swallowing.  No nausea or vomiting.  No family history of stomach or colon cancer.              ROS  Review of Systems   Constitutional:  Negative for appetite change, chills, diaphoresis, fatigue, fever and unexpected weight change.   HENT:  Negative for ear pain, hearing loss and tinnitus.    Eyes:  Negative for photophobia, pain and visual disturbance.   Respiratory:  Negative for cough, shortness of breath and wheezing.    Cardiovascular:  Negative for chest pain, palpitations and leg swelling.   Gastrointestinal:  Positive for constipation.  Negative for abdominal pain, blood in stool, diarrhea, nausea and vomiting.   Endocrine: Negative for cold intolerance, heat intolerance and polydipsia.   Genitourinary:  Negative for dysuria, frequency and urgency.   Musculoskeletal:  Negative for back pain, myalgias and neck

## 2024-08-23 ENCOUNTER — PROCEDURE VISIT (OUTPATIENT)
Dept: CARDIOLOGY CLINIC | Age: 29
End: 2024-08-23

## 2024-08-23 DIAGNOSIS — R55 SYNCOPE, UNSPECIFIED SYNCOPE TYPE: Primary | ICD-10-CM

## 2024-08-23 DIAGNOSIS — Z45.09 ENCOUNTER FOR LOOP RECORDER CHECK: ICD-10-CM

## 2024-08-23 DIAGNOSIS — Z45.09 ENCOUNTER FOR ELECTRONIC ANALYSIS OF REVEAL EVENT RECORDER: ICD-10-CM

## 2024-09-30 PROCEDURE — 93298 REM INTERROG DEV EVAL SCRMS: CPT | Performed by: INTERNAL MEDICINE

## 2024-10-11 ENCOUNTER — OFFICE VISIT (OUTPATIENT)
Dept: FAMILY MEDICINE CLINIC | Age: 29
End: 2024-10-11

## 2024-10-11 VITALS
BODY MASS INDEX: 41.76 KG/M2 | WEIGHT: 258.6 LBS | OXYGEN SATURATION: 98 % | HEART RATE: 75 BPM | DIASTOLIC BLOOD PRESSURE: 84 MMHG | SYSTOLIC BLOOD PRESSURE: 128 MMHG

## 2024-10-11 DIAGNOSIS — E03.9 ACQUIRED HYPOTHYROIDISM: ICD-10-CM

## 2024-10-11 DIAGNOSIS — R55 VASOVAGAL SYNCOPE: Primary | Chronic | ICD-10-CM

## 2024-10-11 DIAGNOSIS — R73.03 PREDIABETES: ICD-10-CM

## 2024-10-11 DIAGNOSIS — Z23 IMMUNIZATION DUE: ICD-10-CM

## 2024-10-11 RX ORDER — LEVOTHYROXINE SODIUM 125 UG/1
125 TABLET ORAL DAILY
Qty: 90 TABLET | Refills: 2 | Status: SHIPPED | OUTPATIENT
Start: 2024-10-11

## 2024-10-11 RX ORDER — METFORMIN HCL 500 MG
1000 TABLET, EXTENDED RELEASE 24 HR ORAL
Qty: 180 TABLET | Refills: 2 | Status: SHIPPED | OUTPATIENT
Start: 2024-10-11

## 2024-10-11 NOTE — PROGRESS NOTES
10/11/24    Kathryn Cost  1995    DONALD Peralta is a 29 y.o. female who presents today for evaluation of:  Chief Complaint   Patient presents with    6 Month Follow-Up     Hypothyroid and Syncopal episode.      Flu Vaccine     Syncope : still having passing out episodes but less frequently. Has upcoming neurology appt. Got midodrine rx'd by neurologist and syuncopal episodes are less frequently.   Was told one of her lab tests came back high by her [facility] doctor but does not remember what that was    Thyroid : not more worn out than usual. No heat or cold intolerance.     No Known Allergies     OBJECTIVE    /84 (Site: Left Upper Arm, Position: Sitting, Cuff Size: Medium Adult)   Pulse 75   Wt 117.3 kg (258 lb 9.6 oz)   SpO2 98%   BMI 41.76 kg/m²     Physical Exam   Constitutional:       General: Not in acute distress.     Appearance: Normal appearance. Not ill-appearing.   Eyes:      General: No scleral icterus.  Cardiovascular:      Rate and Rhythm: Normal rate and regular rhythm.      Heart sounds: No murmur heard.   No friction rub. No gallop.    Pulmonary:      Effort: Pulmonary effort is normal. No respiratory distress.      Breath sounds: No wheezing, rhonchi or rales.   Abdominal:      Palpations: Abdomen is soft. There is no mass.      Tenderness: There is no abdominal tenderness.   Musculoskeletal:     Moves all extremities normally.    Skin:     General: Skin is warm.      Coloration: Skin is not jaundiced.   Neurological:      Mental Status: Patient is alert.   Psychiatric:         Behavior: Behavior normal.         Thought Content: Thought content normal.         Judgment: Judgment normal.    Reviewed:  tsh was 0.984 on 6/5/24.  Had multiple labs 6/5/24 and all reviewed only abnormal was platelets was 412.   6/5/2024 A1c was 5.3    ASSESSMENT/PLAN:    1. Vasovagal syncope  Assessment & Plan:   Midodrine as prescribed by specialist seems to be somewhat helpful but she does

## 2024-10-11 NOTE — ASSESSMENT & PLAN NOTE
Recent A1c was 5.3 with her doctor at [facility] in June 2024.  Will continue metformin to prevent development of diabetes.

## 2024-10-11 NOTE — ASSESSMENT & PLAN NOTE
Since the TSH was normal with her [facility] labs on 6/5/2024 I do not think we need to repeat it at this time.  Continue levothyroxine 125 mcg daily.

## 2024-10-11 NOTE — ASSESSMENT & PLAN NOTE
Midodrine as prescribed by specialist seems to be somewhat helpful but she does not have a good explanation for her syncope.  She will be following up with [facility] neurology doctor.  Reviewed labs done at [facility] neurology all of which are except for platelets which was 412 which I do not think is serious and I reassured her of that.

## 2024-10-31 PROCEDURE — 93298 REM INTERROG DEV EVAL SCRMS: CPT | Performed by: INTERNAL MEDICINE

## 2024-11-15 ENCOUNTER — OFFICE VISIT (OUTPATIENT)
Dept: CARDIOLOGY CLINIC | Age: 29
End: 2024-11-15
Payer: MEDICAID

## 2024-11-15 VITALS
BODY MASS INDEX: 39.93 KG/M2 | HEIGHT: 67 IN | HEART RATE: 79 BPM | DIASTOLIC BLOOD PRESSURE: 72 MMHG | WEIGHT: 254.4 LBS | SYSTOLIC BLOOD PRESSURE: 136 MMHG

## 2024-11-15 DIAGNOSIS — R55 VASOVAGAL SYNCOPE: Primary | Chronic | ICD-10-CM

## 2024-11-15 DIAGNOSIS — Z98.890 HISTORY OF LOOP RECORDER: ICD-10-CM

## 2024-11-15 PROCEDURE — 1036F TOBACCO NON-USER: CPT | Performed by: NURSE PRACTITIONER

## 2024-11-15 PROCEDURE — 99214 OFFICE O/P EST MOD 30 MIN: CPT | Performed by: NURSE PRACTITIONER

## 2024-11-15 PROCEDURE — G8417 CALC BMI ABV UP PARAM F/U: HCPCS | Performed by: NURSE PRACTITIONER

## 2024-11-15 PROCEDURE — 93000 ELECTROCARDIOGRAM COMPLETE: CPT | Performed by: NURSE PRACTITIONER

## 2024-11-15 PROCEDURE — G8427 DOCREV CUR MEDS BY ELIG CLIN: HCPCS | Performed by: NURSE PRACTITIONER

## 2024-11-15 PROCEDURE — G8484 FLU IMMUNIZE NO ADMIN: HCPCS | Performed by: NURSE PRACTITIONER

## 2024-11-15 RX ORDER — MIDODRINE HYDROCHLORIDE 10 MG/1
10 TABLET ORAL 3 TIMES DAILY
Qty: 180 TABLET | Refills: 0
Start: 2024-11-15

## 2024-11-15 ASSESSMENT — ENCOUNTER SYMPTOMS
COLOR CHANGE: 0
ABDOMINAL DISTENTION: 0
ABDOMINAL PAIN: 0
DIARRHEA: 0
NAUSEA: 0
CONSTIPATION: 0
CHEST TIGHTNESS: 0
VOMITING: 0
SHORTNESS OF BREATH: 0
COUGH: 0
BACK PAIN: 0
SINUS PAIN: 0
EYE PAIN: 0
BLOOD IN STOOL: 0
SINUS PRESSURE: 0
WHEEZING: 0

## 2024-11-15 NOTE — PATIENT INSTRUCTIONS
Please be informed that if you contact our office outside of normal business hours the physician on call cannot help with any scheduling or rescheduling issues, procedure instruction questions or any type of medication issue.    We advise you for any urgent/emergency that you go to the nearest emergency room!    PLEASE CALL OUR OFFICE DURING NORMAL BUSINESS HOURS    Monday - Friday   8 am to 5 pm    [place]: [phone]    [place]: [phone]    [place]:  [phone]    **It is YOUR responsibilty to bring medication bottles and/or updated medication list to EACH APPOINTMENT. This will allow us to better serve you and all your healthcare needs**    Thank you for allowing us to care for you today!   We want to ensure we can follow your treatment plan and we strive to give you the best outcomes and experience possible.   If you ever have a life threatening emergency and call 911 - for an ambulance (EMS)   Our providers can only care for you at:   [facility] or [facility].   Even if you have someone take you or you drive yourself we can only care for you in a [facility] facility. Our providers are not setup at the other healthcare locations!

## 2024-11-15 NOTE — PROGRESS NOTES
Electrophysiology Follow up Note      Reason for consultation:  Syncope    Chief complaint: Episodes of dizziness, complains of syncopal episode since last office visit lasting 20  minutes while studying     Referring physician:  / Shanua Hardy      Primary care physician: Casey Sauceda MD      History of Present Illness:     This visit 11/15/2024  Patient is here today for follow-up on dizziness and syncope.  Patient reports that she had a syncopal episode while studying.  She states that she was unconscious for 20 minutes.  She denies chest pain, shortness of breath, or edema    Previous visit 5/14/2024  Patient is here for follow-up on dizziness and syncope.  She reports that she had another syncopal episode a few weeks ago.  She states she became dizzy lightheaded and passed out.  She is taking her midodrine as prescribed  She did press the symptom button.  She denies chest pain, shortness of breath palpitations or edema.    Previous visit 4/2/2024  Patient is here today for follow-up on dizziness and syncope.  Patient reports she passed out 2 weeks ago.  She states that she was lightheaded and dizzy and then passed out.  She states that she will have random episodes of lightheadedness dizziness and not pass out.  She is to follow with neurology at [facility] in June.  She denies chest pain, palpitations, shortness of breath, or edema    Previous visit 1/2/2024  Patient here today for follow up on dizziness and syncope.  Patient reports that she had a syncopal episode on Thanksgiving.  She states she was sitting in the chair when she lost consciousness.  She states she had no warning.  She denies chest pain, palpitations, lightheadedness, dizziness, or edema.  She does report intermittent episodes of dizziness and will press the symptom button at that time.  She states that week she will have dizziness with ambulation and with sitting.  She states there is no

## 2025-01-07 ENCOUNTER — TELEPHONE (OUTPATIENT)
Dept: FAMILY MEDICINE CLINIC | Age: 30
End: 2025-01-07

## 2025-01-12 PROCEDURE — 93298 REM INTERROG DEV EVAL SCRMS: CPT | Performed by: INTERNAL MEDICINE

## 2025-01-22 ENCOUNTER — TELEPHONE (OUTPATIENT)
Dept: FAMILY MEDICINE CLINIC | Age: 30
End: 2025-01-22

## 2025-01-22 NOTE — TELEPHONE ENCOUNTER
Patient is pregnant and having morning sickness and would like to get a refill of zofran sent to [facility]

## 2025-01-31 ENCOUNTER — OFFICE VISIT (OUTPATIENT)
Dept: GASTROENTEROLOGY | Age: 30
End: 2025-01-31
Payer: MEDICAID

## 2025-01-31 VITALS
HEART RATE: 101 BPM | WEIGHT: 256.4 LBS | BODY MASS INDEX: 40.24 KG/M2 | HEIGHT: 67 IN | RESPIRATION RATE: 16 BRPM | SYSTOLIC BLOOD PRESSURE: 132 MMHG | DIASTOLIC BLOOD PRESSURE: 74 MMHG | OXYGEN SATURATION: 98 %

## 2025-01-31 DIAGNOSIS — K59.09 CHRONIC CONSTIPATION: Primary | ICD-10-CM

## 2025-01-31 PROCEDURE — 99212 OFFICE O/P EST SF 10 MIN: CPT | Performed by: NURSE PRACTITIONER

## 2025-01-31 PROCEDURE — G8417 CALC BMI ABV UP PARAM F/U: HCPCS | Performed by: NURSE PRACTITIONER

## 2025-01-31 PROCEDURE — 1036F TOBACCO NON-USER: CPT | Performed by: NURSE PRACTITIONER

## 2025-01-31 PROCEDURE — G2211 COMPLEX E/M VISIT ADD ON: HCPCS | Performed by: NURSE PRACTITIONER

## 2025-01-31 PROCEDURE — G8427 DOCREV CUR MEDS BY ELIG CLIN: HCPCS | Performed by: NURSE PRACTITIONER

## 2025-01-31 RX ORDER — SENNOSIDES 8.6 MG
1 TABLET ORAL DAILY
Qty: 30 TABLET | Refills: 3 | Status: SHIPPED | OUTPATIENT
Start: 2025-01-31

## 2025-01-31 NOTE — PROGRESS NOTES
Kathryn Read 29 y.o. female was seen by MARCIA Rivera on 1/31/2025     Wt Readings from Last 3 Encounters:   01/31/25 116.3 kg (256 lb 6.4 oz)   11/15/24 115.4 kg (254 lb 6.4 oz)   10/11/24 117.3 kg (258 lb 9.6 oz)       DANNY Read is a pleasant 29 y.o.  female who presents today for follow-up on chronic constipation.  Her EGD done by Dr. Maradiaga on 1- showed normal esophagus, normal stomach and duodenum.  Her stomach biopsies showed chronic active gastritis with H. Pylori infection negative.  Her typical bowel pattern is every three days with hard pellet like stools.  Senna was helping taking two caused diarrhea.  She did not have improvement with taking Linzess, Trulance or Miralax.  She mentioned coffee and fiber helps.  No diarrhea.  Ongoing constipation.  No blood in her stools or melena.  No excess belching or flatulence.  Her appetite is fair and weight is stable. She is working on weight loss taking in 900 calories per day.  She is cutting back on cheese, and dairy.  No heartburn or acid reflux.  No nocturnal awakenings with acid reflux.  No dysphagia or pain with swallowing.  No abdominal pain, bloating or distention.   No nausea or vomiting.  No family history of stomach or colon cancer.         ROS  Review of Systems   Constitutional:  Negative for appetite change, chills, diaphoresis, fatigue, fever and unexpected weight change.   HENT:  Negative for ear pain, hearing loss and tinnitus.    Eyes:  Negative for photophobia, pain and visual disturbance.   Respiratory:  Negative for cough, shortness of breath and wheezing.    Cardiovascular:  Negative for chest pain, palpitations and leg swelling.   Gastrointestinal:  Positive for constipation.  Negative for abdominal pain, blood in stool, diarrhea, nausea and vomiting.   Endocrine: Negative for cold intolerance, heat intolerance and polydipsia.   Genitourinary:  Negative for dysuria, frequency and urgency.   Musculoskeletal:

## 2025-02-07 ENCOUNTER — TELEPHONE (OUTPATIENT)
Dept: FAMILY MEDICINE CLINIC | Age: 30
End: 2025-02-07

## 2025-02-07 NOTE — TELEPHONE ENCOUNTER
Patient states that she was around someone who was diagnosed with strep throat a few days ago and now she has a sore throat. The patient would like to know what she should do. Please advise.

## 2025-04-01 ENCOUNTER — TELEPHONE (OUTPATIENT)
Age: 30
End: 2025-04-01

## 2025-04-01 NOTE — TELEPHONE ENCOUNTER
KALEB for Patient requesting call back to reschedule office visit with Ca Rutledge NP on 5/12/25, Ca is not in office on Mondays.

## 2025-04-11 SDOH — ECONOMIC STABILITY: TRANSPORTATION INSECURITY
IN THE PAST 12 MONTHS, HAS THE LACK OF TRANSPORTATION KEPT YOU FROM MEDICAL APPOINTMENTS OR FROM GETTING MEDICATIONS?: NO

## 2025-04-11 SDOH — ECONOMIC STABILITY: TRANSPORTATION INSECURITY
IN THE PAST 12 MONTHS, HAS LACK OF TRANSPORTATION KEPT YOU FROM MEETINGS, WORK, OR FROM GETTING THINGS NEEDED FOR DAILY LIVING?: NO

## 2025-04-11 SDOH — ECONOMIC STABILITY: FOOD INSECURITY: WITHIN THE PAST 12 MONTHS, THE FOOD YOU BOUGHT JUST DIDN'T LAST AND YOU DIDN'T HAVE MONEY TO GET MORE.: NEVER TRUE

## 2025-04-11 SDOH — ECONOMIC STABILITY: INCOME INSECURITY: IN THE LAST 12 MONTHS, WAS THERE A TIME WHEN YOU WERE NOT ABLE TO PAY THE MORTGAGE OR RENT ON TIME?: NO

## 2025-04-11 SDOH — ECONOMIC STABILITY: FOOD INSECURITY: WITHIN THE PAST 12 MONTHS, YOU WORRIED THAT YOUR FOOD WOULD RUN OUT BEFORE YOU GOT MONEY TO BUY MORE.: NEVER TRUE

## 2025-04-11 ASSESSMENT — PATIENT HEALTH QUESTIONNAIRE - PHQ9
1. LITTLE INTEREST OR PLEASURE IN DOING THINGS: NOT AT ALL
1. LITTLE INTEREST OR PLEASURE IN DOING THINGS: NOT AT ALL
2. FEELING DOWN, DEPRESSED OR HOPELESS: NOT AT ALL
SUM OF ALL RESPONSES TO PHQ QUESTIONS 1-9: 0
2. FEELING DOWN, DEPRESSED OR HOPELESS: NOT AT ALL
SUM OF ALL RESPONSES TO PHQ9 QUESTIONS 1 & 2: 0
SUM OF ALL RESPONSES TO PHQ QUESTIONS 1-9: 0

## 2025-04-14 ENCOUNTER — OFFICE VISIT (OUTPATIENT)
Dept: FAMILY MEDICINE CLINIC | Age: 30
End: 2025-04-14
Payer: MEDICAID

## 2025-04-14 VITALS
SYSTOLIC BLOOD PRESSURE: 122 MMHG | OXYGEN SATURATION: 99 % | DIASTOLIC BLOOD PRESSURE: 82 MMHG | BODY MASS INDEX: 40.4 KG/M2 | HEART RATE: 96 BPM | WEIGHT: 258 LBS

## 2025-04-14 DIAGNOSIS — R04.0 EPISTAXIS: ICD-10-CM

## 2025-04-14 DIAGNOSIS — E03.9 ACQUIRED HYPOTHYROIDISM: ICD-10-CM

## 2025-04-14 DIAGNOSIS — R73.03 PREDIABETES: ICD-10-CM

## 2025-04-14 DIAGNOSIS — R55 SYNCOPE, UNSPECIFIED SYNCOPE TYPE: ICD-10-CM

## 2025-04-14 DIAGNOSIS — E66.01 MORBID OBESITY: Chronic | ICD-10-CM

## 2025-04-14 DIAGNOSIS — Z00.00 ENCOUNTER FOR WELL ADULT EXAM WITHOUT ABNORMAL FINDINGS: Primary | ICD-10-CM

## 2025-04-14 PROCEDURE — 99395 PREV VISIT EST AGE 18-39: CPT | Performed by: FAMILY MEDICINE

## 2025-04-14 PROCEDURE — 36415 COLL VENOUS BLD VENIPUNCTURE: CPT | Performed by: FAMILY MEDICINE

## 2025-04-14 RX ORDER — METFORMIN HYDROCHLORIDE 500 MG/1
1000 TABLET, EXTENDED RELEASE ORAL
Qty: 180 TABLET | Refills: 3 | Status: SHIPPED | OUTPATIENT
Start: 2025-04-14

## 2025-04-14 RX ORDER — LEVOTHYROXINE SODIUM 125 UG/1
125 TABLET ORAL DAILY
Qty: 90 TABLET | Refills: 2 | Status: CANCELLED | OUTPATIENT
Start: 2025-04-14

## 2025-04-14 ASSESSMENT — ENCOUNTER SYMPTOMS
EYE PAIN: 0
ABDOMINAL PAIN: 1
APNEA: 0
DIARRHEA: 0
BACK PAIN: 0
TROUBLE SWALLOWING: 0
SHORTNESS OF BREATH: 0
CONSTIPATION: 1
NAUSEA: 0
COUGH: 0
VOMITING: 0

## 2025-04-14 NOTE — ASSESSMENT & PLAN NOTE
Her syncope episodes have reduced since starting midodrine.  
 She has gained some weight and I encouraged losing weight.  
 Will check an A1c to monitor for progression to diabetes.  Will continue metformin.  
She has hypothyroidism and I will check a TSH and T4.  She will continue levothyroxine.  Possibly I will adjust the dose.  
no

## 2025-04-14 NOTE — PROGRESS NOTES
4/14/25    Kathryn Cost  1995  29 y.o. female     Adult Annual Preventive Visit   Chief Complaint   Patient presents with    6 Month Follow-Up     Hypothyroidism, pre diabetes        Annual Exam         Risk Assessment:  Activity habits: She gets plenty of physical activity    Eating habits: She eats healthy most of the time but feels that she could cut out all sugary foods    Sleep habits: Lays down 9:30 or 10 and tosses and turns until about 1 a,m. +lots of urination.     Social support: Social connection is good    Stressors: Stress and life is under control    Review of Systems   Constitutional:  Negative for fatigue and fever.   HENT:  Positive for nosebleeds. Negative for trouble swallowing.    Eyes:  Negative for pain and visual disturbance.   Respiratory:  Negative for apnea, cough and shortness of breath.    Cardiovascular:  Positive for chest pain. Negative for leg swelling.   Gastrointestinal:  Positive for abdominal pain and constipation. Negative for diarrhea, nausea and vomiting.   Endocrine: Negative for cold intolerance, heat intolerance and polyuria.   Genitourinary:  Negative for difficulty urinating and hematuria.   Musculoskeletal:  Negative for arthralgias, back pain, gait problem and neck pain.   Skin:  Negative for rash and wound.   Allergic/Immunologic: Negative for environmental allergies, food allergies and immunocompromised state.   Neurological:  Positive for dizziness and light-headedness. Negative for seizures, speech difficulty, numbness and headaches.   Hematological:  Negative for adenopathy. Does not bruise/bleed easily.   Psychiatric/Behavioral:  Negative for decreased concentration, dysphoric mood, sleep disturbance and suicidal ideas. The patient is not nervous/anxious.      Fasting: Yes    No Known Allergies     Current Outpatient Medications   Medication Sig Dispense Refill    metFORMIN (GLUCOPHAGE-XR) 500 MG extended release tablet Take 2 tablets by mouth daily (with

## 2025-04-15 ENCOUNTER — RESULTS FOLLOW-UP (OUTPATIENT)
Dept: FAMILY MEDICINE CLINIC | Age: 30
End: 2025-04-15

## 2025-04-15 DIAGNOSIS — E03.9 ACQUIRED HYPOTHYROIDISM: ICD-10-CM

## 2025-04-15 LAB
ALBUMIN SERPL-MCNC: 4.5 G/DL (ref 3.4–5)
ALBUMIN/GLOB SERPL: 1.9 {RATIO} (ref 1.1–2.2)
ALP SERPL-CCNC: 73 U/L (ref 40–129)
ALT SERPL-CCNC: 19 U/L (ref 10–40)
ANION GAP SERPL CALCULATED.3IONS-SCNC: 13 MMOL/L (ref 3–16)
AST SERPL-CCNC: 19 U/L (ref 15–37)
BASOPHILS # BLD: 0 K/UL (ref 0–0.2)
BASOPHILS NFR BLD: 0.5 %
BILIRUB SERPL-MCNC: 0.3 MG/DL (ref 0–1)
BUN SERPL-MCNC: 11 MG/DL (ref 7–20)
CALCIUM SERPL-MCNC: 9.7 MG/DL (ref 8.3–10.6)
CHLORIDE SERPL-SCNC: 103 MMOL/L (ref 99–110)
CHOLEST SERPL-MCNC: 217 MG/DL (ref 0–199)
CO2 SERPL-SCNC: 21 MMOL/L (ref 21–32)
CREAT SERPL-MCNC: 0.6 MG/DL (ref 0.6–1.1)
DEPRECATED RDW RBC AUTO: 12.3 % (ref 12.4–15.4)
EOSINOPHIL # BLD: 0 K/UL (ref 0–0.6)
EOSINOPHIL NFR BLD: 0.1 %
EST. AVERAGE GLUCOSE BLD GHB EST-MCNC: 102.5 MG/DL
GFR SERPLBLD CREATININE-BSD FMLA CKD-EPI: >90 ML/MIN/{1.73_M2}
GLUCOSE SERPL-MCNC: 88 MG/DL (ref 70–99)
HBA1C MFR BLD: 5.2 %
HCT VFR BLD AUTO: 36.5 % (ref 36–48)
HDLC SERPL-MCNC: 52 MG/DL (ref 40–60)
HGB BLD-MCNC: 12.9 G/DL (ref 12–16)
LDLC SERPL CALC-MCNC: 149 MG/DL
LYMPHOCYTES # BLD: 1.4 K/UL (ref 1–5.1)
LYMPHOCYTES NFR BLD: 26.7 %
MCH RBC QN AUTO: 32.9 PG (ref 26–34)
MCHC RBC AUTO-ENTMCNC: 35.2 G/DL (ref 31–36)
MCV RBC AUTO: 93.4 FL (ref 80–100)
MONOCYTES # BLD: 0.4 K/UL (ref 0–1.3)
MONOCYTES NFR BLD: 7.3 %
NEUTROPHILS # BLD: 3.5 K/UL (ref 1.7–7.7)
NEUTROPHILS NFR BLD: 65.4 %
PLATELET # BLD AUTO: 361 K/UL (ref 135–450)
PMV BLD AUTO: 8.6 FL (ref 5–10.5)
POTASSIUM SERPL-SCNC: 4.8 MMOL/L (ref 3.5–5.1)
PROT SERPL-MCNC: 6.9 G/DL (ref 6.4–8.2)
RBC # BLD AUTO: 3.91 M/UL (ref 4–5.2)
SODIUM SERPL-SCNC: 137 MMOL/L (ref 136–145)
T4 FREE SERPL-MCNC: 1.3 NG/DL (ref 0.9–1.8)
TRIGL SERPL-MCNC: 78 MG/DL (ref 0–150)
TSH SERPL DL<=0.005 MIU/L-ACNC: 2.22 UIU/ML (ref 0.27–4.2)
VLDLC SERPL CALC-MCNC: 16 MG/DL
WBC # BLD AUTO: 5.3 K/UL (ref 4–11)

## 2025-04-15 RX ORDER — LEVOTHYROXINE SODIUM 125 UG/1
125 TABLET ORAL DAILY
Qty: 90 TABLET | Refills: 2 | Status: SHIPPED | OUTPATIENT
Start: 2025-04-15

## 2025-05-12 PROCEDURE — 93298 REM INTERROG DEV EVAL SCRMS: CPT | Performed by: INTERNAL MEDICINE

## 2025-05-20 ENCOUNTER — OFFICE VISIT (OUTPATIENT)
Age: 30
End: 2025-05-20

## 2025-05-20 VITALS
BODY MASS INDEX: 40.49 KG/M2 | DIASTOLIC BLOOD PRESSURE: 62 MMHG | HEIGHT: 67 IN | SYSTOLIC BLOOD PRESSURE: 130 MMHG | WEIGHT: 258 LBS | HEART RATE: 92 BPM

## 2025-05-20 DIAGNOSIS — Z98.890 HISTORY OF LOOP RECORDER: ICD-10-CM

## 2025-05-20 DIAGNOSIS — R55 SYNCOPE, UNSPECIFIED SYNCOPE TYPE: Primary | ICD-10-CM

## 2025-05-20 DIAGNOSIS — I95.89 OTHER SPECIFIED HYPOTENSION: ICD-10-CM

## 2025-05-20 ASSESSMENT — ENCOUNTER SYMPTOMS
CONSTIPATION: 0
NAUSEA: 0
ABDOMINAL PAIN: 0
SINUS PAIN: 0
ABDOMINAL DISTENTION: 0
BLOOD IN STOOL: 0
BACK PAIN: 0
VOMITING: 0
COLOR CHANGE: 0
EYE PAIN: 0
DIARRHEA: 0
COUGH: 0
SHORTNESS OF BREATH: 0
SINUS PRESSURE: 0

## 2025-05-20 NOTE — PROGRESS NOTES
Electrophysiology Follow up Note      Reason for consultation:  Syncope    Chief complaint: Episodes of dizziness  Referring physician:  / Shauna Hardy      Primary care physician: Casey Sauceda MD      History of Present Illness:     This visit 5/20/2025  Patient is here today for follow-up on syncope.  Patient reports that since last office visit she has not had a syncopal episode.  She states that she has had some dizziness.  She endorses that the dizziness is random and denies aggravating or alleviating factors.  Patient has followed up with [facility] for autonomic dysfunction.  Patient denies any other cardiac symptoms.  Patient denies drug or tobacco.  Patient denies caffeine use    Previous visit 11/15/2024  Patient is here today for follow-up on dizziness and syncope.  Patient reports that she had a syncopal episode while studying.  She states that she was unconscious for 20 minutes.  She denies chest pain, shortness of breath, or edema    Previous visit 5/14/2024  Patient is here for follow-up on dizziness and syncope.  She reports that she had another syncopal episode a few weeks ago.  She states she became dizzy lightheaded and passed out.  She is taking her midodrine as prescribed  She did press the symptom button.  She denies chest pain, shortness of breath palpitations or edema.    Previous visit 4/2/2024  Patient is here today for follow-up on dizziness and syncope.  Patient reports she passed out 2 weeks ago.  She states that she was lightheaded and dizzy and then passed out.  She states that she will have random episodes of lightheadedness dizziness and not pass out.  She is to follow with neurology at [facility] in June.  She denies chest pain, palpitations, shortness of breath, or edema    Previous visit 1/2/2024  Patient here today for follow up on dizziness and syncope.  Patient reports that she had a syncopal episode on Thanksgiving.  She states she

## 2025-06-02 NOTE — PROGRESS NOTES
"CT A/P on admit: \"There is hypoattenuation of the pancreatic parenchyma with body/tail junction with findings of acute collection emanating from the main pancreatic duct superiorly to lie within apposition of the lesser curvature of the gastric stomach causing underlying reactive gastritis. \"  RUQ ultrasound ordered due to presence of new transaminitis  GI consulted  "
RHEUMATOLOGY NEW PATIENT VISIT    2023      Patient Name: Adrienne Read  : 1995  Medical Record: [id number]      CHIEF COMPLAINT    Elevated CRP    Pertinent Problems  Obesity, BMI 43.7  GERD  Generalized abdominal pain  Iron deficiency anemia  History of syncope, MRI brain unremarkable 2022, EF 55 to 60% (echo ), normal EKG 2023, Holter monitor, no significant ectopy ()    HISTORY OF PRESENT ILLNESS    Kathryn Read is a 32 y.o. female who was referred by Monique Willis for above concerns. She has been having syncopal episodes x 3 years. She has been evaluated by neurology/ cardiology and findings have been normal.  Patient describes her syncopal events to be random, duration last for minutes to an hour. Not associated with chest pain, palpitations, shortness of breath, not positional, situational or postprandial.  Holter monitor in the past was worn for 24 hours, then 2 weeks. She currently is wearing a Holter monitor, implanted , planned to stay in place for 3 years. She has had no syncopal events while wearing the monitor. Disease progression:   She denies joint pains  Hair loss: Denies  Oral Ulcers: Denies  Dry eyes and mouth: Denies  Rashes: Denies  Photosensitivity: Denies   Photophobia: Denies  Joint Pains: Denies  Raynaud's: Denies  Chest Pain: +  s/p loop recorder implanted on   SOB: Denies  Miscarriages: had one miscarriage, one living child before miscarriage   Blood Clots: Denies  Seizures/strokes: Denies  Kidney Disease: Acute on chronic kidney disease: denies  Anemia/thrombocytopenia/leukopenia: Denies    Current rheum meds: none     Past rheum meds:     No flowsheet data found.       REVIEW OF SYSTEMS     Constitutional:  Denies fever or chills, decreased appetite, or weight loss   Eyes:  Denies change in visual acuity or eye dryness or irritation  HENT:  Denies dry mouth or oral ulcers  Respiratory:  Denies cough or shortness of breath
hip pain/injury

## 2025-07-10 PROCEDURE — 93298 REM INTERROG DEV EVAL SCRMS: CPT | Performed by: INTERNAL MEDICINE

## 2025-07-14 ENCOUNTER — TELEPHONE (OUTPATIENT)
Dept: FAMILY MEDICINE CLINIC | Age: 30
End: 2025-07-14

## 2025-07-14 NOTE — TELEPHONE ENCOUNTER
Recommend making sure tetanus booster is up-to-date and using an antibiotic ointment.  If it starts to show signs of infections then be seen.

## 2025-07-29 ENCOUNTER — OFFICE VISIT (OUTPATIENT)
Dept: GASTROENTEROLOGY | Age: 30
End: 2025-07-29
Payer: MEDICAID

## 2025-07-29 VITALS
OXYGEN SATURATION: 99 % | BODY MASS INDEX: 40.74 KG/M2 | WEIGHT: 259.6 LBS | HEIGHT: 67 IN | RESPIRATION RATE: 19 BRPM | SYSTOLIC BLOOD PRESSURE: 122 MMHG | DIASTOLIC BLOOD PRESSURE: 72 MMHG | HEART RATE: 88 BPM

## 2025-07-29 DIAGNOSIS — K59.09 CHRONIC CONSTIPATION: Primary | ICD-10-CM

## 2025-07-29 PROCEDURE — G8427 DOCREV CUR MEDS BY ELIG CLIN: HCPCS | Performed by: NURSE PRACTITIONER

## 2025-07-29 PROCEDURE — 99212 OFFICE O/P EST SF 10 MIN: CPT | Performed by: NURSE PRACTITIONER

## 2025-07-29 PROCEDURE — 1036F TOBACCO NON-USER: CPT | Performed by: NURSE PRACTITIONER

## 2025-07-29 PROCEDURE — G8417 CALC BMI ABV UP PARAM F/U: HCPCS | Performed by: NURSE PRACTITIONER

## 2025-07-29 PROCEDURE — G2211 COMPLEX E/M VISIT ADD ON: HCPCS | Performed by: NURSE PRACTITIONER

## 2025-07-29 NOTE — PROGRESS NOTES
Kathryn Read 30 y.o. female was seen by MARCIA Rivera on 7/29/2025     Wt Readings from Last 3 Encounters:   07/29/25 117.8 kg (259 lb 9.6 oz)   05/20/25 117 kg (258 lb)   04/14/25 117 kg (258 lb)       DANNY Read is a pleasant 30 y.o.  female who presents today for follow-up on chronic constipation. She reports feeling good.  She denies worsening constipation.  Her bowels are moving three times a week with soft brown formed stools.  Senna helps and takes once daily.  She did not have improvement with taking Linzess, Trulance or Miralax.  She mentioned coffee and fiber helps.  No diarrhea.  No blood in her stools or melena.  No excess belching or flatulence.  Her appetite is good and weight is stable.  She had lactose intolerance.  No nausea or vomiting.  No abdominal pain, bloating or distention.  No heartburn or acid reflux.  No nocturnal awakenings with acid reflux.  No dysphagia or pain with swallowing.  No family history of stomach or colon cancer.            ROS  Review of Systems   Constitutional:  Negative for appetite change, chills, diaphoresis, fatigue, fever and unexpected weight change.   HENT:  Negative for ear pain, hearing loss and tinnitus.    Eyes:  Negative for photophobia, pain and visual disturbance.   Respiratory:  Negative for cough, shortness of breath and wheezing.    Cardiovascular:  Negative for chest pain, palpitations and leg swelling.   Gastrointestinal:  Positive for constipation.  Negative for abdominal pain, blood in stool, diarrhea, nausea and vomiting.   Endocrine: Negative for cold intolerance, heat intolerance and polydipsia.   Genitourinary:  Negative for dysuria, frequency and urgency.   Musculoskeletal:  Negative for back pain, myalgias and neck pain.   Skin:  Negative for color change, pallor and rash.   Allergic/Immunologic: Negative for environmental allergies and food allergies.   Neurological:  Positive for dizziness and headaches. Negative for

## (undated) DEVICE — FORCEPS BX L240CM JAW DIA2.4MM ORNG L CAP W/ NDL DISP RAD

## (undated) DEVICE — Z DISCONTINUED (USE MFG CAT MVABO)  TUBING GAS SAMPLING STD 6.5 FT FEMALE CONN SMRT CAPNOLINE

## (undated) DEVICE — ENDOSCOPY KIT: Brand: MEDLINE INDUSTRIES, INC.